# Patient Record
Sex: FEMALE | Race: WHITE | Employment: UNEMPLOYED | URBAN - METROPOLITAN AREA
[De-identification: names, ages, dates, MRNs, and addresses within clinical notes are randomized per-mention and may not be internally consistent; named-entity substitution may affect disease eponyms.]

---

## 2017-10-13 ENCOUNTER — TELEPHONE (OUTPATIENT)
Dept: FAMILY PLANNING/WOMEN'S HEALTH CLINIC | Age: 42
End: 2017-10-13

## 2017-10-13 NOTE — TELEPHONE ENCOUNTER
A letter was sent to pt but returned. Pt has been contacted twice and voice mail messages have been left but pt has not returned our calls.

## 2020-04-03 ENCOUNTER — HOSPITAL ENCOUNTER (OUTPATIENT)
Dept: CT IMAGING | Age: 45
Discharge: HOME | End: 2020-04-03
Attending: INTERNAL MEDICINE
Payer: COMMERCIAL

## 2020-04-03 DIAGNOSIS — M47.814 THORACIC SPONDYLOSIS: ICD-10-CM

## 2020-04-03 DIAGNOSIS — M47.816 LUMBAR SPONDYLOSIS: ICD-10-CM

## 2020-04-03 DIAGNOSIS — R93.3 ABNORMAL FINDINGS ON DIAGNOSTIC IMAGING OF OTHER PARTS OF DIGESTIVE TRACT: ICD-10-CM

## 2020-04-03 PROCEDURE — 74011636320 HC RX REV CODE- 636/320: Performed by: RADIOLOGY

## 2020-04-03 PROCEDURE — 74177 CT ABD & PELVIS W/CONTRAST: CPT

## 2020-04-03 PROCEDURE — 74011000258 HC RX REV CODE- 258: Performed by: RADIOLOGY

## 2020-04-03 RX ORDER — SODIUM CHLORIDE 0.9 % (FLUSH) 0.9 %
10 SYRINGE (ML) INJECTION
Status: COMPLETED | OUTPATIENT
Start: 2020-04-03 | End: 2020-04-03

## 2020-04-03 RX ADMIN — Medication 10 ML: at 11:03

## 2020-04-03 RX ADMIN — SODIUM CHLORIDE 100 ML: 900 INJECTION, SOLUTION INTRAVENOUS at 11:03

## 2020-04-03 RX ADMIN — IOHEXOL 50 ML: 240 INJECTION, SOLUTION INTRATHECAL; INTRAVASCULAR; INTRAVENOUS; ORAL at 11:03

## 2020-04-03 RX ADMIN — IOPAMIDOL 100 ML: 755 INJECTION, SOLUTION INTRAVENOUS at 11:03

## 2020-05-27 ENCOUNTER — OFFICE VISIT (OUTPATIENT)
Dept: PRIMARY CARE CLINIC | Age: 45
End: 2020-05-27

## 2020-05-27 DIAGNOSIS — Z01.818 PRE-OP EXAM: Primary | ICD-10-CM

## 2020-05-27 RX ORDER — NAPROXEN SODIUM 550 MG/1
550 TABLET ORAL AS NEEDED
COMMUNITY
End: 2020-06-24

## 2020-05-27 RX ORDER — POLYETHYLENE GLYCOL 3350 17 G/17G
17 POWDER, FOR SOLUTION ORAL DAILY
COMMUNITY
End: 2021-10-29 | Stop reason: ALTCHOICE

## 2020-05-27 RX ORDER — PANTOPRAZOLE SODIUM 20 MG/1
20 TABLET, DELAYED RELEASE ORAL DAILY
COMMUNITY
End: 2021-10-29 | Stop reason: ALTCHOICE

## 2020-05-27 NOTE — PATIENT INSTRUCTIONS
A Healthy Lifestyle: Care Instructions  Your Care Instructions    A healthy lifestyle can help you feel good, stay at a healthy weight, and have plenty of energy for both work and play. A healthy lifestyle is something you can share with your whole family. A healthy lifestyle also can lower your risk for serious health problems, such as high blood pressure, heart disease, and diabetes. You can follow a few steps listed below to improve your health and the health of your family. Follow-up care is a key part of your treatment and safety. Be sure to make and go to all appointments, and call your doctor if you are having problems. It's also a good idea to know your test results and keep a list of the medicines you take. How can you care for yourself at home? · Do not eat too much sugar, fat, or fast foods. You can still have dessert and treats now and then. The goal is moderation. · Start small to improve your eating habits. Pay attention to portion sizes, drink less juice and soda pop, and eat more fruits and vegetables. ? Eat a healthy amount of food. A 3-ounce serving of meat, for example, is about the size of a deck of cards. Fill the rest of your plate with vegetables and whole grains. ? Limit the amount of soda and sports drinks you have every day. Drink more water when you are thirsty. ? Eat at least 5 servings of fruits and vegetables every day. It may seem like a lot, but it is not hard to reach this goal. A serving or helping is 1 piece of fruit, 1 cup of vegetables, or 2 cups of leafy, raw vegetables. Have an apple or some carrot sticks as an afternoon snack instead of a candy bar. Try to have fruits and/or vegetables at every meal.  · Make exercise part of your daily routine. You may want to start with simple activities, such as walking, bicycling, or slow swimming. Try to be active 30 to 60 minutes every day. You do not need to do all 30 to 60 minutes all at once.  For example, you can exercise 3 times a day for 10 or 20 minutes. Moderate exercise is safe for most people, but it is always a good idea to talk to your doctor before starting an exercise program.  · Keep moving. Kobe Fenggs the lawn, work in the garden, or [employer]. Take the stairs instead of the elevator at work. · If you smoke, quit. People who smoke have an increased risk for heart attack, stroke, cancer, and other lung illnesses. Quitting is hard, but there are ways to boost your chance of quitting tobacco for good. ? Use nicotine gum, patches, or lozenges. ? Ask your doctor about stop-smoking programs and medicines. ? Keep trying. In addition to reducing your risk of diseases in the future, you will notice some benefits soon after you stop using tobacco. If you have shortness of breath or asthma symptoms, they will likely get better within a few weeks after you quit. · Limit how much alcohol you drink. Moderate amounts of alcohol (up to 2 drinks a day for men, 1 drink a day for women) are okay. But drinking too much can lead to liver problems, high blood pressure, and other health problems. Family health  If you have a family, there are many things you can do together to improve your health. · Eat meals together as a family as often as possible. · Eat healthy foods. This includes fruits, vegetables, lean meats and dairy, and whole grains. · Include your family in your fitness plan. Most people think of activities such as jogging or tennis as the way to fitness, but there are many ways you and your family can be more active. Anything that makes you breathe hard and gets your heart pumping is exercise. Here are some tips:  ? Walk to do errands or to take your child to school or the bus.  ? Go for a family bike ride after dinner instead of watching TV. Where can you learn more? Go to http://yamilet-haylee.info/  Enter O237 in the search box to learn more about \"A Healthy Lifestyle: Care Instructions. \"  Current as of: May 28, 2019Content Version: 12.4  © 9514-8813 [employer]. Care instructions adapted under license by [employer] (which disclaims liability or warranty for this information). If you have questions about a medical condition or this instruction, always ask your healthcare professional. [address] any warranty or liability for your use of this information.

## 2020-05-29 LAB — SARS-COV-2, NAA: NOT DETECTED

## 2020-06-01 ENCOUNTER — ANESTHESIA EVENT (OUTPATIENT)
Dept: ENDOSCOPY | Age: 45
End: 2020-06-01
Payer: COMMERCIAL

## 2020-06-01 ENCOUNTER — HOSPITAL ENCOUNTER (OUTPATIENT)
Age: 45
Setting detail: OUTPATIENT SURGERY
Discharge: HOME | End: 2020-06-01
Attending: INTERNAL MEDICINE | Admitting: INTERNAL MEDICINE
Payer: COMMERCIAL

## 2020-06-01 ENCOUNTER — ANESTHESIA (OUTPATIENT)
Dept: ENDOSCOPY | Age: 45
End: 2020-06-01
Payer: COMMERCIAL

## 2020-06-01 VITALS
TEMPERATURE: 97.7 F | OXYGEN SATURATION: 100 % | WEIGHT: 248.02 LBS | BODY MASS INDEX: 42.34 KG/M2 | SYSTOLIC BLOOD PRESSURE: 100 MMHG | RESPIRATION RATE: 20 BRPM | DIASTOLIC BLOOD PRESSURE: 72 MMHG | HEART RATE: 64 BPM | HEIGHT: 64 IN

## 2020-06-01 LAB — HCG UR QL: NEGATIVE

## 2020-06-01 PROCEDURE — 88305 TISSUE EXAM BY PATHOLOGIST: CPT

## 2020-06-01 PROCEDURE — 74011250636 HC RX REV CODE- 250/636: Performed by: INTERNAL MEDICINE

## 2020-06-01 PROCEDURE — 77030013992 HC SNR POLYP ENDOSC BSC -B: Performed by: INTERNAL MEDICINE

## 2020-06-01 PROCEDURE — 81025 URINE PREGNANCY TEST: CPT

## 2020-06-01 PROCEDURE — 74011250636 HC RX REV CODE- 250/636: Performed by: NURSE ANESTHETIST, CERTIFIED REGISTERED

## 2020-06-01 PROCEDURE — 76060000031 HC ANESTHESIA FIRST 0.5 HR: Performed by: INTERNAL MEDICINE

## 2020-06-01 PROCEDURE — 76040000019: Performed by: INTERNAL MEDICINE

## 2020-06-01 RX ORDER — SODIUM CHLORIDE 9 MG/ML
INJECTION, SOLUTION INTRAVENOUS
Status: DISCONTINUED | OUTPATIENT
Start: 2020-06-01 | End: 2020-06-01 | Stop reason: HOSPADM

## 2020-06-01 RX ORDER — FLUMAZENIL 0.1 MG/ML
0.2 INJECTION INTRAVENOUS
Status: DISCONTINUED | OUTPATIENT
Start: 2020-06-01 | End: 2020-06-01 | Stop reason: HOSPADM

## 2020-06-01 RX ORDER — FENTANYL CITRATE 50 UG/ML
100 INJECTION, SOLUTION INTRAMUSCULAR; INTRAVENOUS
Status: DISCONTINUED | OUTPATIENT
Start: 2020-06-01 | End: 2020-06-01 | Stop reason: HOSPADM

## 2020-06-01 RX ORDER — ATROPINE SULFATE 0.1 MG/ML
0.5 INJECTION INTRAVENOUS
Status: DISCONTINUED | OUTPATIENT
Start: 2020-06-01 | End: 2020-06-01 | Stop reason: HOSPADM

## 2020-06-01 RX ORDER — DEXTROMETHORPHAN/PSEUDOEPHED 2.5-7.5/.8
1.2 DROPS ORAL
Status: DISCONTINUED | OUTPATIENT
Start: 2020-06-01 | End: 2020-06-01 | Stop reason: HOSPADM

## 2020-06-01 RX ORDER — EPINEPHRINE 0.1 MG/ML
1 INJECTION INTRACARDIAC; INTRAVENOUS
Status: DISCONTINUED | OUTPATIENT
Start: 2020-06-01 | End: 2020-06-01 | Stop reason: HOSPADM

## 2020-06-01 RX ORDER — PROPOFOL 10 MG/ML
INJECTION, EMULSION INTRAVENOUS AS NEEDED
Status: DISCONTINUED | OUTPATIENT
Start: 2020-06-01 | End: 2020-06-01 | Stop reason: HOSPADM

## 2020-06-01 RX ORDER — SODIUM CHLORIDE 9 MG/ML
50 INJECTION, SOLUTION INTRAVENOUS CONTINUOUS
Status: DISCONTINUED | OUTPATIENT
Start: 2020-06-01 | End: 2020-06-01 | Stop reason: HOSPADM

## 2020-06-01 RX ORDER — NALOXONE HYDROCHLORIDE 0.4 MG/ML
0.4 INJECTION, SOLUTION INTRAMUSCULAR; INTRAVENOUS; SUBCUTANEOUS
Status: DISCONTINUED | OUTPATIENT
Start: 2020-06-01 | End: 2020-06-01 | Stop reason: HOSPADM

## 2020-06-01 RX ORDER — MIDAZOLAM HYDROCHLORIDE 1 MG/ML
.25-5 INJECTION, SOLUTION INTRAMUSCULAR; INTRAVENOUS
Status: DISCONTINUED | OUTPATIENT
Start: 2020-06-01 | End: 2020-06-01 | Stop reason: HOSPADM

## 2020-06-01 RX ADMIN — PROPOFOL 50 MG: 10 INJECTION, EMULSION INTRAVENOUS at 09:07

## 2020-06-01 RX ADMIN — SODIUM CHLORIDE: 900 INJECTION, SOLUTION INTRAVENOUS at 08:50

## 2020-06-01 RX ADMIN — PROPOFOL 100 MG: 10 INJECTION, EMULSION INTRAVENOUS at 09:00

## 2020-06-01 RX ADMIN — SODIUM CHLORIDE 50 ML/HR: 900 INJECTION, SOLUTION INTRAVENOUS at 07:53

## 2020-06-01 RX ADMIN — PROPOFOL 30 MG: 10 INJECTION, EMULSION INTRAVENOUS at 09:13

## 2020-06-01 RX ADMIN — PROPOFOL 30 MG: 10 INJECTION, EMULSION INTRAVENOUS at 09:16

## 2020-06-01 RX ADMIN — PROPOFOL 30 MG: 10 INJECTION, EMULSION INTRAVENOUS at 09:09

## 2020-06-01 RX ADMIN — PROPOFOL 30 MG: 10 INJECTION, EMULSION INTRAVENOUS at 09:19

## 2020-06-01 RX ADMIN — PROPOFOL 50 MG: 10 INJECTION, EMULSION INTRAVENOUS at 09:03

## 2020-06-01 NOTE — ANESTHESIA POSTPROCEDURE EVALUATION
Procedure(s):  COLONOSCOPY  ESOPHAGOGASTRODUODENOSCOPY (EGD)  ENDOSCOPIC POLYPECTOMY. MAC    Anesthesia Post Evaluation        Patient location during evaluation: bedside  Level of consciousness: awake  Pain management: satisfactory to patient  Airway patency: patent  Anesthetic complications: no  Cardiovascular status: acceptable  Respiratory status: acceptable  Hydration status: acceptable        INITIAL Post-op Vital signs:   Vitals Value Taken Time   /72 6/1/2020  9:46 AM   Temp 36.5 °C (97.7 °F) 6/1/2020  9:34 AM   Pulse 64 6/1/2020  9:50 AM   Resp 16 6/1/2020  9:50 AM   SpO2 100 % 6/1/2020  9:50 AM   Vitals shown include unvalidated device data.

## 2020-06-01 NOTE — PERIOP NOTES
Received Report From Geno Brown CRNA @ 6057, see anesthesia notes. Care of the patient transferred to procedure nurse Mei Marroquin RN @ 1685    Out of Procedure and sent to post-recovery @ 6718    Post-recovery report given to [facility] @ 6683    Patient ABD remains soft and non-tender post procedure. Pt has no complaints at this time and tolerated the procedure well. Endoscope was pre-cleaned at bedside immediately following procedure by Zac Taylor.

## 2020-06-01 NOTE — H&P
Patient Name: Jun Woods   Account #: [id number]    Gender: Female    (age): 1975 (38)       Provider:     Julio C Goodman MD         Chief Complaint: ABNormal CT           History of Present Illness:           Personal history years of pain, worsened by motion torso, physical activity. Spine XR indicate extensive spondylosis. 2018 had severe pains with ER visit where prominent retroperitoneal LN seen. Recent pain flare returned to ER where CT showed LN enlargement. CBC, CMP were unremarkable. No fever, vomiting, visible blood loss. ? Referral to interventional radiology did not feel lymph node biopsies to be either feasible or indicated. Past Medical History      Medical Conditions: Arthritis  Hemorrhoids   Surgical Procedures: Tubal Ligation  Warren teeth removal  Tonsillectomy     Dx Studies: CT Scan   Medications: ibuprofen 200 mg Take 1 capsule by mouth once a day  ondansetron HCl 24 mg Take 1 tablet by mouth as directed   Allergies: Sulfa (Sulfonamide Antibiotics)   Immunizations: Influenza, seasonal, injectable (refused)      Social History      Alcohol: None   Tobacco: Former smoker   Drugs: Former. Exercise: None   Caffeine: Daily. Family History       Review of Systems:   Cardiovascular: Presents suffers from Dez Schein. Denies chest pain, irregular heart beat, palpitations, peripheral edema, syncope. Constitutional: Denies fatigue, fever, loss of appetite, weight gain, weight loss. ENMT: Denies nose bleeds, sore throat, hearing loss. Endocrine: Denies excessive thirst, heat intolerance. Eyes: Denies loss of vision. Gastrointestinal: Presents suffers from abdominal pain, change in bowel habits, constipation, diarrhea, Bloating/gas, nausea. Denies abdominal swelling, heartburn, jaundice, rectal bleeding, stomach cramps, vomiting, dysphagia, rectal pain, Stool incontinence, hematemesis. Genitourinary: Presents suffers from dark urine. Denies dysuria, frequent urination, hematuria, incontinence. Hematologic/Lymphatic: Denies easy bruising, prolonged bleeding. Integumentary: Denies itching, rashes, sun sensitivity. Musculoskeletal: Presents suffers from arthritis, back pain. Denies gout, joint pain, muscle weakness, stiffness. Neurological: Presents suffers from dizziness, frequent headaches. Denies fainting, memory loss. Psychiatric: Presents suffers from difficulty sleeping. Denies anxiety, depression, hallucinations, nervousness, panic attacks, paranoia. Respiratory: Denies cough, dyspnea, wheezing. Vital Signs:   See nursing notes     Physical Exam:   Constitutional:      Appearance: No distress, appears comfortable. Skin:      Inspection: No rash, no jaundice. Head/face: Inspection: Normacephalic, atraumatic. Eyes:      Conjunctivae/lids: Normal.   ENMT:      External: Normal.   Neck:      Neck: Normal appearance, trachea midline. Respiratory:      Effort: Normal respiratory effort, comfortable, speaks in complete sentences. Auscultation: normal breath sounds, no rubs, wheezes or rhonchi. Cardiovascular: Auscultation: normal, S1 and S2, no gallops , no rubs or murmurs . Gastrointestinal/Abdomen:      Abdomen: non-distended, nontender. Liver/Spleen: normal, normal size, Liver size and consistency normal, spleen is non-palpable. Musculoskeletal:      Gait/station: normal.   Back: diffusely tender spine and ribs. Muscles: normal strength and tone, no atrophy or abnormal movements. Psychiatric:      Judgment/insight: Normal, normal judgement, normal insight. Mood and affect: Normal mood, affect full, no evidence of depression, anxiety or agitation. Lymphatic:      Neck: No lymphadenopathy in the cervical or supraclavicular chain. Impressions: Abnormal findings on diagnostic imaging of other parts of digestive tract  Lumbar spondylosis  Thoracic spondylosis?  ?     Plan: I've discussed EGD, colonoscopy possible biopsy, polypectomy, cautery, injection, alternatives, complications including but not limited to pain, cardiopulmonary event, bleeding, perforation requiring additional blood transfusion or operative repair; all questions answered.

## 2020-06-01 NOTE — DISCHARGE INSTRUCTIONS
Ssuie Tomlin  [id number]  1975    DISCHARGE INSTRUCTIONS  Discomfort:  Redness at IV site- apply warm compress to area; if redness or soreness persist- contact your physician. There may be a slight amount of blood passed from the rectum. Gaseous discomfort - walking, belching will help relieve any discomfort. You may not operate a vehicle for 12 hours. You may not engage in an occupation involving machinery or appliances for rest of today. You may not drink alcoholic beverages for at least 12 hours. Avoid making any critical decisions for at least 24 hours. DIET:   High fiber diet. - however -  remember your colon is empty and a heavy meal will produce gas. Avoid these foods:  vegetables, fried / greasy foods, carbonated drinks for today. ACTIVITY:  You may resume your normal daily activities it is recommended that you spend the remainder of the day resting -  avoid any strenuous activity. CALL M.D.   ANY SIGN OF:   Increasing pain, nausea, vomiting  Abdominal distension (swelling)  New increased bleeding (oral or rectal)  Fever (chills)  Pain in chest area  Bloody discharge from nose or mouth  Shortness of breath     Follow-up Instructions:  Call Dr. Leann Chatterjee in five years for follow up colon exam  See Dr Kelly Garza 9-12 months for follow up abdominal CT scan  Continue current medications if you feel better using them; if no improvement discontinue them      DISCHARGE SUMMARY from Nurse    The following personal items collected during your admission are returned to you:   Dental Appliance: Dental Appliances: None  Vision: Visual Aid: None  Hearing Aid:    Jewelry:    Clothing:    Other Valuables:    Valuables sent to safe:

## 2020-06-01 NOTE — ROUTINE PROCESS
Nathalie Jarvis  1975  [id number]    Situation:  Verbal report received from: Nicole  Procedure: Procedure(s):  COLONOSCOPY  ESOPHAGOGASTRODUODENOSCOPY (EGD)  ENDOSCOPIC POLYPECTOMY    Background:    Preoperative diagnosis: PERSISTANT VOMITTING  GENERALIZED ABDOMINAL PAIN  Postoperative diagnosis: sigmoid polyp    :  Dr. Hamilton Martin  Assistant(s): Endoscopy Technician-1: Celso Diaz  Endoscopy RN-1: Jolynn Salvador RN    Specimens:   ID Type Source Tests Collected by Time Destination   1 : sigmoid polyp Preservative Sigmoid  Cathy Mcneal MD 6/1/2020 0919 Pathology     H. Pylori  no    Assessment:  Intra-procedure medications     Anesthesia gave intra-procedure sedation and medications, see anesthesia flow sheet yes    Intravenous fluids: NS@ KVO     Vital signs stable     Abdominal assessment: round and soft     Recommendation:  Discharge patient per MD order.   Family or Friend   Permission to share finding with family or friend yes

## 2020-06-01 NOTE — ANESTHESIA PREPROCEDURE EVALUATION
Relevant Problems   No relevant active problems       Anesthetic History   No history of anesthetic complications            Review of Systems / Medical History  Patient summary reviewed, nursing notes reviewed and pertinent labs reviewed    Pulmonary  Within defined limits                 Neuro/Psych   Within defined limits      Headaches     Cardiovascular  Within defined limits                     GI/Hepatic/Renal  Within defined limits              Endo/Other  Within defined limits           Other Findings              Physical Exam    Airway  Mallampati: II  TM Distance: 4 - 6 cm  Neck ROM: normal range of motion   Mouth opening: Normal     Cardiovascular    Rhythm: regular  Rate: normal         Dental  No notable dental hx       Pulmonary  Breath sounds clear to auscultation               Abdominal         Other Findings            Anesthetic Plan    ASA: 2  Anesthesia type: MAC            Anesthetic plan and risks discussed with: Patient

## 2020-06-01 NOTE — PERIOP NOTES
Patient resting comfortably on stretcher, HOB elevated, VS stable, call bell within reach, waiting for procedure start, will continue to monitor pt.

## 2020-06-01 NOTE — PROCEDURES
Rafael Carlson M.D. 2020    Esophagogastroduodenoscopy (EGD) Colonoscopy Procedure Note  Naz Rousseau  : 1975  [facility] Medical Record Number: [id number]      Indications:    abnormal CT, generalized abdominal pains  Referring Physician:  None  Anesthesia/Sedation: see nursing notes  Endoscopist:  Dr. Yaquelin Melendez  Assistants: None  Permit:  The indications, risks, benefits and alternatives were reviewed with the patient or their decision maker who was provided an opportunity to ask questions and all questions were answered. The specific risks of esophagogastroduodenoscopy with conscious sedation were reviewed, including but not limited to anesthetic complication, bleeding, adverse drug reaction, missed lesion, infection, IV site reactions, and intestinal perforation which would lead to the need for surgical repair. Alternatives to EGD including radiographic imaging, observation without testing, or laboratory testing were reviewed as well as the limitations of those alternatives discussed. After considering the options and having all their questions answered, the patient or their decision maker provided both verbal and written consent to proceed. Procedure in Detail:  After obtaining informed consent, positioning of the patient in the left lateral decubitus position, and conduction of a pre-procedure pause or \"time out\" the endoscope was introduced into the mouth and advanced to the duodenum. A careful inspection was made, and findings or interventions are described below.     Findings:   Esophagus:normal  Stomach: normal   Duodenum/jejunum: normal    Complications/estimated blood loss: none    Therapies:  none    Specimens: none    Implants:none           Endoscopist:  Dr. Yaquelin Melendez  Assistants: None  Complications:  None  Estimate Blood Loss:  None    Colonoscopy is to follow    Permit:  The indications, risks, benefits and alternatives were reviewed with the patient or their decision maker who was provided an opportunity to ask questions and all questions were answered. The specific risks of colonoscopy with conscious sedation were reviewed, including but not limited to anesthetic complication, bleeding, adverse drug reaction, missed lesion, infection, IV site reactions, and intestinal perforation which would lead to the need for surgical repair. Alternatives to colonoscopy including radiographic imaging, observation without testing, or laboratory testing were reviewed including the limitations of those alternatives. After considering the options and having all their questions answered, the patient or their decision maker provided both verbal and written consent to proceed. Procedure in Detail:  After obtaining informed consent, positioning of the patient in the left lateral decubitus position, and conduction of a pre-procedure pause or \"time out\" the endoscope was introduced into the anus and advanced to the terminal ileum. The quality of the colonic preparation was satisfactory. A careful inspection was made as the colonoscope was withdrawn, findings and interventions are described below. Appendiceal orifice photographed    Findings:   Single sessile sigmoid polyp; exam otherwise unremarkable    Specimens:    colon polyp removed cold snare    Implants: none    Complications:   None; patient tolerated the procedure well. Estimated blood loss: none    Impression:  incidental finding colon polyp    Recommendations:     - Repeat colonoscopy in 5 years. - trial of clearlax 17 gm/ 12 ounces daily    Thank you for entrusting me with this patient's care. Please do not hesitate to contact me with any questions or if I can be of assistance with any of your other patients' GI needs.     Signed By: Aj Powell MD                        June 1, 2020

## 2020-06-02 ENCOUNTER — VIRTUAL VISIT (OUTPATIENT)
Dept: FAMILY MEDICINE CLINIC | Age: 45
End: 2020-06-02

## 2020-06-02 VITALS — BODY MASS INDEX: 42.34 KG/M2 | RESPIRATION RATE: 16 BRPM | HEIGHT: 64 IN | WEIGHT: 248 LBS

## 2020-06-02 DIAGNOSIS — K59.00 CONSTIPATION, UNSPECIFIED CONSTIPATION TYPE: ICD-10-CM

## 2020-06-02 DIAGNOSIS — R10.11 RIGHT UPPER QUADRANT ABDOMINAL PAIN: Primary | ICD-10-CM

## 2020-06-02 DIAGNOSIS — R59.0 LYMPHADENOPATHY, ABDOMINAL: ICD-10-CM

## 2020-06-02 NOTE — PROGRESS NOTES
Vaughn Baker is a 38 y.o. female who was seen by synchronous (real-time) audio-video technology on 6/2/2020. Assessment & Plan:   Diagnoses and all orders for this visit:    1. Right upper quadrant abdominal pain  -     CT ABD PELV W WO CONT; Future  -     METABOLIC PANEL, COMPREHENSIVE; Future  -     LIPASE; Future  -     CBC WITH AUTOMATED DIFF; Future    2. Lymphadenopathy, abdominal  -     CT ABD PELV W WO CONT; Future  -     METABOLIC PANEL, COMPREHENSIVE; Future  -     CBC WITH AUTOMATED DIFF; Future    3. Constipation, unspecified constipation type  -     TSH 3RD GENERATION; Future        Uncertain etiology of her abdominal pain  Needs follow up on lymphadenopathy  Constipation may be related to her abdominal pain  CT early July  Labs per orders. She has been off of her Miralax temporarily and will restart it today    Follow-up and Dispositions    · Return in about 2 weeks (around 6/16/2020) for abdominal pain - in person visit. Reviewed plan of care. Patient has provided input and agrees with goals. CPT Codes 63063-45407 for Established Patients may apply to this Telehealth Visit      Subjective:   Vaughn Baker was seen for Establish Care and Abdominal Pain      Patient presents with:  Establish Care  Abdominal Pain    The pain has been present for over a year and is getting worse. The pain is in her RUQ radiating towards her umbilicus. It is sharp and then feels like an electric shock. The pain is daily, lasting around 10 - 20 minutes. Nothing in particular makes it worse, Naprosyn sometimes helps a little. She has seen Gastroenterology about her pain and, except for a colon polyp, had a normal EGD and colonoscopy this month. Her last abdominal/peilvic CT was in March showing intrabdominal adenopathy. Radiology recommended that she either have a PET scan or a follow up CT in 3 months.         Review of Systems   Constitutional: Positive for malaise/fatigue and weight loss. Negative for chills and fever. Respiratory: Negative for shortness of breath. Cardiovascular: Negative for chest pain. Gastrointestinal: Positive for abdominal pain, constipation and heartburn. Negative for blood in stool, diarrhea, melena, nausea and vomiting. GERD symptoms 2-3 times a week         Objective:     Visit Vitals  Resp 16   Ht 5' 4\" (1.626 m)   Wt 248 lb (112.5 kg)   LMP 05/08/2020   BMI 42.57 kg/m²       Physical Exam  Constitutional:       General: She is not in acute distress. Appearance: Normal appearance. She is not diaphoretic. Neurological:      Mental Status: She is alert and oriented to person, place, and time. Due to this being a TeleHealth evaluation, many elements of the physical examination are unable to be assessed. We discussed the expected course, resolution and complications of the diagnosis(es) in detail. Medication risks, benefits, costs, interactions, and alternatives were discussed as indicated. I advised her to contact the office if her condition worsens, changes or fails to improve as anticipated. She expressed understanding with the diagnosis(es) and plan. Pursuant to the emergency declaration under the [facility], [facility] waiver authority and the [employer] General Act, this Virtual  Visit was conducted, with patient's consent, to reduce the patient's risk of exposure to COVID-19 and provide continuity of care for an established patient. Services were provided through a video synchronous discussion virtually to substitute for in-person clinic visit.     Jeffrey Echavarria MD

## 2020-06-02 NOTE — PROGRESS NOTES
Chief Complaint   Patient presents with   24 [facility] Establish Care    Abdominal Pain     1. Have you been to the ER, urgent care clinic since your last visit? Hospitalized since your last visit? No    2. Have you seen or consulted any other health care providers outside of the [address] [place] [address] since your last visit? Include any pap smears or colon screening. Yes, 06/01/2020 01 [facility], Endoscopy.     Patient is completing visit from [place], [place].

## 2020-06-03 ENCOUNTER — HOSPITAL ENCOUNTER (OUTPATIENT)
Dept: LAB | Age: 45
Discharge: HOME | End: 2020-06-03

## 2020-06-03 DIAGNOSIS — K59.00 CONSTIPATION, UNSPECIFIED CONSTIPATION TYPE: ICD-10-CM

## 2020-06-03 DIAGNOSIS — R59.0 LYMPHADENOPATHY, ABDOMINAL: ICD-10-CM

## 2020-06-03 DIAGNOSIS — R10.11 RIGHT UPPER QUADRANT ABDOMINAL PAIN: ICD-10-CM

## 2020-06-03 LAB
ALBUMIN SERPL-MCNC: 3.7 G/DL (ref 3.5–5)
ALBUMIN/GLOB SERPL: 0.9 {RATIO} (ref 1.1–2.2)
ALP SERPL-CCNC: 140 U/L (ref 45–117)
ALT SERPL-CCNC: 26 U/L (ref 12–78)
ANION GAP SERPL CALC-SCNC: 8 MMOL/L (ref 5–15)
AST SERPL-CCNC: 31 U/L (ref 15–37)
BASOPHILS # BLD: 0 K/UL (ref 0–0.1)
BASOPHILS NFR BLD: 0 % (ref 0–1)
BILIRUB SERPL-MCNC: 0.3 MG/DL (ref 0.2–1)
BUN SERPL-MCNC: 10 MG/DL (ref 6–20)
BUN/CREAT SERPL: 12 (ref 12–20)
CALCIUM SERPL-MCNC: 9.8 MG/DL (ref 8.5–10.1)
CHLORIDE SERPL-SCNC: 106 MMOL/L (ref 97–108)
CO2 SERPL-SCNC: 24 MMOL/L (ref 21–32)
CREAT SERPL-MCNC: 0.85 MG/DL (ref 0.55–1.02)
DIFFERENTIAL METHOD BLD: ABNORMAL
EOSINOPHIL # BLD: 0.1 K/UL (ref 0–0.4)
EOSINOPHIL NFR BLD: 2 % (ref 0–7)
ERYTHROCYTE [DISTWIDTH] IN BLOOD BY AUTOMATED COUNT: 15 % (ref 11.5–14.5)
GLOBULIN SER CALC-MCNC: 4.1 G/DL (ref 2–4)
GLUCOSE SERPL-MCNC: 91 MG/DL (ref 65–100)
HCT VFR BLD AUTO: 36.1 % (ref 35–47)
HGB BLD-MCNC: 11.1 G/DL (ref 11.5–16)
IMM GRANULOCYTES # BLD AUTO: 0 K/UL (ref 0–0.04)
IMM GRANULOCYTES NFR BLD AUTO: 0 % (ref 0–0.5)
LIPASE SERPL-CCNC: 93 U/L (ref 73–393)
LYMPHOCYTES # BLD: 1.1 K/UL (ref 0.8–3.5)
LYMPHOCYTES NFR BLD: 21 % (ref 12–49)
MCH RBC QN AUTO: 26.1 PG (ref 26–34)
MCHC RBC AUTO-ENTMCNC: 30.7 G/DL (ref 30–36.5)
MCV RBC AUTO: 84.9 FL (ref 80–99)
MONOCYTES # BLD: 0.3 K/UL (ref 0–1)
MONOCYTES NFR BLD: 6 % (ref 5–13)
NEUTS SEG # BLD: 3.8 K/UL (ref 1.8–8)
NEUTS SEG NFR BLD: 71 % (ref 32–75)
NRBC # BLD: 0 K/UL (ref 0–0.01)
NRBC BLD-RTO: 0 PER 100 WBC
PLATELET # BLD AUTO: 267 K/UL (ref 150–400)
PMV BLD AUTO: 10.5 FL (ref 8.9–12.9)
POTASSIUM SERPL-SCNC: 4.2 MMOL/L (ref 3.5–5.1)
PROT SERPL-MCNC: 7.8 G/DL (ref 6.4–8.2)
RBC # BLD AUTO: 4.25 M/UL (ref 3.8–5.2)
SODIUM SERPL-SCNC: 138 MMOL/L (ref 136–145)
TSH SERPL DL<=0.05 MIU/L-ACNC: 3.15 UIU/ML (ref 0.36–3.74)
WBC # BLD AUTO: 5.3 K/UL (ref 3.6–11)

## 2020-06-10 DIAGNOSIS — R74.8 ELEVATED ALKALINE PHOSPHATASE LEVEL: Primary | ICD-10-CM

## 2020-06-11 ENCOUNTER — HOSPITAL ENCOUNTER (OUTPATIENT)
Dept: LAB | Age: 45
Discharge: HOME | End: 2020-06-11

## 2020-06-11 DIAGNOSIS — R74.8 ELEVATED ALKALINE PHOSPHATASE LEVEL: ICD-10-CM

## 2020-06-11 LAB — GGT SERPL-CCNC: 48 U/L (ref 5–55)

## 2020-06-13 LAB
ALP BONE CFR SERPL: 21 % (ref 14–68)
ALP INTEST CFR SERPL: 0 % (ref 0–18)
ALP LIVER CFR SERPL: 79 % (ref 18–85)
ALP SERPL-CCNC: 142 IU/L (ref 39–117)

## 2020-06-23 PROBLEM — D12.6 TUBULAR ADENOMA OF COLON: Status: ACTIVE | Noted: 2020-06-23

## 2020-06-23 NOTE — PROGRESS NOTES
HISTORY OF PRESENT ILLNESS  Leon Rosa is a 38 y.o. female. Leon Rosa is here to follow up on her RUQ abdominal pain. It is unchanged. and has been present for over a year. The pain is in her RUQ radiating towards her umbilicus and RLQ. It is sharp and then feels like an electric shock. The pain is daily, lasting around 10 - 20 minutes. Nothing in particular makes it worse, Naprosyn sometimes helps a little. Her labs were significant for a mildly decreased hemoglobin, elevated globulin, and an elevated alkaline phos. Isoenzymes were normal.  She had a colonoscopy and EGD done the first of the month and a tubular adenoma was found. Her EGD was normal.   Also, she has intrabdominal adenopathy and has a follow up CT and biopsy scheduled. This is scheduled for next week. Review of Systems   Constitutional: Positive for weight loss. Negative for chills and fever. Gastrointestinal: Positive for abdominal pain. Negative for blood in stool, constipation, diarrhea, heartburn, melena, nausea and vomiting. Neurological: Negative for dizziness. Visit Vitals  BP (!) 128/95   Pulse 83   Temp 97.8 °F (36.6 °C) (Temporal)   Resp 20   Ht 5' 4\" (1.626 m)   Wt 248 lb (112.5 kg)   SpO2 99%   BMI 42.57 kg/m²     BP Readings from Last 3 Encounters:   06/24/20 (!) 128/95   06/01/20 100/72   12/13/16 (!) 144/92       Physical Exam  Vitals signs and nursing note reviewed. Constitutional:       General: She is not in acute distress. Appearance: Normal appearance. She is well-developed. She is not diaphoretic. Cardiovascular:      Rate and Rhythm: Normal rate and regular rhythm. Heart sounds: Normal heart sounds. No murmur. No friction rub. No gallop. Pulmonary:      Effort: Pulmonary effort is normal. No respiratory distress. Breath sounds: Normal breath sounds. No wheezing or rales. Abdominal:      General: Bowel sounds are normal. There is no distension.       Palpations: Abdomen is soft.      Tenderness: There is abdominal tenderness in the right lower quadrant. There is no guarding or rebound. Skin:     General: Skin is warm and dry. Neurological:      Mental Status: She is alert and oriented to person, place, and time. ASSESSMENT and PLAN    ICD-10-CM ICD-9-CM    1. Right upper quadrant abdominal pain R10.11 789.01    2. Elevated blood protein E88.09 273.8 FREE LIGHT CHAINS, KAPPA/LAMBDA, QT      PROTEIN ELECTROPHORESIS      PROTEIN ELECTROPHORESIS + JOHAN, UR, 24HR   3. Lymphadenopathy, abdominal R59.0 785.6 FREE LIGHT CHAINS, KAPPA/LAMBDA, QT      PROTEIN ELECTROPHORESIS      PROTEIN ELECTROPHORESIS + JOHAN, UR, 24HR   4. Constipation, unspecified constipation type K59.00 564.00    5. Obesity, morbid ([address] E66.01 278.01    6. Elevated blood pressure reading R03.0 796.2         Pain unchanged, consider myofascial pain if CT does not reveal anything else that could be causing this  Constipation resolved  Labs per orders. Continue current plans. Follow-up and Dispositions    · Return in about 6 weeks (around 8/5/2020) for blood pressure. Reviewed plan of care. Patient has provided input and agrees with goals.

## 2020-06-24 ENCOUNTER — OFFICE VISIT (OUTPATIENT)
Dept: FAMILY MEDICINE CLINIC | Age: 45
End: 2020-06-24

## 2020-06-24 ENCOUNTER — HOSPITAL ENCOUNTER (OUTPATIENT)
Dept: LAB | Age: 45
Discharge: HOME | End: 2020-06-24

## 2020-06-24 VITALS
HEIGHT: 64 IN | HEART RATE: 83 BPM | RESPIRATION RATE: 20 BRPM | BODY MASS INDEX: 42.34 KG/M2 | TEMPERATURE: 97.8 F | OXYGEN SATURATION: 99 % | WEIGHT: 248 LBS | SYSTOLIC BLOOD PRESSURE: 128 MMHG | DIASTOLIC BLOOD PRESSURE: 95 MMHG

## 2020-06-24 DIAGNOSIS — R59.0 LYMPHADENOPATHY, ABDOMINAL: ICD-10-CM

## 2020-06-24 DIAGNOSIS — R77.9 ELEVATED BLOOD PROTEIN: ICD-10-CM

## 2020-06-24 DIAGNOSIS — E66.01 OBESITY, MORBID (HCC): ICD-10-CM

## 2020-06-24 DIAGNOSIS — K59.00 CONSTIPATION, UNSPECIFIED CONSTIPATION TYPE: ICD-10-CM

## 2020-06-24 DIAGNOSIS — R03.0 ELEVATED BLOOD PRESSURE READING: ICD-10-CM

## 2020-06-24 DIAGNOSIS — R10.11 RIGHT UPPER QUADRANT ABDOMINAL PAIN: Primary | ICD-10-CM

## 2020-06-24 NOTE — PROGRESS NOTES
Chief Complaint   Patient presents with    Results     follow up on lab work     Side Pain     right side- pt has paperwork of her history

## 2020-06-25 LAB
ALBUMIN SERPL ELPH-MCNC: 3.8 G/DL (ref 2.9–4.4)
ALBUMIN/GLOB SERPL: 1 {RATIO} (ref 0.7–1.7)
ALPHA1 GLOB SERPL ELPH-MCNC: 0.2 G/DL (ref 0–0.4)
ALPHA2 GLOB SERPL ELPH-MCNC: 0.7 G/DL (ref 0.4–1)
B-GLOBULIN SERPL ELPH-MCNC: 1.4 G/DL (ref 0.7–1.3)
GAMMA GLOB SERPL ELPH-MCNC: 1.4 G/DL (ref 0.4–1.8)
GLOBULIN SER CALC-MCNC: 3.8 G/DL (ref 2.2–3.9)
KAPPA LC FREE SER-MCNC: 20.6 MG/L (ref 3.3–19.4)
KAPPA LC FREE/LAMBDA FREE SER: 1.43 {RATIO} (ref 0.26–1.65)
LAMBDA LC FREE SERPL-MCNC: 14.4 MG/L (ref 5.7–26.3)
M PROTEIN SERPL ELPH-MCNC: ABNORMAL G/DL
PROT SERPL-MCNC: 7.6 G/DL (ref 6–8.5)

## 2020-06-26 ENCOUNTER — HOSPITAL ENCOUNTER (OUTPATIENT)
Dept: LAB | Age: 45
Discharge: HOME | End: 2020-06-26

## 2020-06-26 DIAGNOSIS — R77.9 ELEVATED BLOOD PROTEIN: ICD-10-CM

## 2020-06-29 LAB
ALBUMIN 24H MFR UR ELPH: 100 %
ALPHA1 GLOB 24H MFR UR ELPH: 0 %
ALPHA2 GLOB 24H MFR UR ELPH: 0 %
B-GLOBULIN MFR UR ELPH: 0 %
COLLECT DURATION TIME UR: 24 HR
GAMMA GLOB 24H MFR UR ELPH: 0 %
INTERPRETATION UR IFE-IMP: NORMAL
M PROTEIN 24H MFR UR ELPH: NORMAL %
NOTE, 149533: NORMAL
PROT 24H UR-MRATE: <108 MG/24 HR (ref 30–150)
PROT UR-MCNC: <4 MG/DL
SPECIMEN VOL ?TM UR: 2700 ML

## 2020-07-09 ENCOUNTER — HOSPITAL ENCOUNTER (OUTPATIENT)
Dept: CT IMAGING | Age: 45
Discharge: HOME | End: 2020-07-09
Attending: FAMILY MEDICINE
Payer: COMMERCIAL

## 2020-07-09 DIAGNOSIS — R59.0 LYMPHADENOPATHY, ABDOMINAL: ICD-10-CM

## 2020-07-09 DIAGNOSIS — R10.11 RIGHT UPPER QUADRANT ABDOMINAL PAIN: ICD-10-CM

## 2020-07-09 PROCEDURE — 74011636320 HC RX REV CODE- 636/320: Performed by: RADIOLOGY

## 2020-07-09 PROCEDURE — 74177 CT ABD & PELVIS W/CONTRAST: CPT

## 2020-07-09 RX ADMIN — IOPAMIDOL 100 ML: 755 INJECTION, SOLUTION INTRAVENOUS at 08:38

## 2020-07-19 ENCOUNTER — TELEPHONE (OUTPATIENT)
Dept: FAMILY MEDICINE CLINIC | Age: 45
End: 2020-07-19

## 2020-07-29 ENCOUNTER — OFFICE VISIT (OUTPATIENT)
Dept: FAMILY MEDICINE CLINIC | Age: 45
End: 2020-07-29

## 2020-07-29 VITALS
RESPIRATION RATE: 20 BRPM | DIASTOLIC BLOOD PRESSURE: 68 MMHG | SYSTOLIC BLOOD PRESSURE: 119 MMHG | HEIGHT: 64 IN | BODY MASS INDEX: 42.51 KG/M2 | TEMPERATURE: 98.2 F | OXYGEN SATURATION: 99 % | HEART RATE: 77 BPM | WEIGHT: 249 LBS

## 2020-07-29 DIAGNOSIS — R10.11 RIGHT UPPER QUADRANT ABDOMINAL PAIN: ICD-10-CM

## 2020-07-29 DIAGNOSIS — M19.91 PRIMARY OSTEOARTHRITIS, UNSPECIFIED SITE: ICD-10-CM

## 2020-07-29 DIAGNOSIS — R59.0 LYMPHADENOPATHY, ABDOMINAL: ICD-10-CM

## 2020-07-29 DIAGNOSIS — N20.0 RENAL STONES: ICD-10-CM

## 2020-07-29 DIAGNOSIS — R03.0 ELEVATED BP WITHOUT DIAGNOSIS OF HYPERTENSION: Primary | ICD-10-CM

## 2020-07-29 NOTE — PROGRESS NOTES
HISTORY OF PRESENT ILLNESS  Meagan Salazar is a 38 y.o. female. Meagan Salazar is here to follow up on their elevated BP. This is a chronic problem. The problem occurs constantly and is improved. The symptoms are relieved by nothing. She also needs to follow up on her RUQ pain that has been present for over a year. Abdominal pelvic CT showed:    1. Stable mildly enlarged upper abdominal and retroperitoneal lymph nodes, which  remain indeterminant. No new or enlarging lymphadenopathy in the abdomen or  pelvis. 2. No evidence of acute process in the abdomen or pelvis. 3. Small nonobstructing left nephrolithiasis. Labs were unremarkable. She thinks she may have passed several stones. She wants to see Rheumatology for her osteoarthritis. Review of Systems   Constitutional: Negative for weight loss. No weight gain   Eyes: Negative for blurred vision. Respiratory: Negative for shortness of breath. Cardiovascular: Negative for chest pain and leg swelling. Gastrointestinal: Positive for abdominal pain. Genitourinary: Positive for flank pain. Negative for hematuria. Neurological: Positive for headaches. Negative for dizziness, sensory change, speech change and focal weakness. Occasional mild headaches       Visit Vitals  /68   Pulse 77   Temp 98.2 °F (36.8 °C) (Temporal)   Resp 20   Ht 5' 4\" (1.626 m)   Wt 249 lb (112.9 kg)   SpO2 99%   BMI 42.74 kg/m²     BP Readings from Last 3 Encounters:   06/24/20 (!) 128/95   06/01/20 100/72   12/13/16 (!) 144/92       Physical Exam  Vitals signs and nursing note reviewed. Constitutional:       General: She is not in acute distress. Appearance: She is well-developed. She is not diaphoretic. Cardiovascular:      Rate and Rhythm: Normal rate and regular rhythm. Heart sounds: Normal heart sounds. No murmur. No friction rub. No gallop. Pulmonary:      Effort: Pulmonary effort is normal. No respiratory distress. Breath sounds: Normal breath sounds. No wheezing or rales. Skin:     General: Skin is warm and dry. Neurological:      Mental Status: She is alert and oriented to person, place, and time. ASSESSMENT and PLAN    ICD-10-CM ICD-9-CM    1. Elevated BP without diagnosis of hypertension  R03.0 796.2    2. Renal stones  N20.0 592.0 REFERRAL TO UROLOGY   3. Lymphadenopathy, abdominal  R59.0 785.6 CT ABD PELV W WO CONT   4. Primary osteoarthritis, unspecified site  M19.91 715.10 REFERRAL TO RHEUMATOLOGY   5. Right upper quadrant abdominal pain  R10.11 789.01 REFERRAL TO PHYSICAL THERAPY        Blood pressure now normal  Renal stone, she has a history of chronic renal stones  Chronic abdominal pain likely musculoskeletal  Urology referral  Repeat CT in 6 months  Rheumatology referral  PT referral    Follow-up and Dispositions    · Return if symptoms worsen or fail to improve. Reviewed plan of care. Patient has provided input and agrees with goals.

## 2020-07-31 ENCOUNTER — OFFICE VISIT (OUTPATIENT)
Dept: RHEUMATOLOGY | Age: 45
End: 2020-07-31

## 2020-07-31 VITALS
RESPIRATION RATE: 18 BRPM | HEIGHT: 64 IN | TEMPERATURE: 97.7 F | BODY MASS INDEX: 42.17 KG/M2 | SYSTOLIC BLOOD PRESSURE: 117 MMHG | DIASTOLIC BLOOD PRESSURE: 80 MMHG | WEIGHT: 247 LBS | HEART RATE: 74 BPM

## 2020-07-31 DIAGNOSIS — M06.4 UNDIFFERENTIATED INFLAMMATORY ARTHRITIS (HCC): Primary | ICD-10-CM

## 2020-07-31 DIAGNOSIS — M22.2X1 PATELLOFEMORAL ARTHRALGIA OF BOTH KNEES: ICD-10-CM

## 2020-07-31 DIAGNOSIS — G89.29 CHRONIC BACK PAIN GREATER THAN 3 MONTHS DURATION: ICD-10-CM

## 2020-07-31 DIAGNOSIS — M79.7 FIBROMYALGIA: ICD-10-CM

## 2020-07-31 DIAGNOSIS — M22.2X2 PATELLOFEMORAL ARTHRALGIA OF BOTH KNEES: ICD-10-CM

## 2020-07-31 DIAGNOSIS — M54.9 CHRONIC BACK PAIN GREATER THAN 3 MONTHS DURATION: ICD-10-CM

## 2020-07-31 RX ORDER — PREDNISONE 5 MG/1
TABLET ORAL
Qty: 91 TAB | Refills: 0 | Status: SHIPPED | OUTPATIENT
Start: 2020-07-31 | End: 2020-09-14

## 2020-07-31 NOTE — PATIENT INSTRUCTIONS
I will start you on a short course of prednisone, called a prednisone taper, with 5 mg tablets. This regimen is to be taken as follows:  
 
 - 4 tablets (20 mg), all at once, daily for 7 days - 3 tablets (15 mg), all at once, daily for 7 days - 2 tablets (10 mg), all at once, daily for 14 days - 1 tablet (5 mg), daily for 14 days - then STOP Message on [employer] me when you start 3 tablets a day and inform me how you are feeling. I referred you to physical therapy for your back and knee pain. Patellofemoral Pain Syndrome: Care Instructions Your Care Instructions Patellofemoral pain syndrome is pain in the front of the knee. It is caused by overuse, weak thigh muscles (quadriceps), or a problem with the way the kneecap moves. Extra weight may also cause this syndrome. The patella is the kneecap, and the femur is the thighbone. Some people may have pain in the front of the knee from a condition called chondromalacia. In this problem, the underside of the knee cartilage wears down and frays. Cartilage is a rubbery tissue that cushions joints. In some cases, the kneecap does not move, or track, in a normal way. You may have knee pain when you run, walk down hills or steps, or do another activity. Sitting for a long time also can cause knee pain. Your knee pain may get better with medicines for pain and swelling and exercises to make your quadriceps stronger. Losing weight, if you need to, may also help with pain. Follow-up care is a key part of your treatment and safety. Be sure to make and go to all appointments, and call your doctor if you are having problems. It's also a good idea to know your test results and keep a list of the medicines you take. How can you care for yourself at home?  
· Ask your doctor if you can take an over-the-counter pain medicine, such as acetaminophen (Tylenol), ibuprofen (Advil, Motrin), or naproxen (Aleve). Be safe with medicines. Read and follow all instructions on the label. · Rest and protect your knee. Take a break from activities that cause pain, such as long periods of sitting or kneeling. · Put ice or a cold pack on your knee for 10 to 20 minutes after activity. Put a thin cloth between the ice and your skin. · If your doctor recommends an elastic bandage, sleeve, or other type of support for your knee, wear it as directed. · If your knee is not swollen, you can put moist heat, a heating pad, or a warm cloth on your knee. After several days of rest, you can begin gentle exercise of your knee. · Reach and stay at a healthy weight. Being overweight puts stress on your knees. · Wear athletic shoes that offer good support, especially if you run. · Use shoe inserts, or orthotics, if they help reduce your knee pain. Many drugstores and shoe stores sell them. · See a physical therapist to learn more exercises and stretches to make your legs stronger. When should you call for help? Watch closely for changes in your health, and be sure to contact your doctor if: 
· Your knee pain does not get better or gets worse. Where can you learn more? Go to http://yamilet-haylee.info/ Enter Z257 in the search box to learn more about \"Patellofemoral Pain Syndrome: Care Instructions. \" Current as of: March 2, 2020               Content Version: 12.5 © 3216-4313 [employer]. Care instructions adapted under license by [employer] (which disclaims liability or warranty for this information). If you have questions about a medical condition or this instruction, always ask your healthcare professional. [address] any warranty or liability for your use of this information. FIBROMYALGIA Fibromyalgia is a disease characterized by chronic widespread musculoskeletal pain.  Fibromyalgia is caused by abnormal processing of pain signals in the central nervous system, leading to exaggerated pain responses. There are functional MRI studies that have shown neuroplasticity (re-wiring) of the pain pathways in the brain. Physical and Mental Exercise The mainstay of therapy includes non-pharmacologic therapies such as cardiovascular exercise and Cognitive Behavioral Therapy which have been shown to be of benefit ([address] [facility], Am J Med 2009). Christiano Chi in particular has proven efficacy in the treatment of fibromyalgia Gina Null al. [facility] J Med 2010; 005:[id number]). Performing at least 60 minutes per day of Leretha Dessert has been shown to improve pain without medical management. Medications After discussing with your primary care and if medications are pursued, pregabalin (Lyrica), gabapentin (Neurontin), milnacipran ([place] Keel), and duloxetine (Cymbalta) are FDA approved medications for the treatment of fibromyalgia. Narcotic Pain Medications Are BAD Narcotics, such as oxycodone, hydrocodone, morphine, dilaudid, or codeine, have not been proven to be efficacious in the treatment of fibromyalgia. In fact, narcotic use in this patient population has been observed to exacerbate depression, and may enhance the hyperalgesia which is characteristic of this condition (Romelia Richard Rheum 2006). They also are at increased risk for opioid-induced hyperalgesia due predominantly to central sensitization Pamella Donohue al. [facility] Clin Rheumatol. 2013 Mar;19(2):72-7). Specifically, a double-blind placebo-controlled trial by Sofia Sewell al published in 1995 demonstrated that intravenous morphine did not reduce pain in fibromyalgia patients. A study by Marcela Gamboa al published in 2003 showed that fibromyalgia patients taking oral opiates did not experience improvement in their pain at four years of follow up, and also reported increased depression over the last two years of the study.  There is subsequent concern that the prolonged use of narcotics to treat fibromyalgia may cause harm to these patients Kamaljit Morillo, Pain 2005). Finally, opioid use in fibromyalgia had poorer symptoms and functional and occupational status compared to nonusers (Abhishek REYES et al. Pain Res Treat. 4434;[id number]). Therefore, I recommend that narcotics be avoided in all patients with fibromyalgia. My Recommendations I recommend Christiano Chi stretching exercises for at least 30 minutes per day. The [employer] has made a videotape of Christiano Chi that you can borrow from [employer], purchase online or watch for free on [email or web address]. com Christiano Chi for Arthritis. I strongly recommend you to join a gym that has an indoor pool, to do aqua therapy and Christiano Chi classes. Resources include: [employer], [employer], [employer], and the [facility]. We discussed treating secondary causes, such as sleep apnea, poor sleep quality, depression, anxiety, vitamin deficiencies, such as vitamin D, and pursuing aquatherapy.  I encourage you to do [address].

## 2020-07-31 NOTE — PROGRESS NOTES
REASON FOR VISIT    This is the initial evaluation for Ms. Arpita Broussard a 38 y.o.  female for question of an inflammatory arthritis. The patient is referred to the [facility] at the request of Dr. Joni George. HISTORY OF PRESENT ILLNESS      I have reviewed and summarized old records from [employer] and [address] [place] [address]    In 2010, she developed low back pain, knees, hands, ankles, outer hips, and shoulders. She was told her in youth had she had no cartilage in her knees \"bone on bone. \"    In 6/01/2011, MRI Lumbar Spine without contrast showed Lumbar spine alignment is within normal limits. Chronic Schmorl's node formation in the superior endplates of L1 and L3 and in the inferior  endplate of L5. No compression deformity. Mild multilevel disc desiccation. Conus medullaris terminates at T12-L1. Signal and caliber of the distal cord and conus are within normal limits. Paraspinal soft tissues are within normal limits. T12-L1: facet arthrosis. No herniation or stenosis. L1-L2: Facet arthrosis. No herniation or stenosis. L2-L3: Diffuse disc bulge, facet arthrosis, and thickened ligamentum flavum. Mild central spinal canal stenosis. L3-L4: Diffuse disc bulge, facet arthrosis, and thickened ligamentum flavum. No stenosis. L4-L5: Diffuse disc bulge, facet arthrosis, and thickened ligamentum flavum. No stenosis. L5-S1: Diffuse disc bulge, facet arthrosis, and thickened ligamentum flavum. Mild left foraminal stenosis. In 6/16/2013, Bilateral knee radiograph showed overall alignment is normal. The joint spaces are preserved and only minimal arthritic changes are present, with tiny osteophytes present at the margins of the femorotibial compartments bilaterally. There is no evidence of joint effusion, erosions, or other features to suggest inflammatory arthritis. Bilateral hand radiograph showed Alignment and bone mineralization is normal bilaterally. No significant arthritic changes are present.     In 5/12/2014, MRI Lumbar Spine without contrast showed Alignment: There are 5 nonrib-bearing lumbar type vertebra. There is a normal lumbar lordosis without listhesis. Intervertebral discs: There is L2 and L5 intervertebral disc desiccation with mild height loss at L2 and moderate height loss at the L5 level with mild bulging at L2 and more moderate bulging, anterior extrusion, and dorsal disc protrusion of the L5 disc level. Vertebral bodies/ marrow: There is endplate Modic type II signal demonstrated at the T12, L2 and L5 levels. A small Schmorl's node is seen at the anterior, superior L1 vertebral level. There is no marrow signal to indicate a marrow replacement process or fracture. Conus and cauda equina: The conus terminates at the T12 level and demonstrates normal signal characteristics. The nerve roots of the cauda equina are normal in appearance. Paravertebral soft tissues: There is no paraspinal mass or fluid collection identified. By level: L1-L2: No canal or neural foraminal stenosis. L2-L3: Mild concentric disc bulge canal or neural foraminal stenosis. L3-L4: There is mild facet osteoarthritis without canal or neural foraminal stenosis. L4-L5: There is a left foraminal disc protrusion without canal or neural foraminal stenosis. Mild facet osteoarthritis is also noted. L5-S1: There is a broad-based left paracentral disc protrusion which is not associated with canal or neural foraminal stenosis. In 6/11/2014, labs showed negative INDIGO IFA, rheumatoid factor, ESR 15 mm/hr, CRP 1.8 mg/dL    In 6/03/2020, labs showed WBC 5.3, lymphocytes 1.1, Hct 36.1%, platelets 234,070, creatinine 0.85 mg/dL, eGFR >60, albumin 3.7 g/dL,  U/L, ALT 26 U/L, AST 31 U/L, TSH 3.15    In 6/25/2020, SPEP and UPEP/JOHAN were normal.     Today, she complains of chronic joint pain since her 20's.  Her has chronic hand pain since 2010 which she describes as aching, throbbing, dull, stabbing that is intermittent and flares on its own and resolves on its own. It may be associated with use, such as cooking. It is not daily, but some days it is more intense. At times, she has swelling in her hands at different times of the day that is not often but cannot take her rings off during these episodes. She has morning stiffness lasting 20 to 30 minutes. NSAIDs have not helped much. She has not received steroids. In the morning, she has tingling and numbness in her hands. She has not seen a neurologist. She also complains of intermittent aching pain in her elbows and has a hard time straightening them or bending. These episodes would last 30 to 60 minutes and resolves on its own. It can happen at rest or with activity. She denies elbow pain in the morning. She also complains of shoulder pain, which she attributes to how she sleeps, since she sleeps on them. She notices that is worse with lifting heavy objects or certain movement. She has not done physical therapy for her shoulders. She chronic aching throbbing knee pain that is worse without any known factors. She relieving factors. She has not done physical therapy. She has not received steroid injections. She has not seen orthopedics. Therapy History includes:  Current DMARD therapy includes: none  Prior DMARD therapy includes: none  The following DMARDs have been ineffective: none  The following DMARDs were stopped because of side effects: none    REVIEW OF SYSTEMS    A 15 point review of systems was performed and summarized below. The questionnaire was reviewed with the patient and scanned into the patient's medical record.     General: denies recent weight gain, recent weight loss, fatigue, weakness, fever, drenching night sweats  Musculoskeletal: endorses joint pain, morning stiffness (lasting 30 minutes), muscle pain, denies joint swelling  Ears: denies ringing in ears, hearing loss, deafness  Eyes: denies pain, light sensitive, redness, blindness, double vision, blurred vision, excess tearing, dryness, foreign body sensation  Mouth: denies sore tongue, oral ulcers, loss of taste, dryness, increased dental caries  Nose: denies nosebleeds, nasal ulcers  Throat: denies food stuck when swallowing, difficulty with swallowing, hoarseness, pain in jaw while chewing  Neck: denies swollen glands, tender glands  Cardiopulmonary: denies pain in chest with deep breaths, pain in chest when lying down, murmurs, sudden changes in heart beat, wheezing, dry cough, productive cough, shortness of breath at rest, shortness of breath on exertion, coughing of blood  Gastrointestinal: denies nausea, heartburn, stomach pain relieved by food, chronic constipation, chronic diarrhea, blood in stools, black stools  Genitourinary: denies vaginal dryness, pain or burning on urination, blood in urine, cloudy urine, vaginal ulcers   Hematologic: denies anemia, bleeding tendency, blood clots, bleeding gums  Skin: denies easy bruising, hair loss, rash, rash worsened after sun exposure, hives/urticaria, skin thickening, skin tightness, nodules/bumps, color changes of hands or feet in the cold (Raynaud's)  Neurologic: denies numbness or tingling in hands, numbness or tingling in feet, muscle weakness  Psychiatric: denies depression, excessive worries, PTSD, Bipolar  Sleep: endorses poor sleep (5-8 hours), snoring, difficulty falling asleep, difficulty staying asleep , denies apnea, daytime somnolence    PAST MEDICAL HISTORY    She has a past medical history of DDD (degenerative disc disease), lumbosacral, Migraine, Osteoarthritis, Renal stones, Sciatica, and Tubular adenoma of colon (6/23/2020). FAMILY HISTORY    Her family history includes Arthritis-rheumatoid in her father; Gout in her brother and mother; No Known Problems in her sister, son, and another family member. SOCIAL HISTORY    She reports that she quit smoking about 5 years ago. She smoked 0.50 packs per day.  She has never used smokeless tobacco. She reports previous alcohol use of about 3.3 standard drinks of alcohol per week. She reports that she does not use drugs. GYNECOLOGIC HISTORY     2, Para 2, Living 2, Miscarriage 0    She denies severe pre-eclampsia, eclampsia or placental insufficiency    HEALTH MAINTENANCE    Immunizations  Immunization History   Administered Date(s) Administered    Tdap 2016       Age Appropriate Cancer Screening    Colonoscopy: 2020  Mammogram:     MEDICATIONS    Current Outpatient Medications   Medication Sig Dispense Refill    predniSONE (DELTASONE) 5 mg tablet 4 tabs daily for 7 days, 3 tabs for 7 days, 2 tabs for 14 days, 1 tab for 14 days 91 Tab 0    pantoprazole (PROTONIX) 20 mg tablet Take 20 mg by mouth daily.  polyethylene glycol (Miralax) 17 gram/dose powder Take 17 g by mouth daily. ALLERGIES    Allergies   Allergen Reactions    Sulfa (Sulfonamide Antibiotics) Rash       PHYSICAL EXAMINATION    Visit Vitals  /80   Pulse 74   Temp 97.7 °F (36.5 °C)   Resp 18   Ht 5' 4\" (1.626 m)   Wt 247 lb (112 kg)   BMI 42.40 kg/m²     Body mass index is 42.4 kg/m². General: Patient is alert, oriented x 3, not in acute distress    HEENT:   Conjunctiva are not injected and appear moist, there is no alopecia, neck is supple    Cardiovascular:  Heart is regular rate and rhythm, no murmurs. Chest:  Lungs are clear to auscultation bilaterally. Extremities:  Free of clubbing, cyanosis, edema, extremities well perfused. Neurological exam:  Muscle strength is full in upper and lower extremities. Skin exam:  There are no rashes, no tophi, no psoriasis, no active Raynaud's, no livedo reticularis, no periungual erythema. Musculoskeletal exam:  A comprehensive musculoskeletal exam was performed for all joints of each upper and lower extremity and assessed for swelling, tenderness and range of motion.  Pertinent results are documented as below:     tender points  No interphalangeal tenderness  Bilateral patellar laxity with tenderness    Z-Deformities:   no  [place] Neck Deformities:  no  Boutonierre's Deformities:  no  Ulnar Deviation:   no  MCP Subluxation:  no    Joint Count 7/31/2020   Patient pain (0-100) 60   MHAQ 0.875   Left wrist- Tender 1   Left wrist- Swollen 0   Left 1st MCP - Tender 1   Left 1st MCP - Swollen 0   Left 2nd PIP - Tender 1   Left 2nd PIP - Swollen 1   Left 3rd PIP - Tender 1   Left 3rd PIP - Swollen 1   Left 4th PIP - Tender 1   Left 4th PIP - Swollen 0   Left 5th PIP - Tender 1   Left 5th PIP - Swollen 0   Right wrist- Tender 1   Right wrist- Swollen 1   Right 1st MCP - Tender 1   Right 1st MCP - Swollen 1   Right 3rd MCP - Tender 1   Right 3rd MCP - Swollen 1   Right 4th MCP - Tender 1   Right 4th MCP - Swollen 0   Right 5th MCP - Tender 1   Right 5th MCP - Swollen 0   Right 2nd PIP - Tender 1   Right 2nd PIP - Swollen 1   Right 3rd PIP - Tender 1   Right 3rd PIP - Swollen 1   Right 4th PIP - Tender 1   Right 4th PIP - Swollen 1   Tender Joint Count (Total) 14   Swollen Joint Count (Total) 8   Physician Assessment (0-10) 3   Patient Assessment (0-10) 8   CDAI Total (calculated) 33       DATA REVIEW    Prior medical records were reviewed and are summarized as below:    Laboratory data: summarized in the HPI    Imaging: summarized in the HPI. ASSESSMENT AND PLAN    1) Undifferentiated Inflammatory Arthritis. Her history is not consistent with an inflammatory process, however, her examination revealed tender and swollen joints, suggestive for synovitis. Her CDAI was 33 with 14 tender and 8 swollen joints, consistent with high disease activity. I will start her on a short course of prednisone, called a prednisone taper, with 5 mg tablets. This regimen is to be taken as follows: 4 tabs (20 mg) for 7 days, 3 tabs (15 mg) for 7 days, 2 tabs (10 mg) for 14 days and then 1 tab (5 mg) for 14 days, and then stop.     I asked her to message me before she starts 15 mg weekly on MyChart to determine response. I ordered labs. 2) Fibromyalgia. her history, constellation of symptoms, and examination are consistent with a pain syndrome. I suspect this may be secondary to #1, if she feels better on prednisone. Fibromyalgia is a disease characterized by chronic widespread musculoskeletal pain. Fibromyalgia is caused by abnormal processing of pain signals in the central nervous system, leading to exaggerated pain responses. Non-pharmacologic therapies such as cardiovascular exercise and Cognitive Behavioral Therapy have been shown to be of benefit ([facility], Am J Med 2009). Christiano Chi in particular has proven efficacy in the treatment of fibromyalgia Ok OLIVIA Trujillo 2010). If pharmacotherapy is pursued, pregabalin (Lyrica), gabapentin (Neurontin), milnacipran (Nelda Rodrigez), and duloxetine (Cymbalta) are FDA approved medications for the treatment of fibromyalgia. Narcotics have not been proven to be efficacious in the treatment of fibromyalgia. In fact, narcotic use in this patient population has been observed to exacerbate depression, and may enhance the hyperalgesia which is characteristic of this condition (Olegario Heriberto Rheum 2006). They also are at increased risk for opioid-induced hyperalgesia due predominantly to central sensitization Kelly Donohue al. [facility] Clin Rheumatol. 2013 Mar;19(2):72-7). Specifically, a double-blind placebo-controlled trial by Mark Corrigan al published in 1995 demonstrated that intravenous morphine did not reduce pain in fibromyalgia patients. A study by Pham Stiles al published in 2003 showed that fibromyalgia patients taking oral opiates did not experience improvement in their pain at four years of follow up, and also reported increased depression over the last two years of the study. There is subsequent concern that the prolonged use of narcotics to treat fibromyalgia may cause harm to these patients Helio Sheppard, Pain 2005).  Finally, opioid use in fibromyalgia had poorer symptoms and functional and occupational status compared to nonusers (Abhishek REYES et al. Pain Res Treat. 9672;7793:827394). We therefore recommend that narcotics be avoided in all patients with fibromyalgia. We recommend Christiano Chi stretching exercises for at least 30 minutes per day. The [employer] has made a videotape of Christiano Chi that She can borrow from [employer], purchase online or watch for free on [email or web address]. [email or web address] Christiano Chi for Arthritis. We discussed treating secondary causes, such as sleep apnea, poor sleep quality, depression, anxiety, weight loss, vitamin deficiencies, such as vitamin D, and pursuing aquatherapy. I encouraged her to do [address]. My recommendations were provided to her and she was informed to follow up with her primary care physician for further management of her fibromyalgia. 3) Patellofemoral Arthralgia. This is a benign knee condition due to mistracking of the patella that may cause pain in the knees with prolonged sitting, or going up or down stairs. This tends to be present in hypermobile knees and may become worse if obesity is present. Treatment is to strengthen the quadriceps muscles and weight loss. I referred the patient to physical therapy. 4) Chronic Back Pain. I referred the patient to physical therapy. The patient voiced understanding of the aforementioned assessment and plan. Summary of plan was provided in the After Visit Summary patient instructions. I also provided education about MyChart setup and utility.     TODAY'S ORDERS    Orders Placed This Encounter    QUANTIFERON-TB GOLD PLUS    CYCLIC CITRUL PEPTIDE AB, IGG    CBC WITH AUTOMATED DIFF    CHRONIC HEPATITIS PANEL    METABOLIC PANEL, COMPREHENSIVE    C REACTIVE PROTEIN, QT    SED RATE (ESR)    HLA-B27    RHEUMATOID FACTOR, QL    PROTEIN ELECTROPHORESIS W/ REFLX JOHAN    URIC ACID    VITAMIN D, 25 HYDROXY    REFERRAL TO PHYSICAL THERAPY    REFERRAL TO PHYSICAL THERAPY    predniSONE (DELTASONE) 5 mg tablet     Future Appointments   Date Time Provider Department Center   7/31/2020 12:00 PM Neal Manuel MD [facility]   1/19/2021 10:00 AM [facility] CT 1 SFCT . Yanely Be MD, [address] [place] [address]    Adult Rheumatology   Rheumatology Ultrasound Certified  [facility]  A Part of [facility]  [id number] [place] [address], [address] [place] [address] Road   Phone [phone]  Fax [phone]

## 2020-07-31 NOTE — LETTER
7/31/20 Patient: Leonora Carr YOB: 1975 Date of Visit: 7/31/2020 Patrick Castro MD 
[address] [place] [address] [address] [place] [address] [phone] VIA In Basket Dear Patrick Castro MD, Thank you for referring Ms. Kamaljit Sands to [address] [place] [address] for evaluation. My notes for this consultation are attached. If you have questions, please do not hesitate to call me. I look forward to following your patient along with you.  
 
 
Sincerely, 
 
Veronica Freitas MD

## 2020-08-06 LAB
25(OH)D3+25(OH)D2 SERPL-MCNC: 15.4 NG/ML (ref 30–100)
ALBUMIN SERPL ELPH-MCNC: 3.6 G/DL (ref 2.9–4.4)
ALBUMIN SERPL-MCNC: 4.2 G/DL (ref 3.8–4.8)
ALBUMIN/GLOB SERPL: 1 {RATIO} (ref 0.7–1.7)
ALBUMIN/GLOB SERPL: 1.4 {RATIO} (ref 1.2–2.2)
ALP SERPL-CCNC: 145 IU/L (ref 39–117)
ALPHA1 GLOB SERPL ELPH-MCNC: 0.2 G/DL (ref 0–0.4)
ALPHA2 GLOB SERPL ELPH-MCNC: 0.7 G/DL (ref 0.4–1)
ALT SERPL-CCNC: 16 IU/L (ref 0–32)
AST SERPL-CCNC: 27 IU/L (ref 0–40)
B-GLOBULIN SERPL ELPH-MCNC: 1.3 G/DL (ref 0.7–1.3)
BASOPHILS # BLD AUTO: 0 X10E3/UL (ref 0–0.2)
BASOPHILS NFR BLD AUTO: 0 %
BILIRUB SERPL-MCNC: 0.4 MG/DL (ref 0–1.2)
BUN SERPL-MCNC: 9 MG/DL (ref 6–24)
BUN/CREAT SERPL: 12 (ref 9–23)
CALCIUM SERPL-MCNC: 9.5 MG/DL (ref 8.7–10.2)
CCP IGA+IGG SERPL IA-ACNC: 4 UNITS (ref 0–19)
CHLORIDE SERPL-SCNC: 101 MMOL/L (ref 96–106)
CO2 SERPL-SCNC: 25 MMOL/L (ref 20–29)
COMMENT, 144067: NORMAL
CREAT SERPL-MCNC: 0.77 MG/DL (ref 0.57–1)
CRP SERPL-MCNC: 33 MG/L (ref 0–10)
EOSINOPHIL # BLD AUTO: 0.1 X10E3/UL (ref 0–0.4)
EOSINOPHIL NFR BLD AUTO: 3 %
ERYTHROCYTE [DISTWIDTH] IN BLOOD BY AUTOMATED COUNT: 14.9 % (ref 11.7–15.4)
ERYTHROCYTE [SEDIMENTATION RATE] IN BLOOD BY WESTERGREN METHOD: 68 MM/HR (ref 0–32)
GAMMA GLOB SERPL ELPH-MCNC: 1.4 G/DL (ref 0.4–1.8)
GAMMA INTERFERON BACKGROUND BLD IA-ACNC: 0.06 IU/ML
GLOBULIN SER CALC-MCNC: 3.1 G/DL (ref 1.5–4.5)
GLOBULIN SER CALC-MCNC: 3.7 G/DL (ref 2.2–3.9)
GLUCOSE SERPL-MCNC: 82 MG/DL (ref 65–99)
HBV CORE AB SERPL QL IA: NEGATIVE
HBV CORE IGM SERPL QL IA: NEGATIVE
HBV E AB SERPL QL IA: NEGATIVE
HBV E AG SERPL QL IA: NEGATIVE
HBV SURFACE AB SER QL: NON REACTIVE
HBV SURFACE AG SERPL QL IA: NEGATIVE
HCT VFR BLD AUTO: 35 % (ref 34–46.6)
HCV AB S/CO SERPL IA: 0.1 S/CO RATIO (ref 0–0.9)
HGB BLD-MCNC: 10.9 G/DL (ref 11.1–15.9)
HLA-B27 QL NAA+PROBE: NEGATIVE
IMM GRANULOCYTES # BLD AUTO: 0 X10E3/UL (ref 0–0.1)
IMM GRANULOCYTES NFR BLD AUTO: 0 %
LYMPHOCYTES # BLD AUTO: 1 X10E3/UL (ref 0.7–3.1)
LYMPHOCYTES NFR BLD AUTO: 20 %
M PROTEIN SERPL ELPH-MCNC: NORMAL G/DL
M TB IFN-G BLD-IMP: NEGATIVE
M TB IFN-G CD4+ BCKGRND COR BLD-ACNC: 0.07 IU/ML
MCH RBC QN AUTO: 25.2 PG (ref 26.6–33)
MCHC RBC AUTO-ENTMCNC: 31.1 G/DL (ref 31.5–35.7)
MCV RBC AUTO: 81 FL (ref 79–97)
MITOGEN IGNF BLD-ACNC: 1.7 IU/ML
MONOCYTES # BLD AUTO: 0.3 X10E3/UL (ref 0.1–0.9)
MONOCYTES NFR BLD AUTO: 6 %
NEUTROPHILS # BLD AUTO: 3.4 X10E3/UL (ref 1.4–7)
NEUTROPHILS NFR BLD AUTO: 71 %
PLATELET # BLD AUTO: 261 X10E3/UL (ref 150–450)
PLEASE NOTE, 011150: NORMAL
POTASSIUM SERPL-SCNC: 4.7 MMOL/L (ref 3.5–5.2)
PROT PATTERN SERPL ELPH-IMP: NORMAL
PROT SERPL-MCNC: 7.3 G/DL (ref 6–8.5)
QUANTIFERON INCUBATION, QF1T: NORMAL
QUANTIFERON TB2 AG: 0.06 IU/ML
RBC # BLD AUTO: 4.33 X10E6/UL (ref 3.77–5.28)
RHEUMATOID FACT SERPL-ACNC: 10.5 IU/ML (ref 0–13.9)
SERVICE CMNT-IMP: NORMAL
SODIUM SERPL-SCNC: 142 MMOL/L (ref 134–144)
URATE SERPL-MCNC: 6.1 MG/DL (ref 2.5–7.1)
WBC # BLD AUTO: 4.8 X10E3/UL (ref 3.4–10.8)

## 2020-08-07 NOTE — PROGRESS NOTES
The results were reviewed. Elevated inflammatory markers (ESR 68 mm/hr, CRP 33 mg/L, ). All remaining labs are normal/negative.

## 2020-08-08 ENCOUNTER — PATIENT MESSAGE (OUTPATIENT)
Dept: RHEUMATOLOGY | Age: 45
End: 2020-08-08

## 2020-08-08 DIAGNOSIS — M06.4 UNDIFFERENTIATED INFLAMMATORY ARTHRITIS (HCC): Primary | ICD-10-CM

## 2020-08-10 RX ORDER — METHOTREXATE 2.5 MG/1
15 TABLET ORAL
Qty: 72 TAB | Refills: 0 | Status: SHIPPED | OUTPATIENT
Start: 2020-08-11 | End: 2020-10-27 | Stop reason: SDUPTHER

## 2020-08-10 RX ORDER — FOLIC ACID 1 MG/1
1 TABLET ORAL DAILY
Qty: 90 TAB | Refills: 0 | Status: SHIPPED | OUTPATIENT
Start: 2020-08-10 | End: 2020-10-27 | Stop reason: SDUPTHER

## 2020-08-10 NOTE — TELEPHONE ENCOUNTER
From: Sapphire Duran  Sent: 8/10/2020 6:57 AM EDT  To: Yon Godoy MD  Subject: RE: Non-Urgent Medical Question    It is helping with my hands and wrists. Just was a rough night with pain last night for a few hours.

## 2020-08-26 ENCOUNTER — HOSPITAL ENCOUNTER (OUTPATIENT)
Dept: PHYSICAL THERAPY | Age: 45
Discharge: HOME | End: 2020-08-26
Payer: COMMERCIAL

## 2020-08-26 PROCEDURE — 97162 PT EVAL MOD COMPLEX 30 MIN: CPT | Performed by: PHYSICAL THERAPIST

## 2020-08-26 PROCEDURE — 97163 PT EVAL HIGH COMPLEX 45 MIN: CPT

## 2020-08-26 NOTE — PROGRESS NOTES
PT INITIAL EVALUATION NOTE 2-15    Patient Name: Jared Castillo  Date:2020  : 1975  [x]  Patient  Verified  Payor: Bette Laz[address] / Plan: [address] / Product Type: Managed Care Medicaid /    In time:8:10 AM  Out time: 9:00 AM  Total Treatment Time (min): 50  Visit #: 1    Treatment Area: Low back pain [M54.5]  Bilateral knee pain [M25.561, M25.562]    SUBJECTIVE  Pain Level (0-10 scale): 4/10  Any medication changes, allergies to medications, adverse drug reactions, diagnosis change, or new procedure performed?: [] No    [x] Yes (see summary sheet for update)  Subjective:     Pt c/o low back pain and HOANG knee pain that has been present since . Pt reports that sitting down and resting alleviates her pain whereas any activity makes it worse. Pt reports that she has trouble getting up if she lays down and that her pain limits her ability to perform most functional activities.    PLOF: Pt reports that she used to work numerous jobs that required her to be active  Mechanism of Injury: Insidious onset  Previous Treatment/Compliance: Pt has sought physical therapy for one of her knees in the past after experiencing a fall at work; has gotten medications from doctor  PMHx/Surgical Hx: fibromyalgia, arthritis; reports patellofemoral pain syndrome and degenerative disc disease  Work Hx: currently unemployed; reports that she used to work at [employer] and as a [employer]   Living Situation: lives in single story home with family   Pt Goals: decrease pain, improve functioning  Barriers: chronicity, severity  Motivation: highly motivated  Substance use: N/A  FABQ Score: NT  Cognition: A & O x 4          OBJECTIVE/EXAMINATION    LUMBAR    Posture: excessive pronation in weightbearing   Other Observations:  Gait and Functional Mobility: Pt walks with decreased stride length, no arm swing on the L, a cane in her R hand  Palpation: TTP in the lumbar spine  Joint Mobility: unable to assess due to p! Lumbar AROM:        R  L  Flexion    19\" - p! Extension   Unable to perform due to p! Side Bending   22.5\" - p!  24\" - p! Rotation   16\" - p!  14.5\" - p! Aberrant movements:  Painful arc: [x] Yes  [] No  Instability catch: [] Yes  [x] No  Difficulty returning from flexion: [x] Yes  [] No  Reversal of lumbopelvic rhythm: [] Yes  [x] No      LOWER QUARTER   MUSCLE STRENGTH  KEY       R  L  0 - No Contraction  L1, L2 Psoas  4-/5  4-/5    1 - Trace   L3 Quads  4/5  4/5    2 - Poor   L4 Tib Ant  4/5  4/5    3 - Fair    L5 EHL  4/5  4/5  4 - Good   S1 FHL  4/5  4/5  5 - Normal   S2 Hams  4-/5  4-/5          MMT:  Core strength: NT   Hip abduction: 4-/5   Hip extension: 3/5  Neurological: Sensation: decreased sensation in R L4 dermatome   Special Tests:        Slump: (+) HOANG for knee and back p! Jamshid: NT        Olga: (-) HOANG    SLR: (+) at 20 deg HOANG for back p! BUD: NT due to p! Long Sit: NT    SI compression/ Distraction: NT   Active SLR: NT         KNEE    AROM, PROM:        R  L  Hip IR  Hip ER  Knee Flexion  WNL  WNL       Knee Extension WNL  WNL       Ankle DF  WNL  WNL      Special Tests: Kelsy's: NT due to p! Apley's: NT       Jamshid: NT        Olga: (-) HOANG   H.S. SLR: NT        Long Sit: NT         BUD: NT due to p! Single Leg Stance: unable to perform             Other Objective/Functional Measures: Pt unable to perform the majority of functional measures due to pain; experienced pain during all transitions     Pain Level (0-10 scale) post treatment: 4/10      ASSESSMENT:   Pt presents with chronic LBP and bilateral knee pain. Pt has decreased LE strength, balance deficits, and is generally deconditioned. Pt would benefit from skilled PT services to improve her strength, balance, functional mobility, and pain.    [x]  See Plan of [facility], [facility] 8/26/2020

## 2020-08-26 NOTE — PROGRESS NOTES
[address] [place]  Phone: [phone]  Fax: [phone]    Plan of Care/ Statement of Necessity for Physical Therapy Services 2-15    Patient name: Stu Romero  : 1975  Provider#: [id number]  Referral source: Burt Solano MD      Medical/Treatment Diagnosis: Low back pain [M54.5]  Bilateral knee pain [M25.561, M25.562]     Prior Hospitalization: see medical history     Comorbidities: arthritis, fibromyalgia; pt reports patellofemoral pain syndrome and degenerative disc disease  Prior Level of Function: used to work numerous jobs that required her to be active and on her feet for extended periods of time  Medications: Verified on Patient Summary List    Start of Care: 2020     Onset Date:        The Plan of Care and following information is based on the information from the initial evaluation. Assessment/ key information: Pt presents with chronic LBP and HOANG knee pain that is worsened by any type of activity. Exam revealed decreased LE strength, impaired balance, and decreased tolerance for functional activities due to pain. Pt would benefit from skilled PT services to address the aforementioned impairments. Evaluation Complexity History HIGH Complexity :3+ comorbidities / personal factors will impact the outcome/ POC ; Examination HIGH Complexity : 4+ Standardized tests and measures addressing body structure, function, activity limitation and / or participation in recreation  ;Presentation HIGH Complexity : Unstable and unpredictable characteristics  ; Clinical Decision Making MEDIUM Complexity : FOTO score of 26-74  Overall Complexity Rating: HIGH     Problem List: pain affecting function, decrease ROM, decrease strength, impaired gait/ balance, decrease ADL/ functional abilitiies, decrease activity tolerance, decrease flexibility/ joint mobility and decrease transfer abilities   Treatment Plan may include any combination of the following: Therapeutic exercise, Therapeutic activities, Neuromuscular re-education, Physical agent/modality, Gait/balance training, Manual therapy, Patient education, Self Care training, Functional mobility training, Home safety training and Stair training  Patient / Family readiness to learn indicated by: asking questions and interest  Persons(s) to be included in education: patient (P)  Barriers to Learning/Limitations: None  Patient Goal (s): I want to decrease my pain and improve my functioning  Patient Self Reported Health Status: fair  Rehabilitation Potential: good    Short Term Goals: To be accomplished in 4 weeks:  1. Pt will demonstrate understanding of and compliance with HEP   2. Pt will transfer from supine on the floor to standing independently and with <4/10 pain in her hip and back  3. Pt will walk for 5 minutes with < 3/10 pain in her hip and back. Long Term Goals: To be accomplished in 12 weeks:  1. Pt will report an ability to transfer supine on the floor to standing and to perform all forms of bed mobility without pain in her back or knees  2. Pt will be able to walk for 10 minutes with no pain in her back or knees  3. Pt will be able to  an object off of the floor with correct body mechanics and with no pain in her back or knees  Frequency / Duration: Patient to be seen 1-2 times per week for 12 weeks. Patient/ Caregiver education and instruction: self care, activity modification and other positioning for application of heat    [x]  Plan of care has been reviewed with TAMAR Lee 8/26/2020     ________________________________________________________________________    I certify that the above Therapy Services are being furnished while the patient is under my care. I agree with the treatment plan and certify that this therapy is necessary.     [de-identified] Signature:____________________  Date:____________Time: _________

## 2020-08-28 ENCOUNTER — HOSPITAL ENCOUNTER (OUTPATIENT)
Dept: PHYSICAL THERAPY | Age: 45
Discharge: HOME | End: 2020-08-28
Payer: COMMERCIAL

## 2020-08-28 PROCEDURE — 97110 THERAPEUTIC EXERCISES: CPT | Performed by: PHYSICAL THERAPIST

## 2020-08-28 PROCEDURE — 97110 THERAPEUTIC EXERCISES: CPT

## 2020-08-28 NOTE — PROGRESS NOTES
PT DAILY TREATMENT NOTE 2-15    Patient Name: Daija Every  Date:2020  : 1975  [x]  Patient  Verified  Payor: [address] [place] [address] Road / Plan: [address] / Product Type: Managed Care Medicaid /    In time: 10:15 AM  Out time: 11:10 AM  Total Treatment Time (min): 55  Visit #: 2    Treatment Area: Low back pain [M54.5]  Bilateral knee pain [M25.561, M25.562]    SUBJECTIVE  Pain Level (0-10 scale): 5/10  Any medication changes, allergies to medications, adverse drug reactions, diagnosis change, or new procedure performed?: [x] No    [] Yes (see summary sheet for update)  Subjective functional status/changes:   [] No changes reported  Pt reports that she is experiencing pain in her hands today    OBJECTIVE    Modality rationale: decrease pain and increase tissue extensibility to improve the patients ability to stand, walk, and sit with less pain    Min Type Additional Details       [] Estim: []Att   []Unatt    []TENS instruct                  []IFC  []Premod   []NMES                     []Other:  []w/US   []w/ice   []w/heat  Position:  Location:       []  Traction: [] Cervical       []Lumbar                       [] Prone          []Supine                       []Intermittent   []Continuous Lbs:  [] before manual  [] after manual  []w/heat    []  Ultrasound: []Continuous   [] Pulsed                       at: []1MHz   []3MHz Location:  W/cm2:    [] Paraffin         Location:   []w/heat   15 []  Ice     [x]  Heat  []  Ice massage Position: sitting  Location: lumbar and HOANG knees    []  Laser  []  Other: Position:  Location:      []  Vasopneumatic Device Pressure:       [] lo [] med [] hi   Temperature:      [x] Skin assessment post-treatment:  [x]intact []redness- no adverse reaction    []redness  adverse reaction:         40 min Therapeutic Exercise:  [x] See flow sheet :   Rationale: increase ROM, increase strength and improve coordination to improve the patients ability to stand, walk, and sit with less pain in her back and knees    With   [x] TE   [] TA   [] neuro   [] other: Patient Education: [x] Review HEP    [] Progressed/Changed HEP based on:   [] positioning   [] body mechanics   [] transfers   [] heat/ice application    [] other:      Other Objective/Functional Measures: Patient experienced pain and decreased tolerance for the majority of exercises, especially those that required trunk      Pain Level (0-10 scale) post treatment: 3/10    ASSESSMENT/Changes in Function:   Patient has significant functional mobility deficits due to decreased strength and ROM as well as back, hip, and hand pain. Patient will continue to benefit from skilled PT services to modify and progress therapeutic interventions, address functional mobility deficits, address ROM deficits, address strength deficits, analyze and cue movement patterns, analyze and modify body mechanics/ergonomics and assess and modify postural abnormalities to attain remaining goals. []  See Plan of Care  []  See progress note/recertification  []  See Discharge Summary         Progress towards goals / Updated goals:  Patient demonstrates understanding of exercises as well as motivation to improve her strength and ROM, but exhibits low functional mobility.      PLAN  [x]  Upgrade activities as tolerated     [x]  Continue plan of care  []  Update interventions per flow sheet       []  Discharge due to:_  []  Other:_      Gabby Chowdary, SPT 8/28/2020

## 2020-08-31 ENCOUNTER — HOSPITAL ENCOUNTER (OUTPATIENT)
Dept: PHYSICAL THERAPY | Age: 45
Discharge: HOME | End: 2020-08-31
Payer: COMMERCIAL

## 2020-08-31 PROCEDURE — 97110 THERAPEUTIC EXERCISES: CPT | Performed by: PHYSICAL MEDICINE & REHABILITATION

## 2020-08-31 NOTE — PROGRESS NOTES
PT DAILY TREATMENT NOTE 2-15    Patient Name: Sapphire Duran  Date:2020  : 1975  [x]  Patient  Verified  Payor: [address] [place] [address] Road / Plan: [address] / Product Type: Managed Care Medicaid /    In time: 10:30 AM  Out time: 11:20AM  Total Treatment Time (min): 50  Visit #: 3    Treatment Area: Low back pain [M54.5]  Bilateral knee pain [M25.561, M25.562]    SUBJECTIVE  Pain Level (0-10 scale): 2/10  Any medication changes, allergies to medications, adverse drug reactions, diagnosis change, or new procedure performed?: [x] No    [] Yes (see summary sheet for update)  Subjective functional status/changes:   [] No changes reported  Pt reports that she is doing pretty well today.     OBJECTIVE    Modality rationale: decrease pain and increase tissue extensibility to improve the patients ability to stand, walk, and sit with less pain    Min Type Additional Details       [] Estim: []Att   []Unatt    []TENS instruct                  []IFC  []Premod   []NMES                     []Other:  []w/US   []w/ice   []w/heat  Position:  Location:       []  Traction: [] Cervical       []Lumbar                       [] Prone          []Supine                       []Intermittent   []Continuous Lbs:  [] before manual  [] after manual  []w/heat    []  Ultrasound: []Continuous   [] Pulsed                       at: []1MHz   []3MHz Location:  W/cm2:    [] Paraffin         Location:   []w/heat   10 []  Ice     [x]  Heat  []  Ice massage Position: sitting  Location: lumbar and HOANG knees    []  Laser  []  Other: Position:  Location:      []  Vasopneumatic Device Pressure:       [] lo [] med [] hi   Temperature:      [x] Skin assessment post-treatment:  [x]intact []redness- no adverse reaction    []redness  adverse reaction:         40 min Therapeutic Exercise:  [x] See flow sheet :   Rationale: increase ROM, increase strength and improve coordination to improve the patients ability to stand, walk, and sit with less pain in her back and knees    With   [x] TE   [] TA   [] neuro   [] other: Patient Education: [x] Review HEP    [] Progressed/Changed HEP based on:   [] positioning   [] body mechanics   [] transfers   [] heat/ice application    [] other:      Other Objective/Functional Measures: Mod difficulty with today's exercises with significant lumbar ROM limitation. Mod fatigue with today's standing exercises. Pain Level (0-10 scale) post treatment: 1/10    ASSESSMENT/Changes in Function:     Patient will continue to benefit from skilled PT services to modify and progress therapeutic interventions, address functional mobility deficits, address ROM deficits, address strength deficits, analyze and cue movement patterns, analyze and modify body mechanics/ergonomics and assess and modify postural abnormalities to attain remaining goals. []  See Plan of Care  []  See progress note/recertification  []  See Discharge Summary         Progress towards goals / Updated goals:  Patient demonstrates understanding of exercises as well as motivation to improve her strength and ROM, but exhibits low functional mobility.      PLAN  [x]  Upgrade activities as tolerated     [x]  Continue plan of care  [x]  Update interventions per flow sheet       []  Discharge due to:_  []  Other:_      Jacob Powell, PTA, OPTA, CPT  8/31/2020

## 2020-09-02 ENCOUNTER — HOSPITAL ENCOUNTER (OUTPATIENT)
Dept: PHYSICAL THERAPY | Age: 45
Discharge: HOME | End: 2020-09-02
Payer: COMMERCIAL

## 2020-09-02 PROCEDURE — 97110 THERAPEUTIC EXERCISES: CPT | Performed by: PHYSICAL MEDICINE & REHABILITATION

## 2020-09-02 NOTE — PROGRESS NOTES
PT DAILY TREATMENT NOTE 2-15    Patient Name: Yao Virgen  Date:2020  : 1975  [x]  Patient  Verified  Payor: [address] [place] [address] Road / Plan: [address] / Product Type: Managed Care Medicaid /    In time: 069 AM  Out time: 3398 AM  Total Treatment Time (min): 55  Visit #: 4    Treatment Area: Low back pain [M54.5]  Bilateral knee pain [M25.561, M25.562]    SUBJECTIVE  Pain Level (0-10 scale): 10  Any medication changes, allergies to medications, adverse drug reactions, diagnosis change, or new procedure performed?: [x] No    [] Yes (see summary sheet for update)  Subjective functional status/changes:   [] No changes reported  Pt reports that she had a slight increase in pain last night for some reason    OBJECTIVE    Modality rationale: decrease pain and increase tissue extensibility to improve the patients ability to stand, walk, and sit with less pain    Min Type Additional Details       [] Estim: []Att   []Unatt    []TENS instruct                  []IFC  []Premod   []NMES                     []Other:  []w/US   []w/ice   []w/heat  Position:  Location:       []  Traction: [] Cervical       []Lumbar                       [] Prone          []Supine                       []Intermittent   []Continuous Lbs:  [] before manual  [] after manual  []w/heat    []  Ultrasound: []Continuous   [] Pulsed                       at: []1MHz   []3MHz Location:  W/cm2:    [] Paraffin         Location:   []w/heat   10 []  Ice     [x]  Heat  []  Ice massage Position: sitting  Location: lumbar and HOANG knees    []  Laser  []  Other: Position:  Location:      []  Vasopneumatic Device Pressure:       [] lo [] med [] hi   Temperature:      [x] Skin assessment post-treatment:  [x]intact []redness- no adverse reaction    []redness  adverse reaction:         45 min Therapeutic Exercise:  [x] See flow sheet :   Rationale: increase ROM, increase strength and improve coordination to improve the patients ability to stand, walk, and sit with less pain in her back and knees    With   [x] TE   [] TA   [] neuro   [] other: Patient Education: [x] Review HEP    [] Progressed/Changed HEP based on:   [] positioning   [] body mechanics   [] transfers   [] heat/ice application    [] other:      Other Objective/Functional Measures: Mod difficulty with today's exercises with significant lumbar ROM limitation. Mod fatigue with today's standing exercises. Pain Level (0-10 scale) post treatment: 2/10    ASSESSMENT/Changes in Function:     Patient will continue to benefit from skilled PT services to modify and progress therapeutic interventions, address functional mobility deficits, address ROM deficits, address strength deficits, analyze and cue movement patterns, analyze and modify body mechanics/ergonomics and assess and modify postural abnormalities to attain remaining goals. []  See Plan of Care  []  See progress note/recertification  []  See Discharge Summary         Progress towards goals / Updated goals:  Patient demonstrates understanding of exercises as well as motivation to improve her strength and ROM, but exhibits low functional mobility.      PLAN  [x]  Upgrade activities as tolerated     [x]  Continue plan of care  [x]  Update interventions per flow sheet       []  Discharge due to:_  []  Other:_      Goldie Read, PTA, OPTA, CPT  9/2/2020

## 2020-09-09 ENCOUNTER — HOSPITAL ENCOUNTER (OUTPATIENT)
Dept: PHYSICAL THERAPY | Age: 45
Discharge: HOME | End: 2020-09-09
Payer: COMMERCIAL

## 2020-09-09 PROCEDURE — 97110 THERAPEUTIC EXERCISES: CPT | Performed by: PHYSICAL MEDICINE & REHABILITATION

## 2020-09-09 NOTE — PROGRESS NOTES
PT DAILY TREATMENT NOTE 2-15    Patient Name: Leonora Carr  Date:2020  : 1975  [x]  Patient  Verified  Payor: [address] [place] [address] Road / Plan: [address] / Product Type: Managed Care Medicaid /    In time: 900 AM  Out time: 618 AM  Total Treatment Time (min): 55  Visit #: 5    Treatment Area: Low back pain [M54.5]  Bilateral knee pain [M25.561, M25.562]    SUBJECTIVE  Pain Level (0-10 scale): 2/10  Any medication changes, allergies to medications, adverse drug reactions, diagnosis change, or new procedure performed?: [x] No    [] Yes (see summary sheet for update)  Subjective functional status/changes:   [] No changes reported  Pt reports that she has been feeling pretty good since last visit.      OBJECTIVE    Modality rationale: decrease pain and increase tissue extensibility to improve the patients ability to stand, walk, and sit with less pain    Min Type Additional Details       [] Estim: []Att   []Unatt    []TENS instruct                  []IFC  []Premod   []NMES                     []Other:  []w/US   []w/ice   []w/heat  Position:  Location:       []  Traction: [] Cervical       []Lumbar                       [] Prone          []Supine                       []Intermittent   []Continuous Lbs:  [] before manual  [] after manual  []w/heat    []  Ultrasound: []Continuous   [] Pulsed                       at: []1MHz   []3MHz Location:  W/cm2:    [] Paraffin         Location:   []w/heat   10 []  Ice     [x]  Heat  []  Ice massage Position: sitting  Location: lumbar and HOANG knees    []  Laser  []  Other: Position:  Location:      []  Vasopneumatic Device Pressure:       [] lo [] med [] hi   Temperature:      [x] Skin assessment post-treatment:  [x]intact []redness- no adverse reaction    []redness  adverse reaction:         45 min Therapeutic Exercise:  [x] See flow sheet :   Rationale: increase ROM, increase strength and improve coordination to improve the patients ability to stand, walk, and sit with less pain in her back and knees    With   [x] TE   [] TA   [] neuro   [] other: Patient Education: [x] Review HEP    [] Progressed/Changed HEP based on:   [] positioning   [] body mechanics   [] transfers   [] heat/ice application    [] other:      Other Objective/Functional Measures: Mod fatigue with today's standing exercises. Updated HEP    Pain Level (0-10 scale) post treatment: 1/10    ASSESSMENT/Changes in Function:     Patient will continue to benefit from skilled PT services to modify and progress therapeutic interventions, address functional mobility deficits, address ROM deficits, address strength deficits, analyze and cue movement patterns, analyze and modify body mechanics/ergonomics and assess and modify postural abnormalities to attain remaining goals. []  See Plan of Care  []  See progress note/recertification  []  See Discharge Summary         Progress towards goals / Updated goals:  Patient continues to show slow progress towards goals with continued LE weakness and decrease in lumbar mobility.      PLAN  [x]  Upgrade activities as tolerated     [x]  Continue plan of care  [x]  Update interventions per flow sheet       []  Discharge due to:_  []  Other:_      Sandie Garcia, PTA, OPTA, CPT  9/9/2020

## 2020-09-11 ENCOUNTER — HOSPITAL ENCOUNTER (OUTPATIENT)
Dept: PHYSICAL THERAPY | Age: 45
Discharge: HOME | End: 2020-09-11
Payer: COMMERCIAL

## 2020-09-11 PROCEDURE — 97110 THERAPEUTIC EXERCISES: CPT

## 2020-09-11 NOTE — PROGRESS NOTES
PT DAILY TREATMENT NOTE 2-15    Patient Name: Romeo Wang  Date:2020  : 1975  [x]  Patient  Verified  Payor: [address] / Plan: [address] / Product Type: Managed Care Medicaid /    In time: 7:05a  Out time: 8:00a  Total Treatment Time (min): 55  Visit #: 6    Treatment Area: Low back pain [M54.5]  Bilateral knee pain [M25.561, M25.562]    SUBJECTIVE  Pain Level (0-10 scale): 0/10  Any medication changes, allergies to medications, adverse drug reactions, diagnosis change, or new procedure performed?: [x] No    [] Yes (see summary sheet for update)  Subjective functional status/changes:   [] No changes reported  Patient reports she has no pain coming in today, but her knees continue to \"pop all the time. \" Patient reports no issues after last session.     OBJECTIVE    Modality rationale: decrease pain and increase tissue extensibility to improve the patients ability to stand, walk, and sit with less pain    Min Type Additional Details       [] Estim: []Att   []Unatt    []TENS instruct                  []IFC  []Premod   []NMES                     []Other:  []w/US   []w/ice   []w/heat  Position:  Location:       []  Traction: [] Cervical       []Lumbar                       [] Prone          []Supine                       []Intermittent   []Continuous Lbs:  [] before manual  [] after manual  []w/heat    []  Ultrasound: []Continuous   [] Pulsed                       at: []1MHz   []3MHz Location:  W/cm2:    [] Paraffin         Location:   []w/heat   10 []  Ice     [x]  Heat  []  Ice massage Position: sitting  Location: lumbar and HOANG knees    []  Laser  []  Other: Position:  Location:      []  Vasopneumatic Device Pressure:       [] lo [] med [] hi   Temperature:      [x] Skin assessment post-treatment:  [x]intact []redness- no adverse reaction    []redness  adverse reaction:         45 min Therapeutic Exercise:  [x] See flow sheet :   Rationale: increase ROM, increase strength and improve coordination to improve the patients ability to stand, walk, and sit with less pain in her back and knees    With   [x] TE   [] TA   [] neuro   [] other: Patient Education: [x] Review HEP    [] Progressed/Changed HEP based on:   [] positioning   [] body mechanics   [] transfers   [] heat/ice application    [] other:      Other Objective/Functional Measures: Mod fatigue with today's standing exercises. Pain Level (0-10 scale) post treatment: 1/10    ASSESSMENT/Changes in Function:     Patient will continue to benefit from skilled PT services to modify and progress therapeutic interventions, address functional mobility deficits, address ROM deficits, address strength deficits, analyze and cue movement patterns, analyze and modify body mechanics/ergonomics and assess and modify postural abnormalities to attain remaining goals. []  See Plan of Care  []  See progress note/recertification  []  See Discharge Summary         Progress towards goals / Updated goals:  Patient making slow progress towards goals with increased fatigue with standing interventions noted. Patient continues to demonstrates BLE weakness and required rest breaks due to fatigue.      PLAN  [x]  Upgrade activities as tolerated     [x]  Continue plan of care  [x]  Update interventions per flow sheet       []  Discharge due to:_  []  Other:_      Jaja Vasquez, PTA  9/11/2020

## 2020-09-14 ENCOUNTER — HOSPITAL ENCOUNTER (OUTPATIENT)
Dept: PHYSICAL THERAPY | Age: 45
Discharge: HOME | End: 2020-09-14
Payer: COMMERCIAL

## 2020-09-14 PROCEDURE — 97110 THERAPEUTIC EXERCISES: CPT | Performed by: PHYSICAL THERAPIST

## 2020-09-14 NOTE — PROGRESS NOTES
PT DAILY TREATMENT NOTE 2-15    Patient Name: Berna Parker  Date:2020  : 1975  [x]  Patient  Verified  Payor: [address] [place] [address] Road / Plan: [address] / Product Type: Managed Care Medicaid /    In time: 9:45 AM  Out time: 10:45 AM  Total Treatment Time (min): 60  Visit #: 7    Treatment Area: Low back pain [M54.5]  Bilateral knee pain [M25.561, M25.562]    SUBJECTIVE  Pain Level (0-10 scale): 1/10  Any medication changes, allergies to medications, adverse drug reactions, diagnosis change, or new procedure performed?: [x] No    [] Yes (see summary sheet for update)  Subjective functional status/changes:   [] No changes reported  Patient reports that she is doing well and that she has been compliant with her HEP.  Patient plans on [facility] only after her next visit    OBJECTIVE    Modality rationale: decrease pain and increase tissue extensibility to improve the patients ability to stand, walk, and sit with less pain    Min Type Additional Details       [] Estim: []Att   []Unatt    []TENS instruct                  []IFC  []Premod   []NMES                     []Other:  []w/US   []w/ice   []w/heat  Position:  Location:       []  Traction: [] Cervical       []Lumbar                       [] Prone          []Supine                       []Intermittent   []Continuous Lbs:  [] before manual  [] after manual  []w/heat    []  Ultrasound: []Continuous   [] Pulsed                       at: []1MHz   []3MHz Location:  W/cm2:    [] Paraffin         Location:   []w/heat   15, post []  Ice     [x]  Heat  []  Ice massage Position: sitting  Location: lumbar and HOANG knees    []  Laser  []  Other: Position:  Location:      []  Vasopneumatic Device Pressure:       [] lo [] med [] hi   Temperature:      [x] Skin assessment post-treatment:  [x]intact []redness- no adverse reaction    []redness  adverse reaction:         45 min Therapeutic Exercise:  [x] See flow sheet :   Rationale: increase ROM, increase strength and improve coordination to improve the patients ability to stand, walk, and sit with less pain           With   [x] TE   [] TA   [] neuro   [] other: Patient Education: [x] Review HEP    [] Progressed/Changed HEP based on:   [] positioning   [] body mechanics   [] transfers   [] heat/ice application    [] other:      Other Objective/Functional Measures: Patient tolerated standing exercise progression well; experienced less fatigue and no increase in pain during exercises today     Pain Level (0-10 scale) post treatment: 0/10    ASSESSMENT/Changes in Function:   Patient will continue to benefit from skilled PT services to modify and progress therapeutic interventions, address functional mobility deficits, address ROM deficits, address strength deficits, analyze and cue movement patterns, analyze and modify body mechanics/ergonomics and assess and modify postural abnormalities to attain remaining goals.      []  See Plan of Care  []  See progress note/recertification  []  See Discharge Summary         Progress towards goals / Updated goals:  Patient demonstrates progress towards short- and long-term goals    PLAN  [x]  Upgrade activities as tolerated     [x]  Continue plan of care  []  Update interventions per flow sheet       []  Discharge due to:_  []  Other:_      Gabby Chowdary, SPT 9/14/2020

## 2020-09-16 ENCOUNTER — HOSPITAL ENCOUNTER (OUTPATIENT)
Dept: PHYSICAL THERAPY | Age: 45
Discharge: HOME | End: 2020-09-16
Payer: COMMERCIAL

## 2020-09-16 PROCEDURE — 97110 THERAPEUTIC EXERCISES: CPT | Performed by: PHYSICAL THERAPIST

## 2020-09-16 NOTE — PROGRESS NOTES
PT DAILY TREATMENT NOTE 2-15    Patient Name: Aida Portillo  Date:2020  : 1975  [x]  Patient  Verified  Payor: [address] [place] [address] Road / Plan: [address] / Product Type: Managed Care Medicaid /    In time: 9:00 AM  Out time: 10:00 AM  Total Treatment Time (min): 60  Visit #:  8    Treatment Area: Low back pain [M54.5]  Bilateral knee pain [M25.561, M25.562]    SUBJECTIVE  Pain Level (0-10 scale): 0/10  Any medication changes, allergies to medications, adverse drug reactions, diagnosis change, or new procedure performed?: [x] No    [] Yes (see summary sheet for update)  Subjective functional status/changes:   [] No changes reported  Patient reports that she is doing well and that she has not been experiencing any pain. Patient wants to stick with her plan of discharge to [facility] only after today's visit.      OBJECTIVE    Modality rationale: decrease pain and increase tissue extensibility to improve the patients ability to stand, walk, and sit with less pain    Min Type Additional Details       [] Estim: []Att   []Unatt    []TENS instruct                  []IFC  []Premod   []NMES                     []Other:  []w/US   []w/ice   []w/heat  Position:  Location:       []  Traction: [] Cervical       []Lumbar                       [] Prone          []Supine                       []Intermittent   []Continuous Lbs:  [] before manual  [] after manual  []w/heat    []  Ultrasound: []Continuous   [] Pulsed                       at: []1MHz   []3MHz Location:  W/cm2:    [] Paraffin         Location:   []w/heat   15, post []  Ice     [x]  Heat  []  Ice massage Position: seated  Location: low back, HOANG knees    []  Laser  []  Other: Position:  Location:      []  Vasopneumatic Device Pressure:       [] lo [] med [] hi   Temperature:      [x] Skin assessment post-treatment:  [x]intact []redness- no adverse reaction    []redness  adverse reaction:         45 min Therapeutic Exercise:  [x] See flow sheet :   Rationale: increase ROM, increase strength and improve coordination to improve the patients ability to stand, walk, and sit with less pain           With   [x] TE   [] TA   [] neuro   [] other: Patient Education: [x] Review HEP    [] Progressed/Changed HEP based on:   [] positioning   [] body mechanics   [] transfers   [] heat/ice application    [] other:      Other Objective/Functional Measures: patient tolerated addition of ankle weights during standing exercises very well; overall improved activity tolerance and no increase in pain with exercises     Pain Level (0-10 scale) post treatment: 0/10    ASSESSMENT/Changes in Function:   Patient demonstrates improved strength, functional mobility, and activity tolerance as well as decreased pain. Patient to be discharged to HEP only. []  See Plan of Care  []  See progress note/recertification  [x]  See Discharge Summary         Progress towards goals / Updated goals:  Short Term Goals: To be accomplished in 4 weeks:  1. Pt will demonstrate understanding of and compliance with HEP - MET  2. Pt will transfer from supine on the floor to standing independently and with <4/10 pain in her hip and back - PROGRESSING: not painful, but still difficult    3. Pt will walk for 5 minutes with < 3/10 pain in her hip and back - MET  Long Term Goals: To be accomplished in 12 weeks:  1. Pt will report an ability to transfer supine on the floor to standing and to perform all forms of bed mobility without pain in her back or knees - PROGRESSING  2. Pt will be able to walk for 10 minutes with no pain in her back or knees - MET  3.  Pt will be able to  an object off of the floor with correct body mechanics and with no pain in her back or knees - PROGRESSING    PLAN  []  Upgrade activities as tolerated     []  Continue plan of care  []  Update interventions per flow sheet       [x]  Discharge due to: progress towards goals and compliance with HEP  []  Other:Fletcher Hinson Jerald, SPT 9/16/2020

## 2020-09-28 ENCOUNTER — HOSPITAL ENCOUNTER (EMERGENCY)
Age: 45
Discharge: HOME | End: 2020-09-28
Attending: STUDENT IN AN ORGANIZED HEALTH CARE EDUCATION/TRAINING PROGRAM
Payer: COMMERCIAL

## 2020-09-28 ENCOUNTER — APPOINTMENT (OUTPATIENT)
Dept: CT IMAGING | Age: 45
End: 2020-09-28
Attending: STUDENT IN AN ORGANIZED HEALTH CARE EDUCATION/TRAINING PROGRAM
Payer: COMMERCIAL

## 2020-09-28 VITALS
WEIGHT: 252.87 LBS | TEMPERATURE: 97.6 F | OXYGEN SATURATION: 98 % | BODY MASS INDEX: 43.17 KG/M2 | SYSTOLIC BLOOD PRESSURE: 101 MMHG | DIASTOLIC BLOOD PRESSURE: 60 MMHG | RESPIRATION RATE: 16 BRPM | HEIGHT: 64 IN | HEART RATE: 76 BPM

## 2020-09-28 DIAGNOSIS — N30.00 ACUTE CYSTITIS WITHOUT HEMATURIA: ICD-10-CM

## 2020-09-28 DIAGNOSIS — R10.815 PERIUMBILICAL ABDOMINAL TENDERNESS WITHOUT REBOUND TENDERNESS: ICD-10-CM

## 2020-09-28 DIAGNOSIS — H93.8X3 CONGESTION OF BOTH EARS: Primary | ICD-10-CM

## 2020-09-28 LAB
ALBUMIN SERPL-MCNC: 3.5 G/DL (ref 3.5–5)
ALBUMIN/GLOB SERPL: 0.8 {RATIO} (ref 1.1–2.2)
ALP SERPL-CCNC: 133 U/L (ref 45–117)
ALT SERPL-CCNC: 18 U/L (ref 12–78)
ANION GAP SERPL CALC-SCNC: 11 MMOL/L (ref 5–15)
APPEARANCE UR: CLEAR
AST SERPL-CCNC: 20 U/L (ref 15–37)
BACTERIA URNS QL MICRO: ABNORMAL /HPF
BASOPHILS # BLD: 0 K/UL (ref 0–0.1)
BASOPHILS NFR BLD: 1 % (ref 0–1)
BILIRUB SERPL-MCNC: 0.4 MG/DL (ref 0.2–1)
BILIRUB UR QL: NEGATIVE
BUN SERPL-MCNC: 10 MG/DL (ref 6–20)
BUN/CREAT SERPL: 10 (ref 12–20)
CALCIUM SERPL-MCNC: 9.3 MG/DL (ref 8.5–10.1)
CHLORIDE SERPL-SCNC: 103 MMOL/L (ref 97–108)
CO2 SERPL-SCNC: 27 MMOL/L (ref 21–32)
COLOR UR: ABNORMAL
COMMENT, HOLDF: NORMAL
CREAT SERPL-MCNC: 0.96 MG/DL (ref 0.55–1.02)
DIFFERENTIAL METHOD BLD: ABNORMAL
EOSINOPHIL # BLD: 0.1 K/UL (ref 0–0.4)
EOSINOPHIL NFR BLD: 2 % (ref 0–7)
EPITH CASTS URNS QL MICRO: ABNORMAL /LPF
ERYTHROCYTE [DISTWIDTH] IN BLOOD BY AUTOMATED COUNT: 18.4 % (ref 11.5–14.5)
GLOBULIN SER CALC-MCNC: 4.3 G/DL (ref 2–4)
GLUCOSE SERPL-MCNC: 89 MG/DL (ref 65–100)
GLUCOSE UR STRIP.AUTO-MCNC: NEGATIVE MG/DL
HCG UR QL: NEGATIVE
HCT VFR BLD AUTO: 33.5 % (ref 35–47)
HGB BLD-MCNC: 10.1 G/DL (ref 11.5–16)
HGB UR QL STRIP: NEGATIVE
IMM GRANULOCYTES # BLD AUTO: 0 K/UL (ref 0–0.04)
IMM GRANULOCYTES NFR BLD AUTO: 0 % (ref 0–0.5)
KETONES UR QL STRIP.AUTO: NEGATIVE MG/DL
LEUKOCYTE ESTERASE UR QL STRIP.AUTO: ABNORMAL
LIPASE SERPL-CCNC: 88 U/L (ref 73–393)
LYMPHOCYTES # BLD: 1.3 K/UL (ref 0.8–3.5)
LYMPHOCYTES NFR BLD: 20 % (ref 12–49)
MCH RBC QN AUTO: 25.7 PG (ref 26–34)
MCHC RBC AUTO-ENTMCNC: 30.1 G/DL (ref 30–36.5)
MCV RBC AUTO: 85.2 FL (ref 80–99)
MONOCYTES # BLD: 0.4 K/UL (ref 0–1)
MONOCYTES NFR BLD: 7 % (ref 5–13)
NEUTS SEG # BLD: 4.5 K/UL (ref 1.8–8)
NEUTS SEG NFR BLD: 70 % (ref 32–75)
NITRITE UR QL STRIP.AUTO: NEGATIVE
NRBC # BLD: 0 K/UL (ref 0–0.01)
NRBC BLD-RTO: 0 PER 100 WBC
PH UR STRIP: 6 [PH] (ref 5–8)
PLATELET # BLD AUTO: 285 K/UL (ref 150–400)
PMV BLD AUTO: 9.8 FL (ref 8.9–12.9)
POTASSIUM SERPL-SCNC: 3.9 MMOL/L (ref 3.5–5.1)
PROT SERPL-MCNC: 7.8 G/DL (ref 6.4–8.2)
PROT UR STRIP-MCNC: NEGATIVE MG/DL
RBC # BLD AUTO: 3.93 M/UL (ref 3.8–5.2)
RBC #/AREA URNS HPF: ABNORMAL /HPF (ref 0–5)
SAMPLES BEING HELD,HOLD: NORMAL
SODIUM SERPL-SCNC: 141 MMOL/L (ref 136–145)
SP GR UR REFRACTOMETRY: <1.005 (ref 1–1.03)
UR CULT HOLD, URHOLD: NORMAL
UROBILINOGEN UR QL STRIP.AUTO: 0.2 EU/DL (ref 0.2–1)
WBC # BLD AUTO: 6.4 K/UL (ref 3.6–11)
WBC URNS QL MICRO: ABNORMAL /HPF (ref 0–4)

## 2020-09-28 PROCEDURE — 80053 COMPREHEN METABOLIC PANEL: CPT

## 2020-09-28 PROCEDURE — 36415 COLL VENOUS BLD VENIPUNCTURE: CPT

## 2020-09-28 PROCEDURE — 83690 ASSAY OF LIPASE: CPT

## 2020-09-28 PROCEDURE — 81001 URINALYSIS AUTO W/SCOPE: CPT

## 2020-09-28 PROCEDURE — 74176 CT ABD & PELVIS W/O CONTRAST: CPT

## 2020-09-28 PROCEDURE — 99284 EMERGENCY DEPT VISIT MOD MDM: CPT

## 2020-09-28 PROCEDURE — 74011250636 HC RX REV CODE- 250/636: Performed by: STUDENT IN AN ORGANIZED HEALTH CARE EDUCATION/TRAINING PROGRAM

## 2020-09-28 PROCEDURE — 85025 COMPLETE CBC W/AUTO DIFF WBC: CPT

## 2020-09-28 PROCEDURE — 96374 THER/PROPH/DIAG INJ IV PUSH: CPT

## 2020-09-28 PROCEDURE — 81025 URINE PREGNANCY TEST: CPT

## 2020-09-28 RX ORDER — ONDANSETRON 2 MG/ML
4 INJECTION INTRAMUSCULAR; INTRAVENOUS
Status: COMPLETED | OUTPATIENT
Start: 2020-09-28 | End: 2020-09-28

## 2020-09-28 RX ORDER — FLUTICASONE PROPIONATE 50 MCG
2 SPRAY, SUSPENSION (ML) NASAL DAILY
Qty: 1 BOTTLE | Refills: 0 | Status: SHIPPED | OUTPATIENT
Start: 2020-09-28 | End: 2020-10-12

## 2020-09-28 RX ORDER — NITROFURANTOIN 25; 75 MG/1; MG/1
100 CAPSULE ORAL 2 TIMES DAILY
Qty: 6 CAP | Refills: 0 | Status: SHIPPED | OUTPATIENT
Start: 2020-09-28 | End: 2020-10-01

## 2020-09-28 RX ORDER — ONDANSETRON 4 MG/1
4 TABLET, ORALLY DISINTEGRATING ORAL
Qty: 12 TAB | Refills: 0 | Status: SHIPPED | OUTPATIENT
Start: 2020-09-28 | End: 2020-10-02

## 2020-09-28 RX ADMIN — ONDANSETRON 4 MG: 2 INJECTION INTRAMUSCULAR; INTRAVENOUS at 15:11

## 2020-09-28 NOTE — ED TRIAGE NOTES
Pt reports periumbilical pain with bilateral ear pain that started 4 days ago. Pt reports intermittent nausea. NO fevers, vomiting, diarrhea.

## 2020-09-28 NOTE — ED NOTES
The patient was discharged home by Dr. Heber Villar in stable condition. The patient is alert and oriented, in no respiratory distress and discharge vital signs obtained. The patient's diagnosis, condition and treatment were explained. The patient expressed understanding. Three prescriptions e-scribed to pharmacy. No work/school note given. A discharge plan has been developed. A  was not involved in the process. Aftercare instructions were given. Pt ambulatory out of the ED.

## 2020-09-29 NOTE — ED PROVIDER NOTES
Patient is a 41-year-old female presenting today with both ear pain and abdominal pain. She reports 4 days of symptoms. Reports bilateral ear pressure without drainage. She did not injure her ears or put anything in her ears. No nasal congestion, fevers, sore throat or difficulty swallowing. She also reports periumbilical abdominal discomfort that is worse with movement. She has had nausea but no vomiting or diarrhea. No fevers or chills. No urinary symptoms or vaginal bleeding. Denies any other complaints at this time.            Past Medical History:   Diagnosis Date    DDD (degenerative disc disease), lumbosacral     Migraine     Osteoarthritis     Renal stones     Sciatica     Tubular adenoma of colon 2020       Past Surgical History:   Procedure Laterality Date    BREAST SURGERY PROCEDURE UNLISTED      right breast biopsy - benign    COLONOSCOPY N/A 2020    [facility] HX  SECTION  ,     HX TONSILLECTOMY      HX TUBAL LIGATION      HX WISDOM TEETH EXTRACTION           Family History:   Problem Relation Age of Onset    Gout Mother     Arthritis-rheumatoid Father     No Known Problems Sister     Gout Brother     No Known Problems Other     No Known Problems Son        Social History     Socioeconomic History    Marital status:      Spouse name: Not on file    Number of children: Not on file    Years of education: Not on file    Highest education level: Not on file   Occupational History    Not on file   Social Needs    Financial resource strain: Not on file    Food insecurity     Worry: Not on file     Inability: Not on file    Transportation needs     Medical: Not on file     Non-medical: Not on file   Tobacco Use    Smoking status: Former Smoker     Packs/day: 0.50     Last attempt to quit: 2014     Years since quittin.8    Smokeless tobacco: Never Used   Substance and Sexual Activity    Alcohol use: Not Currently     Alcohol/week: 3.3 standard drinks     Types: 4 Standard drinks or equivalent per week    Drug use: No    Sexual activity: Not Currently     Partners: Male   Lifestyle    Physical activity     Days per week: Not on file     Minutes per session: Not on file    Stress: Not on file   Relationships    Social connections     Talks on phone: Not on file     Gets together: Not on file     Attends Gnosticism service: Not on file     Active member of club or organization: Not on file     Attends meetings of clubs or organizations: Not on file     Relationship status: Not on file    Intimate partner violence     Fear of current or ex partner: Not on file     Emotionally abused: Not on file     Physically abused: Not on file     Forced sexual activity: Not on file   Other Topics Concern    Not on file   Social History Narrative    Not on file         ALLERGIES: Sulfa (sulfonamide antibiotics)    Review of Systems   Constitutional: Negative for chills and fever. HENT: Positive for ear pain. Negative for congestion and rhinorrhea. Eyes: Negative for redness and visual disturbance. Respiratory: Negative for cough and shortness of breath. Cardiovascular: Negative for chest pain and leg swelling. Gastrointestinal: Positive for abdominal pain and nausea. Negative for diarrhea and vomiting. Genitourinary: Negative for dysuria, flank pain, frequency, hematuria and urgency. Musculoskeletal: Negative for arthralgias, back pain, myalgias and neck pain. Skin: Negative for rash and wound. Allergic/Immunologic: Negative for immunocompromised state. Neurological: Negative for dizziness and headaches.        Vitals:    09/28/20 1432 09/28/20 1600   BP: 110/79 101/60   Pulse: 87 76   Resp: 16 16   Temp: 97.6 °F (36.4 °C)    SpO2: 99% 98%   Weight: 114.7 kg (252 lb 13.9 oz)    Height: 5' 4\" (1.626 m)             Physical Exam  Vitals signs and nursing note reviewed. Constitutional:       General: She is not in acute distress. Appearance: She is well-developed. She is not diaphoretic. HENT:      Head: Normocephalic. Ears:      Comments: Effusion present behind bilateral TMs without erythema  External auditory canals are normal  No evidence of mastoiditis       Mouth/Throat:      Pharynx: No oropharyngeal exudate. Eyes:      General:         Right eye: No discharge. Left eye: No discharge. Pupils: Pupils are equal, round, and reactive to light. Neck:      Musculoskeletal: Normal range of motion and neck supple. Cardiovascular:      Rate and Rhythm: Normal rate and regular rhythm. Heart sounds: Normal heart sounds. No murmur. No friction rub. No gallop. Pulmonary:      Effort: Pulmonary effort is normal. No respiratory distress. Breath sounds: Normal breath sounds. No stridor. No wheezing or rales. Abdominal:      General: Bowel sounds are normal. There is no distension. Palpations: Abdomen is soft. Tenderness: There is abdominal tenderness in the right lower quadrant and periumbilical area. There is no guarding or rebound. Musculoskeletal: Normal range of motion. General: No deformity. Skin:     General: Skin is warm and dry. Capillary Refill: Capillary refill takes less than 2 seconds. Findings: No rash. Neurological:      Mental Status: She is alert and oriented to person, place, and time. Psychiatric:         Behavior: Behavior normal.            Labs Reviewed:   No leukocytosis  Chronic appearing anemia  Normal renal function and electrolytes  UA with white cells, bacteria and leukesterase  Lipase not consistent with pancreatitis    Imaging Reviewed:   CT of the abdomen shows no acute process      Course:  Zofran given IV        MDM:  41-year-old female presenting today with abdominal pain as well as bilateral ear pain.   I suspect the ear pain is related to congestion as there are bilateral effusions but no evidence of otitis media. Will give Flonase for this. Additionally she has abdominal pain for the past several days with associated nausea. She did have some umbilical as well as right lower quadrant tenderness so a CT was ordered to rule out pathology such as diverticulitis appendicitis and intra-abdominal abscess. No evidence of these on CAT scan. Lab work not consistent with pancreatitis, bili obstructive process or hepatitis. UA exhibits questionable UTI so we will treat with Macrobid for 3 days. Strict return precautions were provided and she was discharged home. Clinical Impression:     ICD-10-CM ICD-9-CM    1. Congestion of both ears  H93.8X3 388.8    2. Acute cystitis without hematuria  N30.00 595.0    3.  Periumbilical abdominal tenderness without rebound tenderness  R10.815 789.65            Disposition: MOI Thompson, DO

## 2020-10-27 ENCOUNTER — OFFICE VISIT (OUTPATIENT)
Dept: RHEUMATOLOGY | Age: 45
End: 2020-10-27
Payer: COMMERCIAL

## 2020-10-27 VITALS
TEMPERATURE: 97.5 F | SYSTOLIC BLOOD PRESSURE: 109 MMHG | HEART RATE: 70 BPM | DIASTOLIC BLOOD PRESSURE: 60 MMHG | WEIGHT: 248 LBS | BODY MASS INDEX: 42.57 KG/M2 | RESPIRATION RATE: 18 BRPM

## 2020-10-27 DIAGNOSIS — M22.2X1 PATELLOFEMORAL ARTHRALGIA OF BOTH KNEES: ICD-10-CM

## 2020-10-27 DIAGNOSIS — Z79.60 LONG-TERM USE OF IMMUNOSUPPRESSANT MEDICATION: ICD-10-CM

## 2020-10-27 DIAGNOSIS — E55.9 VITAMIN D DEFICIENCY: ICD-10-CM

## 2020-10-27 DIAGNOSIS — M22.2X2 PATELLOFEMORAL ARTHRALGIA OF BOTH KNEES: ICD-10-CM

## 2020-10-27 DIAGNOSIS — M54.9 CHRONIC BACK PAIN GREATER THAN 3 MONTHS DURATION: ICD-10-CM

## 2020-10-27 DIAGNOSIS — M06.4 UNDIFFERENTIATED INFLAMMATORY ARTHRITIS (HCC): Primary | ICD-10-CM

## 2020-10-27 DIAGNOSIS — G89.29 CHRONIC BACK PAIN GREATER THAN 3 MONTHS DURATION: ICD-10-CM

## 2020-10-27 DIAGNOSIS — M79.7 SECONDARY FIBROMYALGIA: ICD-10-CM

## 2020-10-27 PROCEDURE — 99215 OFFICE O/P EST HI 40 MIN: CPT | Performed by: INTERNAL MEDICINE

## 2020-10-27 RX ORDER — FLUTICASONE PROPIONATE 50 MCG
2 SPRAY, SUSPENSION (ML) NASAL DAILY
COMMUNITY
End: 2021-10-29 | Stop reason: ALTCHOICE

## 2020-10-27 RX ORDER — ERGOCALCIFEROL 1.25 MG/1
50000 CAPSULE ORAL
Qty: 12 CAP | Refills: 4 | Status: SHIPPED | OUTPATIENT
Start: 2020-10-27 | End: 2021-12-15

## 2020-10-27 RX ORDER — FOLIC ACID 1 MG/1
1 TABLET ORAL DAILY
Qty: 90 TAB | Refills: 5 | Status: SHIPPED | OUTPATIENT
Start: 2020-10-27 | End: 2021-10-29 | Stop reason: SDUPTHER

## 2020-10-27 RX ORDER — METHOTREXATE 2.5 MG/1
20 TABLET ORAL
Qty: 96 TAB | Refills: 1 | Status: SHIPPED | OUTPATIENT
Start: 2020-10-27 | End: 2021-03-03 | Stop reason: SDUPTHER

## 2020-10-27 NOTE — LETTER
10/27/20 Patient: Kameron Segovia YOB: 1975 Date of Visit: 10/27/2020 Krysta Montesinos MD 
[address] [place] [address] [id number] VIA In Basket Dear Krysta Montesinos MD, Thank you for referring Ms. Bro Mejia to [address] [place] [address] for evaluation. My notes for this consultation are attached. If you have questions, please do not hesitate to call me. I look forward to following your patient along with you.  
 
 
Sincerely, 
 
Magda Parmar MD

## 2020-10-27 NOTE — PROGRESS NOTES
REASON FOR VISIT    This is a follow-up visit for Ms. Medeiros for     ICD-10-CM   1. Undifferentiated inflammatory arthritis (HCC)  M06.4     Inflammatory arthritis phenotype includes:  Anti-CCP positive: no  Rheumatoid factor positive: no  HLA-B27 positive: no  Erosive disease: N/A  Extra-articular manifestations include: secondary fibromyalgia     Immunosuppression Screening (7/31/2020):  Quantiferon TB: negative  PPD:  Not performed  Hepatitis B: negative  Hepatitis C: negative    Therapy History includes:  Current DMARD therapy include: methotrexate 15 mg every Tuesday (8/11/2020 to present)  Prior DMARD therapy include: none  Discontinued DMARDs because of inefficacy: None  Discontinued DMARDs because of side effects: None    HISTORY OF PRESENT ILLNESS    Ms. Loralee Aschoff returns for a follow-up. On her last visit, I suspected a possible underlying inflammatory process, os I prescribed her a prednisone taper as a trial and asked her to contact me when she started the second week. She did and reported that it helped her hands and wrists, so I started methotrexate 15 mg every Tuesday, which she had taken with good tolerance. I had referred her to physical therapy for her back. Today, she feels better. She has intermittent pain once in a while but not as intense. Physical therapy helped her knees and back. She denies pain, swelling, or stiffness in her hands like before. She no longer has pain in her elbow or difficulty straightening it out. If it is cold, she may have pain in her hands.      She denies fever, weight loss, blurred vision, vision loss, oral ulcers, ankle swelling, dry cough, dyspnea, nausea, vomiting, dysphagia, abdominal pain, black or bloody stool, fall since last visit, rash, easy bruising and increased thirst.    Last toxicity monitoring by blood work was done on 9/28/2020 and did not reveal any significant adverse effects, except Hct 33.5%,     Most recent inflammatory markers from 7/31/2020 revealed a ESR 68 mm/hr and CRP 33 mg/L. The patient has not had any interval hospital admissions, infections, or surgeries. She has sinus congestion. REVIEW OF SYSTEMS    A comprehensive review of systems was performed and pertinent results are documented in the HPI, review of systems is otherwise non-contributory. PAST MEDICAL HISTORY    She has a past medical history of DDD (degenerative disc disease), lumbosacral, Migraine, Osteoarthritis, Renal stones, Sciatica, and Tubular adenoma of colon (6/23/2020). FAMILY HISTORY    Her family history includes Arthritis-rheumatoid in her father; Gout in her brother and mother; No Known Problems in her sister, son, and another family member. SOCIAL HISTORY    She reports that she quit smoking about 5 years ago. She smoked 0.50 packs per day. She has never used smokeless tobacco. She reports previous alcohol use of about 3.3 standard drinks of alcohol per week. She reports that she does not use drugs. MEDICATIONS    Current Outpatient Medications   Medication Sig Dispense Refill    methotrexate (RHEUMATREX) 2.5 mg tablet Take 8 Tabs by mouth every Tuesday. 96 Tab 1    folic acid (FOLVITE) 1 mg tablet Take 1 Tab by mouth daily. 90 Tab 5    fluticasone propionate (Flonase Allergy Relief) 50 mcg/actuation nasal spray 2 Sprays by Both Nostrils route daily.  ergocalciferol (ERGOCALCIFEROL) 1,250 mcg (50,000 unit) capsule Take 1 Cap by mouth every seven (7) days. Indications: vitamin D deficiency (high dose therapy) 12 Cap 4    polyethylene glycol (Miralax) 17 gram/dose powder Take 17 g by mouth daily.  pantoprazole (PROTONIX) 20 mg tablet Take 20 mg by mouth daily.           ALLERGIES    Allergies   Allergen Reactions    Sulfa (Sulfonamide Antibiotics) Rash       PHYSICAL EXAMINATION    Visit Vitals  /60   Pulse 70   Temp 97.5 °F (36.4 °C)   Resp 18   Wt 248 lb (112.5 kg)   BMI 42.57 kg/m²     Body mass index is 42.57 kg/m². General: Patient is alert, oriented x 3, not in acute distress    HEENT:   Sclerae are not injected and appear moist.  There is no alopecia. Neck is supple     HEENT:   Conjunctiva are not injected and appear moist, there is no alopecia. Cardiovascular:  Heart is regular rate and rhythm, no murmurs. Chest:  Lungs are clear to auscultation bilaterally. Extremities:  Free of clubbing, cyanosis, edema, extremities well perfused. Neurological exam:  Muscle strength is full in upper and lower extremities. Skin exam:  There are no rashes, no tophi, no psoriasis, no active Raynaud's, no livedo reticularis, no periungual erythema. Musculoskeletal exam:  A comprehensive musculoskeletal exam was performed for all joints of each upper and lower extremity and assessed for swelling, tenderness and range of motion.  Positive results are documented as below:    18/18 tender points (RESOLVED)  No interphalangeal tenderness  Bilateral patellar laxity with tenderness    Z-Deformities:   no  Storden Neck Deformities:  no  Boutonierre's Deformities:  no  Ulnar Deviation:   no  MCP Subluxation:  no    Joint Count 10/27/2020 7/31/2020   Patient pain (0-100) 10 60   MHAQ 0.5 0.875   Left wrist- Tender 0 1   Left wrist- Swollen 0 0   Left 1st MCP - Tender - 1   Left 1st MCP - Swollen - 0   Left 2nd PIP - Tender - 1   Left 2nd PIP - Swollen - 1   Left 3rd PIP - Tender - 1   Left 3rd PIP - Swollen - 1   Left 4th PIP - Tender - 1   Left 4th PIP - Swollen - 0   Left 5th PIP - Tender - 1   Left 5th PIP - Swollen - 0   Right wrist- Tender - 1   Right wrist- Swollen - 1   Right 1st MCP - Tender - 1   Right 1st MCP - Swollen - 1   Right 3rd MCP - Tender - 1   Right 3rd MCP - Swollen - 1   Right 4th MCP - Tender - 1   Right 4th MCP - Swollen - 0   Right 5th MCP - Tender - 1   Right 5th MCP - Swollen - 0   Right 2nd PIP - Tender - 1   Right 2nd PIP - Swollen - 1   Right 3rd PIP - Tender - 1   Right 3rd PIP - Swollen - 1 Right 4th PIP - Tender - 1   Right 4th PIP - Swollen - 1   Tender Joint Count (Total) 0 14   Swollen Joint Count (Total) 0 8   Physician Assessment (0-10) 0 3   Patient Assessment (0-10) 1 8   CDAI Total (calculated) 1 33       DATA REVIEW    Laboratory     Recent laboratory results were reviewed, summarized, and discussed with the patient. Imaging    Musculoskeletal Ultrasound    None    Radiographs    Bilateral Knee 6/16/2013: overall alignment is normal. The joint spaces are preserved and only minimal arthritic changes are present, with tiny osteophytes present at the margins of the femorotibial compartments bilaterally. There is no evidence of joint effusion, erosions, or other features to suggest inflammatory arthritis. Bilateral hand radiograph showed Alignment and bone mineralization is normal bilaterally. No significant arthritic changes are present. CT Imaging    CT Abdomen and PElvis without contrast 9/28/2020: LOWER THORAX: No significant abnormality in the incidentally imaged lower chest. LIVER: No mass. BILIARY TREE: Gallbladder is within normal limits. CBD is not dilated. SPLEEN: within normal limits. PANCREAS: No focal abnormality. ADRENALS: Unremarkable. KIDNEYS/URETERS: Small nonobstructing bilateral renal stones. STOMACH: Unremarkable. SMALL BOWEL: No dilatation or wall thickening. COLON: No dilatation or wall thickening. APPENDIX: Unremarkable. PERITONEUM: No ascites or pneumoperitoneum. RETROPERITONEUM: Multiple prominent lymph nodes in the upper abdomen including gastrohepatic ligament, periportal space, and periaortic marylu chains without significant change. REPRODUCTIVE ORGANS: Unremarkable URINARY BLADDER: No mass or calculus. BONES: No destructive bone lesion. ABDOMINAL WALL: No mass or hernia. MR Imaging    MRI Lumbar Spine without contrast 5/12/2014: Alignment: There are 5 nonrib-bearing lumbar type vertebra. There is a normal lumbar lordosis without listhesis.  Intervertebral discs: There is L2 and L5 intervertebral disc desiccation with mild height loss at L2 and moderate height loss at the L5 level with mild bulging at L2 and more moderate bulging, anterior extrusion, and dorsal disc protrusion of the L5 disc level. Vertebral bodies/ marrow: There is endplate Modic type II signal demonstrated at the T12, L2 and L5 levels. A small Schmorl's node is seen at the anterior, superior L1 vertebral level. There is no marrow signal to indicate a marrow replacement process or fracture. Conus and cauda equina: The conus terminates at the T12 level and demonstrates normal signal characteristics. The nerve roots of the cauda equina are normal in appearance. Paravertebral soft tissues: There is no paraspinal mass or fluid collection identified. By level: L1-L2: No canal or neural foraminal stenosis. L2-L3: Mild concentric disc bulge canal or neural foraminal stenosis. L3-L4: There is mild facet osteoarthritis without canal or neural foraminal stenosis. L4-L5: There is a left foraminal disc protrusion without canal or neural foraminal stenosis. Mild facet osteoarthritis is also noted. L5-S1: There is a broad-based left paracentral disc protrusion which is not associated with canal or neural foraminal stenosis. MRI Lumbar Spine without contrast 6/01/2011: Lumbar spine alignment is within normal limits. Chronic Schmorl's node formation in the superior endplates of L1 and L3 and in the inferior  endplate of L5. No compression deformity. Mild multilevel disc desiccation. Conus medullaris terminates at T12-L1. Signal and caliber of the distal cord and conus are within normal limits. Paraspinal soft tissues are within normal limits. T12-L1: facet arthrosis. No herniation or stenosis. L1-L2: Facet arthrosis. No herniation or stenosis. L2-L3: Diffuse disc bulge, facet arthrosis, and thickened ligamentum flavum. Mild central spinal canal stenosis.  L3-L4: Diffuse disc bulge, facet arthrosis, and thickened ligamentum flavum. No stenosis. L4-L5: Diffuse disc bulge, facet arthrosis, and thickened ligamentum flavum. No stenosis. L5-S1: Diffuse disc bulge, facet arthrosis, and thickened ligamentum flavum. Mild left foraminal stenosis. DXA     None    ASSESSMENT AND PLAN    This is a follow-up visit for Ms. Medeiros. 1) Undifferentiated Inflammatory Arthritis. Her history is not consistent with an inflammatory process, however, her examination revealed tender and swollen joints, suggestive for synovitis.      She has been on methotrexate 15 mg every Tuesday with good tolerance and improvement. She no longer has her inflammatory hand or elbow symptoms/flares like before. Her CDAI was 1 (previously 33) with 0 tender and 0 swollen joints, consistent with remission. I will increase methotrexate to 20 mg weekly. Refill sent.      I ordered labs to be done end of 11/2020.     2) Secondary Fibromyalgia. Her history, constellation of symptoms, and examination are consistent with a pain syndrome. This resolved with treatment for her underlying inflammatory arthritis.    3) Long Term Use of Immunosuppressants. The patient remains on immunomodulatory medications (methotrexate) and requires frequent toxicity monitoring by blood work. Respective labs were ordered (CBC and CMP). 4) Patellofemoral Arthralgia. I had referred the patient to physical therapy which helped her pain.    5) Vitamin D Deficiency. The patient's vitamin D level was 15.4. I prescribed weekly ergocalciferol 50,000.    5) Chronic Back Pain. I had referred the patient to physical therapy which helped her pain. The patient voiced understanding of the aforementioned assessment and plan. Summary of plan was provided in the After Visit Summary patient instructions. I also provided education about [employer] setup and utility.     TODAY'S ORDERS    Orders Placed This Encounter    METABOLIC PANEL, COMPREHENSIVE    CBC WITH AUTOMATED DIFF    C REACTIVE PROTEIN, QT    SED RATE (ESR)    methotrexate (RHEUMATREX) 2.5 mg tablet    folic acid (FOLVITE) 1 mg tablet    ergocalciferol (ERGOCALCIFEROL) 1,250 mcg (50,000 unit) capsule     Future Appointments   Date Time Provider Sonia Ingris   12/28/2020  8:20 AM Alphonso Riojas MD [facility] BS AMB   1/19/2021 10:00 AM [facility] CT 1 SFMRCT [facility] Severa Fontan, MD, [address] [place] [address]    Adult Rheumatology   Rheumatology Ultrasound Certified  [facility]  A Part of [facility], [address] [facility], [address]   Phone [phone]  Fax [phone]

## 2020-11-28 LAB
ALBUMIN SERPL-MCNC: 4.1 G/DL (ref 3.8–4.8)
ALBUMIN/GLOB SERPL: 1.5 {RATIO} (ref 1.2–2.2)
ALP SERPL-CCNC: 130 IU/L (ref 39–117)
ALT SERPL-CCNC: 11 IU/L (ref 0–32)
AST SERPL-CCNC: 23 IU/L (ref 0–40)
BASOPHILS # BLD AUTO: 0 X10E3/UL (ref 0–0.2)
BASOPHILS NFR BLD AUTO: 0 %
BILIRUB SERPL-MCNC: 0.4 MG/DL (ref 0–1.2)
BUN SERPL-MCNC: 8 MG/DL (ref 6–24)
BUN/CREAT SERPL: 10 (ref 9–23)
CALCIUM SERPL-MCNC: 9.5 MG/DL (ref 8.7–10.2)
CHLORIDE SERPL-SCNC: 102 MMOL/L (ref 96–106)
CO2 SERPL-SCNC: 22 MMOL/L (ref 20–29)
CREAT SERPL-MCNC: 0.79 MG/DL (ref 0.57–1)
CRP SERPL-MCNC: 35 MG/L (ref 0–10)
EOSINOPHIL # BLD AUTO: 0.1 X10E3/UL (ref 0–0.4)
EOSINOPHIL NFR BLD AUTO: 2 %
ERYTHROCYTE [DISTWIDTH] IN BLOOD BY AUTOMATED COUNT: 17.5 % (ref 11.7–15.4)
ERYTHROCYTE [SEDIMENTATION RATE] IN BLOOD BY WESTERGREN METHOD: 21 MM/HR (ref 0–32)
GLOBULIN SER CALC-MCNC: 2.8 G/DL (ref 1.5–4.5)
GLUCOSE SERPL-MCNC: 138 MG/DL (ref 65–99)
HCT VFR BLD AUTO: 32.2 % (ref 34–46.6)
HGB BLD-MCNC: 10 G/DL (ref 11.1–15.9)
IMM GRANULOCYTES # BLD AUTO: 0 X10E3/UL (ref 0–0.1)
IMM GRANULOCYTES NFR BLD AUTO: 0 %
LYMPHOCYTES # BLD AUTO: 1 X10E3/UL (ref 0.7–3.1)
LYMPHOCYTES NFR BLD AUTO: 18 %
MCH RBC QN AUTO: 26.9 PG (ref 26.6–33)
MCHC RBC AUTO-ENTMCNC: 31.1 G/DL (ref 31.5–35.7)
MCV RBC AUTO: 87 FL (ref 79–97)
MONOCYTES # BLD AUTO: 0.1 X10E3/UL (ref 0.1–0.9)
MONOCYTES NFR BLD AUTO: 2 %
NEUTROPHILS # BLD AUTO: 4.3 X10E3/UL (ref 1.4–7)
NEUTROPHILS NFR BLD AUTO: 78 %
PLATELET # BLD AUTO: 278 X10E3/UL (ref 150–450)
POTASSIUM SERPL-SCNC: 4.4 MMOL/L (ref 3.5–5.2)
PROT SERPL-MCNC: 6.9 G/DL (ref 6–8.5)
RBC # BLD AUTO: 3.72 X10E6/UL (ref 3.77–5.28)
SODIUM SERPL-SCNC: 139 MMOL/L (ref 134–144)
WBC # BLD AUTO: 5.6 X10E3/UL (ref 3.4–10.8)

## 2020-11-30 NOTE — PROGRESS NOTES
The results were reviewed. Stable anemia (Hct 32.2%) with elevated inflammatory marker (, CRP 35 mg/L). normalization of inflammatory marker (ESR). Remaining labs are good.

## 2020-12-14 NOTE — PROGRESS NOTES
Physical Therapy at [place] [address],   a part of  [facility]  [facility]  Jhon Wilde  Phone: [phone]  Fax: [phone]    Discharge Summary  2-15    Patient name: Diya Coughlin  : 1975  Provider#: [id number]  Referral source: Deniz Pang MD      Medical/Treatment Diagnosis: Low back pain [M54.5]  Bilateral knee pain [M25.561, M25.562]     Prior Hospitalization: see medical history     Comorbidities: See Plan of Care  Prior Level of Function:See Plan of Care  Medications: Verified on Patient Summary List    Start of Care: 2020      Onset Date:   Visits from Start of Care: 8     Missed Visits: 0  Reporting Period : 2020 to 2020      ASSESSMENT/SUMMARY OF CARE: Ms. Anastasia Blackmon was treated for 8 PT visits with a focus on therapeutic exercises to build leg strength and core stability. She achieved all short term goals and opted to continue PT at home with her HEP. She has not needed to return to therapy since her last visit and will now be discharged. Progress towards goals / Updated goals:  Short Term Goals: To be accomplished in 4 weeks:  1. Pt will demonstrate understanding of and compliance with HEP - MET  2. Pt will transfer from supine on the floor to standing independently and with <4/10 pain in her hip and back - PROGRESSING: not painful, but still difficult    3. Pt will walk for 5 minutes with < 3/10 pain in her hip and back - MET  Long Term Goals: To be accomplished in 12 weeks:  1. Pt will report an ability to transfer supine on the floor to standing and to perform all forms of bed mobility without pain in her back or knees - PROGRESSING  2.  Pt will be able to walk for 10 minutes with no pain in her back or knees - MET  3. Pt will be able to  an object off of the floor with correct body mechanics and with no pain in her back or knees - PROGRESSING         RECOMMENDATIONS:  [x]Discontinue therapy: [x]Patient has reached or is progressing toward set goals      []Patient is non-compliant or has abdicated      []Due to lack of appreciable progress towards set goals      []Other    Ubaldo Christensen, PT 12/14/2020

## 2020-12-28 ENCOUNTER — VIRTUAL VISIT (OUTPATIENT)
Dept: RHEUMATOLOGY | Age: 45
End: 2020-12-28
Payer: COMMERCIAL

## 2020-12-28 DIAGNOSIS — Z79.60 LONG-TERM USE OF IMMUNOSUPPRESSANT MEDICATION: ICD-10-CM

## 2020-12-28 DIAGNOSIS — M22.2X1 PATELLOFEMORAL ARTHRALGIA OF BOTH KNEES: ICD-10-CM

## 2020-12-28 DIAGNOSIS — E55.9 VITAMIN D DEFICIENCY: ICD-10-CM

## 2020-12-28 DIAGNOSIS — M06.4 UNDIFFERENTIATED INFLAMMATORY ARTHRITIS (HCC): Primary | ICD-10-CM

## 2020-12-28 DIAGNOSIS — M22.2X2 PATELLOFEMORAL ARTHRALGIA OF BOTH KNEES: ICD-10-CM

## 2020-12-28 PROCEDURE — 99215 OFFICE O/P EST HI 40 MIN: CPT | Performed by: INTERNAL MEDICINE

## 2020-12-28 NOTE — PROGRESS NOTES
REASON FOR VISIT    This is a follow-up visit for Ms. Medeiros for     ICD-10-CM   1. Undifferentiated inflammatory arthritis ([facility]  M06.4     The patient has consented for synchronous (real-time) Telemedicine (audio-video technology) on 12/28/2020 for their care to be delivered over telemedicine in place of their regularly scheduled office visit pursuant to the emergency declaration under the [address] [place] [address] [facility] authority and the [employer] Act, this Virtual  Visit was conducted, with patient's consent, to reduce the patient's risk of exposure to COVID-19 and provide continuity of care for an established patient. Services were provided through a video synchronous discussion virtually to substitute for in-person clinic visit. Inflammatory arthritis phenotype includes:  Anti-CCP positive: no  Rheumatoid factor positive: no  HLA-B27 positive: no  Erosive disease: N/A  Extra-articular manifestations include: secondary fibromyalgia     Immunosuppression Screening (7/31/2020):  Quantiferon TB: negative  PPD:  Not performed  Hepatitis B: negative  Hepatitis C: negative    Therapy History includes:  Current DMARD therapy include: methotrexate 20 mg every Tuesday (10/27/2020 to present)  Prior DMARD therapy include: methotrexate 15 mg every Tuesday (8/11/2020 to 10/27/2020)  Discontinued DMARDs because of inefficacy: None  Discontinued DMARDs because of side effects: None    HISTORY OF PRESENT ILLNESS    Ms. Bey Husbands returns for a follow-up. On her last visit, I increased her methotrexate from 15 mg to 20 mg every Tuesday, which she has taken with good tolerance. Today, she feels well. She denies pain, swelling, or stiffness in her hands or knees. But due to the cold, she feels some pain in her hands and knees that is a dull aching and intermittent and feels more pain in her knees if she gets up and moves around.  If she rests, she has no knee pain. She continues to do her home exercises for patellofemoral arthralgia. She denies fever, weight loss, blurred vision, vision loss, oral ulcers, ankle swelling, dry cough, dyspnea, nausea, vomiting, dysphagia, abdominal pain, black or bloody stool, fall since last visit, rash, easy bruising and increased thirst.    Last toxicity monitoring by blood work was done on 11/27/2020 and did not reveal any significant adverse effects, except Hct 32.2%, . Most recent inflammatory markers from 11/27/2020 revealed a ESR 21 mm/hr and CRP 35 mg/L. The patient has not had any interval hospital admissions, infections, or surgeries. REVIEW OF SYSTEMS    A comprehensive review of systems was performed and pertinent results are documented in the HPI, review of systems is otherwise non-contributory. PAST MEDICAL HISTORY    She has a past medical history of DDD (degenerative disc disease), lumbosacral, Migraine, Osteoarthritis, Renal stones, Sciatica, and Tubular adenoma of colon (6/23/2020). FAMILY HISTORY    Her family history includes Arthritis-rheumatoid in her father; Gout in her brother and mother; No Known Problems in her sister, son, and another family member. SOCIAL HISTORY    She reports that she quit smoking about 6 years ago. She smoked 0.50 packs per day. She has never used smokeless tobacco. She reports previous alcohol use of about 3.3 standard drinks of alcohol per week. She reports that she does not use drugs. MEDICATIONS    Current Outpatient Medications   Medication Sig    methotrexate (RHEUMATREX) 2.5 mg tablet Take 8 Tabs by mouth every Tuesday.  folic acid (FOLVITE) 1 mg tablet Take 1 Tab by mouth daily.  fluticasone propionate (Flonase Allergy Relief) 50 mcg/actuation nasal spray 2 Sprays by Both Nostrils route daily.  ergocalciferol (ERGOCALCIFEROL) 1,250 mcg (50,000 unit) capsule Take 1 Cap by mouth every seven (7) days.  Indications: vitamin D deficiency (high dose therapy)    pantoprazole (PROTONIX) 20 mg tablet Take 20 mg by mouth daily.  polyethylene glycol (Miralax) 17 gram/dose powder Take 17 g by mouth daily. No current facility-administered medications for this visit. ALLERGIES    Allergies   Allergen Reactions    Sulfa (Sulfonamide Antibiotics) Rash       PHYSICAL EXAMINATION    There were no vitals taken for this visit. There is no height or weight on file to calculate BMI. General: Patient is alert, oriented x 3, not in acute distress    HEENT:   Sclerae are not injected and appear moist.  There is no alopecia. Chest:  Breathing comfortably at room air    Musculoskeletal exam:  A comprehensive musculoskeletal exam was NOT performed for all joints of each upper and lower extremity and assessed for swelling, tenderness and range of motion.  Positive results are documented as below:    Previous Exam    18/18 tender points (RESOLVED)  No interphalangeal tenderness  Bilateral patellar laxity with tenderness    Z-Deformities:   no  [place]ck Deformities:  no  Boutonierre's Deformities:  no  Ulnar Deviation:   no  MCP Subluxation:  no    Joint Count 10/27/2020 7/31/2020   Patient pain (0-100) 10 60   MHAQ 0.5 0.875   Left wrist- Tender 0 1   Left wrist- Swollen 0 0   Left 1st MCP - Tender - 1   Left 1st MCP - Swollen - 0   Left 2nd PIP - Tender - 1   Left 2nd PIP - Swollen - 1   Left 3rd PIP - Tender - 1   Left 3rd PIP - Swollen - 1   Left 4th PIP - Tender - 1   Left 4th PIP - Swollen - 0   Left 5th PIP - Tender - 1   Left 5th PIP - Swollen - 0   Right wrist- Tender - 1   Right wrist- Swollen - 1   Right 1st MCP - Tender - 1   Right 1st MCP - Swollen - 1   Right 3rd MCP - Tender - 1   Right 3rd MCP - Swollen - 1   Right 4th MCP - Tender - 1   Right 4th MCP - Swollen - 0   Right 5th MCP - Tender - 1   Right 5th MCP - Swollen - 0   Right 2nd PIP - Tender - 1   Right 2nd PIP - Swollen - 1   Right 3rd PIP - Tender - 1   Right 3rd PIP - Swollen - 1   Right 4th PIP - Tender - 1   Right 4th PIP - Swollen - 1   Tender Joint Count (Total) 0 14   Swollen Joint Count (Total) 0 8   Physician Assessment (0-10) 0 3   Patient Assessment (0-10) 1 8   CDAI Total (calculated) 1 33       DATA REVIEW    Laboratory     Recent laboratory results were reviewed, summarized, and discussed with the patient. Imaging    Musculoskeletal Ultrasound    None    Radiographs    Bilateral Knee 6/16/2013: overall alignment is normal. The joint spaces are preserved and only minimal arthritic changes are present, with tiny osteophytes present at the margins of the femorotibial compartments bilaterally. There is no evidence of joint effusion, erosions, or other features to suggest inflammatory arthritis. Bilateral hand radiograph showed Alignment and bone mineralization is normal bilaterally. No significant arthritic changes are present. CT Imaging    CT Abdomen and PElvis without contrast 9/28/2020: LOWER THORAX: No significant abnormality in the incidentally imaged lower chest. LIVER: No mass. BILIARY TREE: Gallbladder is within normal limits. CBD is not dilated. SPLEEN: within normal limits. PANCREAS: No focal abnormality. ADRENALS: Unremarkable. KIDNEYS/URETERS: Small nonobstructing bilateral renal stones. STOMACH: Unremarkable. SMALL BOWEL: No dilatation or wall thickening. COLON: No dilatation or wall thickening. APPENDIX: Unremarkable. PERITONEUM: No ascites or pneumoperitoneum. RETROPERITONEUM: Multiple prominent lymph nodes in the upper abdomen including gastrohepatic ligament, periportal space, and periaortic marylu chains without significant change. REPRODUCTIVE ORGANS: Unremarkable URINARY BLADDER: No mass or calculus. BONES: No destructive bone lesion. ABDOMINAL WALL: No mass or hernia. MR Imaging    MRI Lumbar Spine without contrast 5/12/2014: Alignment: There are 5 nonrib-bearing lumbar type vertebra. There is a normal lumbar lordosis without listhesis. Intervertebral discs: There is L2 and L5 intervertebral disc desiccation with mild height loss at L2 and moderate height loss at the L5 level with mild bulging at L2 and more moderate bulging, anterior extrusion, and dorsal disc protrusion of the L5 disc level. Vertebral bodies/ marrow: There is endplate Modic type II signal demonstrated at the T12, L2 and L5 levels. A small Schmorl's node is seen at the anterior, superior L1 vertebral level. There is no marrow signal to indicate a marrow replacement process or fracture. Conus and cauda equina: The conus terminates at the T12 level and demonstrates normal signal characteristics. The nerve roots of the cauda equina are normal in appearance. Paravertebral soft tissues: There is no paraspinal mass or fluid collection identified. By level: L1-L2: No canal or neural foraminal stenosis. L2-L3: Mild concentric disc bulge canal or neural foraminal stenosis. L3-L4: There is mild facet osteoarthritis without canal or neural foraminal stenosis. L4-L5: There is a left foraminal disc protrusion without canal or neural foraminal stenosis. Mild facet osteoarthritis is also noted. L5-S1: There is a broad-based left paracentral disc protrusion which is not associated with canal or neural foraminal stenosis. MRI Lumbar Spine without contrast 6/01/2011: Lumbar spine alignment is within normal limits. Chronic Schmorl's node formation in the superior endplates of L1 and L3 and in the inferior  endplate of L5. No compression deformity. Mild multilevel disc desiccation. Conus medullaris terminates at T12-L1. Signal and caliber of the distal cord and conus are within normal limits. Paraspinal soft tissues are within normal limits. T12-L1: facet arthrosis. No herniation or stenosis. L1-L2: Facet arthrosis. No herniation or stenosis. L2-L3: Diffuse disc bulge, facet arthrosis, and thickened ligamentum flavum. Mild central spinal canal stenosis.  L3-L4: Diffuse disc bulge, facet arthrosis, and thickened ligamentum flavum. No stenosis. L4-L5: Diffuse disc bulge, facet arthrosis, and thickened ligamentum flavum. No stenosis. L5-S1: Diffuse disc bulge, facet arthrosis, and thickened ligamentum flavum. Mild left foraminal stenosis. DXA     None    ASSESSMENT AND PLAN    This is a follow-up visit for Ms. Medeiros. 1) Undifferentiated Inflammatory Arthritis, likely Seronegative Rheumatoid Arthritits.      She has been on methotrexate 20 mg every Tuesday with good tolerance and improvement. She no longer has her inflammatory hand or elbow symptoms/flares like before. She feels some pain in her hands and knees due to the cold, although her knee pain is PFS. Her CDAI was previously 1 (previously 33) with 0 tender and 0 swollen joints, consistent with remission. I will continue treatment.     I ordered labs which will be mailed.    2) Secondary Fibromyalgia. This resolved with treatment for her underlying inflammatory arthritis.    3) Long Term Use of Immunosuppressants. The patient remains on immunomodulatory medications (methotrexate) and requires frequent toxicity monitoring by blood work. Respective labs were ordered (CBC and CMP) and mailed. 4) Patellofemoral Arthralgia. Physical therapy which helped her pain. It is a little active now due to the cold.    5) Vitamin D Deficiency. The patient's vitamin D level was 15.4. She is on weekly ergocalciferol 50,000. I will check her level. 5) Chronic Back Pain. Physical therapy resolved this. The patient voiced understanding of the aforementioned assessment and plan.      The patient has consented for synchronous (real-time) Telemedicine (audio-video technology) on 12/28/2020 for their care to be delivered over telemedicine in place of their regularly scheduled office visit pursuant to the emergency declaration under the [address] [address] waiver authority and the [employer] Act, this Virtual  Visit was conducted, with patient's consent, to reduce the patient's risk of exposure to COVID-19 and provide continuity of care for an established patient. Services were provided through a video synchronous discussion virtually to substitute for in-person clinic visit.     TODAY'S ORDERS    Orders Placed This Encounter    C REACTIVE PROTEIN, QT    CBC WITH AUTOMATED DIFF    METABOLIC PANEL, COMPREHENSIVE    METHOTREXATE POLYGLUTAMATES    VITAMIN D, 25 HYDROXY    SED RATE (ESR)     Future Appointments   Date Time Provider Sonia Amado   1/19/2021 10:00 AM [facility] CT 1 MRCT ST. Lily Lara MD, [address] [facility]    Adult Rheumatology   Rheumatology Ultrasound Certified  [facility]  A Part of [address], [address] [place] [address]   Phone [phone]  Fax [phone]

## 2021-01-19 ENCOUNTER — HOSPITAL ENCOUNTER (OUTPATIENT)
Dept: CT IMAGING | Age: 46
Discharge: HOME | End: 2021-01-19
Attending: FAMILY MEDICINE
Payer: COMMERCIAL

## 2021-01-19 ENCOUNTER — TELEPHONE (OUTPATIENT)
Dept: FAMILY MEDICINE CLINIC | Age: 46
End: 2021-01-19

## 2021-01-19 DIAGNOSIS — N20.0 RENAL STONES: Primary | ICD-10-CM

## 2021-01-19 DIAGNOSIS — R59.0 LYMPHADENOPATHY, ABDOMINAL: ICD-10-CM

## 2021-01-19 PROCEDURE — 74011000636 HC RX REV CODE- 636: Performed by: RADIOLOGY

## 2021-01-19 PROCEDURE — 74177 CT ABD & PELVIS W/CONTRAST: CPT

## 2021-01-19 RX ADMIN — IOPAMIDOL 100 ML: 755 INJECTION, SOLUTION INTRAVENOUS at 10:14

## 2021-01-19 NOTE — TELEPHONE ENCOUNTER
Please call patient and let her know her CT is unchanged, but she still has kidney stones, so I would like for her to see urology. I have printed a referral request..

## 2021-01-19 NOTE — TELEPHONE ENCOUNTER
After verifying patient's ID, advised patient per Dr Vishnu Miller Please call patient and let her know her CT is unchanged, but she still has kidney stones, so I would like for her to see urology. I have printed a referral request.. Patient verbalized understanding and states that she already has an appointment scheduled with urology in a couple of months. No further questions nor concerns at this time.

## 2021-01-26 LAB
25(OH)D3+25(OH)D2 SERPL-MCNC: 30.2 NG/ML (ref 30–100)
ALBUMIN SERPL-MCNC: 4.4 G/DL (ref 3.8–4.8)
ALBUMIN/GLOB SERPL: 1.7 {RATIO} (ref 1.2–2.2)
ALP SERPL-CCNC: 134 IU/L (ref 39–117)
ALT SERPL-CCNC: 13 IU/L (ref 0–32)
AST SERPL-CCNC: 26 IU/L (ref 0–40)
BASOPHILS # BLD AUTO: 0 X10E3/UL (ref 0–0.2)
BASOPHILS NFR BLD AUTO: 0 %
BILIRUB SERPL-MCNC: 0.5 MG/DL (ref 0–1.2)
BUN SERPL-MCNC: 10 MG/DL (ref 6–24)
BUN/CREAT SERPL: 13 (ref 9–23)
CALCIUM SERPL-MCNC: 9.4 MG/DL (ref 8.7–10.2)
CHLORIDE SERPL-SCNC: 103 MMOL/L (ref 96–106)
CO2 SERPL-SCNC: 24 MMOL/L (ref 20–29)
CREAT SERPL-MCNC: 0.76 MG/DL (ref 0.57–1)
CRP SERPL-MCNC: 22 MG/L (ref 0–10)
EOSINOPHIL # BLD AUTO: 0.1 X10E3/UL (ref 0–0.4)
EOSINOPHIL NFR BLD AUTO: 2 %
ERYTHROCYTE [DISTWIDTH] IN BLOOD BY AUTOMATED COUNT: 16.6 % (ref 11.7–15.4)
ERYTHROCYTE [SEDIMENTATION RATE] IN BLOOD BY WESTERGREN METHOD: 32 MM/HR (ref 0–32)
GLOBULIN SER CALC-MCNC: 2.6 G/DL (ref 1.5–4.5)
GLUCOSE SERPL-MCNC: 89 MG/DL (ref 65–99)
HCT VFR BLD AUTO: 32.6 % (ref 34–46.6)
HGB BLD-MCNC: 10.1 G/DL (ref 11.1–15.9)
IMM GRANULOCYTES # BLD AUTO: 0 X10E3/UL (ref 0–0.1)
IMM GRANULOCYTES NFR BLD AUTO: 0 %
LYMPHOCYTES # BLD AUTO: 1 X10E3/UL (ref 0.7–3.1)
LYMPHOCYTES NFR BLD AUTO: 19 %
MCH RBC QN AUTO: 26.9 PG (ref 26.6–33)
MCHC RBC AUTO-ENTMCNC: 31 G/DL (ref 31.5–35.7)
MCV RBC AUTO: 87 FL (ref 79–97)
METHOTREXATE PG1: 58.95 NMOL/L RBC
METHOTREXATE PG2: 27.28 NMOL/L RBC
METHOTREXATE PG3: 54.63 NMOL/L RBC
METHOTREXATE PG4: 16.48 NMOL/L RBC
METHOTREXATE PG5: 4.04 NMOL/L RBC
METHOTREXATE-PG TOTAL: 161.39 NMOL/L RBC
MONOCYTES # BLD AUTO: 0.2 X10E3/UL (ref 0.1–0.9)
MONOCYTES NFR BLD AUTO: 3 %
MTXPGSRA INTERPRETATION: NORMAL
NEUTROPHILS # BLD AUTO: 4.2 X10E3/UL (ref 1.4–7)
NEUTROPHILS NFR BLD AUTO: 76 %
PLATELET # BLD AUTO: 263 X10E3/UL (ref 150–450)
POTASSIUM SERPL-SCNC: 4.7 MMOL/L (ref 3.5–5.2)
PROT SERPL-MCNC: 7 G/DL (ref 6–8.5)
RBC # BLD AUTO: 3.76 X10E6/UL (ref 3.77–5.28)
SODIUM SERPL-SCNC: 140 MMOL/L (ref 134–144)
WBC # BLD AUTO: 5.5 X10E3/UL (ref 3.4–10.8)

## 2021-01-26 NOTE — PROGRESS NOTES
The results were reviewed. Mild stable anemic (Hct 32.6%). elevated inflammatory markers (, CRP 22 mg/L). Remaining labs are good.

## 2021-01-29 ENCOUNTER — TELEPHONE (OUTPATIENT)
Dept: FAMILY MEDICINE CLINIC | Age: 46
End: 2021-01-29

## 2021-01-29 NOTE — TELEPHONE ENCOUNTER
Called and spoke with pt. Advised I have no documentation of her CT not being covered. Advised normally our schedulers gain authorization needed and if they run into issues, they will advise the office however I do not see that communication in her chart. Advised pt to call the scheduling department, number provided, and ask if they have information/documenttion on test being covered. Pt agrees with plan.

## 2021-01-29 NOTE — TELEPHONE ENCOUNTER
Pt called regarding letter she received on 1/25/21 advising her the CT she had done at [address] [place] [address] on 1/19/21 was not covered by her insurance. Pt denies getting a bill but states she can't afford to pay for this type of test out of pocket and wants to know why no one called her if the test wasn't covered.  Geoffrey

## 2021-03-03 ENCOUNTER — VIRTUAL VISIT (OUTPATIENT)
Dept: RHEUMATOLOGY | Age: 46
End: 2021-03-03
Payer: COMMERCIAL

## 2021-03-03 DIAGNOSIS — E55.9 VITAMIN D DEFICIENCY: ICD-10-CM

## 2021-03-03 DIAGNOSIS — M06.09 SERONEGATIVE RHEUMATOID ARTHRITIS OF MULTIPLE SITES (HCC): Primary | ICD-10-CM

## 2021-03-03 DIAGNOSIS — M06.4 UNDIFFERENTIATED INFLAMMATORY ARTHRITIS (HCC): ICD-10-CM

## 2021-03-03 DIAGNOSIS — Z79.60 LONG-TERM USE OF IMMUNOSUPPRESSANT MEDICATION: ICD-10-CM

## 2021-03-03 PROCEDURE — 99215 OFFICE O/P EST HI 40 MIN: CPT | Performed by: INTERNAL MEDICINE

## 2021-03-03 RX ORDER — METHOTREXATE 2.5 MG/1
20 TABLET ORAL
Qty: 96 TAB | Refills: 1 | Status: SHIPPED | OUTPATIENT
Start: 2021-03-09 | End: 2021-09-29 | Stop reason: SDUPTHER

## 2021-03-03 NOTE — PROGRESS NOTES
REASON FOR VISIT    This is a follow-up visit for Ms. Medeiros for     ICD-10-CM   1. Seronegative rheumatoid arthritis of multiple sites [facility])  M06.09     The patient has consented for synchronous (real-time) Telemedicine (audio-video technology) on 3/3/2021 for their care to be delivered over telemedicine in place of their regularly scheduled office visit pursuant to the emergency declaration under the [address] and the [address] authority and the [employer] Act, this Virtual  Visit was conducted, with patient's consent, to reduce the patient's risk of exposure to COVID-19 and provide continuity of care for an established patient. Services were provided through a video synchronous discussion virtually to substitute for in-person clinic visit. Inflammatory arthritis phenotype includes:  Anti-CCP positive: no  Rheumatoid factor positive: no  HLA-B27 positive: no  Erosive disease: N/A  Extra-articular manifestations include: secondary fibromyalgia     Immunosuppression Screening (7/31/2020):  Quantiferon TB: negative  PPD:  Not performed  Hepatitis B: negative  Hepatitis C: negative    Therapy History includes:  Current DMARD therapy include: methotrexate 20 mg every Tuesday (10/27/2020 to present)  Prior DMARD therapy include: methotrexate 15 mg every Tuesday (8/11/2020 to 10/27/2020)  Discontinued DMARDs because of inefficacy: None  Discontinued DMARDs because of side effects: None    HISTORY OF PRESENT ILLNESS    Ms. Michelle Perez returns for a follow-up. On her last visit, I continued methotrexate 20 mg every Tuesday, which she has taken with good tolerance. Today, she feels sore in her wrists that has been constant since yesterday but had been intermittent for about 2 weeks and is having a hard time holding things or driving. The pain does not improve as the day goes on or with use. It feels worse with use.  There is no swelling or stiffness. APAP helps a little. She has not taken an NSAIDs. She reports intermittent pain in her hands but today, there is no pain, swelling or stiffness. She denies pain, swelling, or stiffness in her wrists. She continues to do her home exercises for patellofemoral arthralgia. She denies fever, weight loss, blurred vision, vision loss, oral ulcers, ankle swelling, dry cough, dyspnea, nausea, vomiting, dysphagia, abdominal pain, black or bloody stool, fall since last visit, rash, easy bruising and increased thirst.    Most recent toxicity monitoring by blood work was done on 1/15/2021 and did not reveal any significant adverse effects, except Hct 32.6%, . Most recent inflammatory markers from 1/15/2021 revealed a ESR 32 mm/hr and CRP 22 mg/L. I reviewed the patient's interval medical history, surgical history, medications, and allergies. The patient has not had any interval hospital admissions, infections, or surgeries. REVIEW OF SYSTEMS    A comprehensive review of systems was performed and pertinent results are documented in the HPI, review of systems is otherwise non-contributory. PAST MEDICAL HISTORY    She has a past medical history of DDD (degenerative disc disease), lumbosacral, Migraine, Osteoarthritis, Renal stones, Sciatica, and Tubular adenoma of colon (6/23/2020). FAMILY HISTORY    Her family history includes Arthritis-rheumatoid in her father; Gout in her brother and mother; No Known Problems in her sister, son, and another family member. SOCIAL HISTORY    She reports that she quit smoking about 6 years ago. She smoked 0.50 packs per day. She has never used smokeless tobacco. She reports previous alcohol use of about 3.3 standard drinks of alcohol per week. She reports that she does not use drugs. MEDICATIONS    Current Outpatient Medications   Medication Sig    tofacitinib 11 mg Tb24 Take 11 mg by mouth daily.  Indications: rheumatoid arthritis  [START ON 3/9/2021] methotrexate (RHEUMATREX) 2.5 mg tablet Take 8 Tabs by mouth every Tuesday.  folic acid (FOLVITE) 1 mg tablet Take 1 Tab by mouth daily.  ergocalciferol (ERGOCALCIFEROL) 1,250 mcg (50,000 unit) capsule Take 1 Cap by mouth every seven (7) days. Indications: vitamin D deficiency (high dose therapy)    fluticasone propionate (Flonase Allergy Relief) 50 mcg/actuation nasal spray 2 Sprays by Both Nostrils route daily.  pantoprazole (PROTONIX) 20 mg tablet Take 20 mg by mouth daily.  polyethylene glycol (Miralax) 17 gram/dose powder Take 17 g by mouth daily. No current facility-administered medications for this visit. ALLERGIES    Allergies   Allergen Reactions    Sulfa (Sulfonamide Antibiotics) Rash       PHYSICAL EXAMINATION    There were no vitals taken for this visit. There is no height or weight on file to calculate BMI. General: Patient is alert, oriented x 3, not in acute distress    HEENT:   Sclerae are not injected and appear moist.  There is no alopecia. Chest:  Breathing comfortably at room air    Musculoskeletal exam:  A comprehensive musculoskeletal exam was NOT performed for all joints of each upper and lower extremity and assessed for swelling, tenderness and range of motion.  Positive results are documented as below:    Previous Exam    18/18 tender points (RESOLVED)  No interphalangeal tenderness  Bilateral patellar laxity with tenderness    Z-Deformities:   no  Sasabe Neck Deformities:  no  Boutonierre's Deformities:  no  Ulnar Deviation:   no  MCP Subluxation:  no    Joint Count 10/27/2020 7/31/2020   Patient pain (0-100) 10 60   MHAQ 0.5 0.875   Left wrist- Tender 0 1   Left wrist- Swollen 0 0   Left 1st MCP - Tender - 1   Left 1st MCP - Swollen - 0   Left 2nd PIP - Tender - 1   Left 2nd PIP - Swollen - 1   Left 3rd PIP - Tender - 1   Left 3rd PIP - Swollen - 1   Left 4th PIP - Tender - 1   Left 4th PIP - Swollen - 0   Left 5th PIP - Tender - 1   Left 5th PIP - Swollen - 0   Right wrist- Tender - 1   Right wrist- Swollen - 1   Right 1st MCP - Tender - 1   Right 1st MCP - Swollen - 1   Right 3rd MCP - Tender - 1   Right 3rd MCP - Swollen - 1   Right 4th MCP - Tender - 1   Right 4th MCP - Swollen - 0   Right 5th MCP - Tender - 1   Right 5th MCP - Swollen - 0   Right 2nd PIP - Tender - 1   Right 2nd PIP - Swollen - 1   Right 3rd PIP - Tender - 1   Right 3rd PIP - Swollen - 1   Right 4th PIP - Tender - 1   Right 4th PIP - Swollen - 1   Tender Joint Count (Total) 0 14   Swollen Joint Count (Total) 0 8   Physician Assessment (0-10) 0 3   Patient Assessment (0-10) 1 8   CDAI Total (calculated) 1 33       DATA REVIEW    Laboratory     Recent laboratory results were reviewed, summarized, and discussed with the patient. Imaging    Musculoskeletal Ultrasound    None    Radiographs    Bilateral Knee 6/16/2013: overall alignment is normal. The joint spaces are preserved and only minimal arthritic changes are present, with tiny osteophytes present at the margins of the femorotibial compartments bilaterally. There is no evidence of joint effusion, erosions, or other features to suggest inflammatory arthritis. Bilateral hand radiograph showed Alignment and bone mineralization is normal bilaterally. No significant arthritic changes are present. CT Imaging    CT Abdomen and PElvis without contrast 9/28/2020: LOWER THORAX: No significant abnormality in the incidentally imaged lower chest. LIVER: No mass. BILIARY TREE: Gallbladder is within normal limits. CBD is not dilated. SPLEEN: within normal limits. PANCREAS: No focal abnormality. ADRENALS: Unremarkable. KIDNEYS/URETERS: Small nonobstructing bilateral renal stones. STOMACH: Unremarkable. SMALL BOWEL: No dilatation or wall thickening. COLON: No dilatation or wall thickening. APPENDIX: Unremarkable. PERITONEUM: No ascites or pneumoperitoneum.  RETROPERITONEUM: Multiple prominent lymph nodes in the upper abdomen including gastrohepatic ligament, periportal space, and periaortic marylu chains without significant change. REPRODUCTIVE ORGANS: Unremarkable URINARY BLADDER: No mass or calculus. BONES: No destructive bone lesion. ABDOMINAL WALL: No mass or hernia. MR Imaging    MRI Lumbar Spine without contrast 5/12/2014: Alignment: There are 5 nonrib-bearing lumbar type vertebra. There is a normal lumbar lordosis without listhesis. Intervertebral discs: There is L2 and L5 intervertebral disc desiccation with mild height loss at L2 and moderate height loss at the L5 level with mild bulging at L2 and more moderate bulging, anterior extrusion, and dorsal disc protrusion of the L5 disc level. Vertebral bodies/ marrow: There is endplate Modic type II signal demonstrated at the T12, L2 and L5 levels. A small Schmorl's node is seen at the anterior, superior L1 vertebral level. There is no marrow signal to indicate a marrow replacement process or fracture. Conus and cauda equina: The conus terminates at the T12 level and demonstrates normal signal characteristics. The nerve roots of the cauda equina are normal in appearance. Paravertebral soft tissues: There is no paraspinal mass or fluid collection identified. By level: L1-L2: No canal or neural foraminal stenosis. L2-L3: Mild concentric disc bulge canal or neural foraminal stenosis. L3-L4: There is mild facet osteoarthritis without canal or neural foraminal stenosis. L4-L5: There is a left foraminal disc protrusion without canal or neural foraminal stenosis. Mild facet osteoarthritis is also noted. L5-S1: There is a broad-based left paracentral disc protrusion which is not associated with canal or neural foraminal stenosis. MRI Lumbar Spine without contrast 6/01/2011: Lumbar spine alignment is within normal limits. Chronic Schmorl's node formation in the superior endplates of L1 and L3 and in the inferior  endplate of L5. No compression deformity. Mild multilevel disc desiccation.  Conus medullaris terminates at T12-L1. Signal and caliber of the distal cord and conus are within normal limits. Paraspinal soft tissues are within normal limits. T12-L1: facet arthrosis. No herniation or stenosis. L1-L2: Facet arthrosis. No herniation or stenosis. L2-L3: Diffuse disc bulge, facet arthrosis, and thickened ligamentum flavum. Mild central spinal canal stenosis. L3-L4: Diffuse disc bulge, facet arthrosis, and thickened ligamentum flavum. No stenosis. L4-L5: Diffuse disc bulge, facet arthrosis, and thickened ligamentum flavum. No stenosis. L5-S1: Diffuse disc bulge, facet arthrosis, and thickened ligamentum flavum. Mild left foraminal stenosis. DXA     None    ASSESSMENT AND PLAN    This is a follow-up visit for Ms. Medeiros. 1) Seronegative Rheumatoid Arthritits.      She has been on methotrexate 20 mg every Tuesday with good tolerance and improvement. She has been flaring in her wrists for the past 2 weeks and is having a hard time holding things or driving. Her CDAI was previously 1 (previously 33) with 0 tender and 0 swollen joints, consistent with remission. I will continue treatment.     The patient's aforementioned chronic illness has progressed. The patient has not suffered a side effect from treatment. I discussed with her about advancing therapy to a biologic (small particle versus anti-TNF versus anti-IL-7 versus anti-CD20). We discussed the potential adverse effects, which include infections, such as upper respiratory infections, latent TB reactivation, viral hepatitis activation, and routes of administration (oral versus infusion versus subcutaneous). The patient was informed about the low risk for lymphoma based on at least 5 years of post-market data and the likely inherent relation between chronic autoimmune diseases, such as Rheumatoid Arthritis, and lymphoma.  The patient was informed these medications co-pay are subject to the patient's insurance coverage and we will not know until it has been submitted to the insurance company. She is unable to do injections on her own. She preferred an oral agent. An order will be submitted today Xeljanz 11 mg XR. She declines prednisone. I have not discussed my assessment and plan with the patient's care team, but I have forwarded my office note to them.     2) Secondary Fibromyalgia. This resolved with treatment for her underlying inflammatory arthritis.    3) Long Term Use of Immunosuppressants. The patient remains on high risk immunomodulatory medications (methotrexate) and requires frequent toxicity monitoring by blood work to evaluate for toxicities. The patient's most recent labs on 1/15/2021, which I reviewed with them, were unremarkable. Respective labs were ordered (CBC and CMP) and lab slip mailed. 4) Patellofemoral Arthralgia. Physical therapy which helped her pain. It is a little active now due to the cold.    5) Vitamin D Deficiency. The patient's vitamin D level was 30.2 (previously 15.4). She is on weekly ergocalciferol 50,000. I will check her level. 5) Chronic Back Pain. Physical therapy resolved this. The patient voiced understanding of the aforementioned assessment and plan. The patient has consented for synchronous (real-time) Telemedicine (audio-video technology) on 3/3/2021 for their care to be delivered over telemedicine in place of their regularly scheduled office visit pursuant to the emergency declaration under the [facility], [address] waiver authority and the [employer] Act, this Virtual  Visit was conducted, with patient's consent, to reduce the patient's risk of exposure to COVID-19 and provide continuity of care for an established patient.      A total of 40 minutes was spent on this visit, reviewing interval notes, interval testing results, ordering tests, refilling medications, documenting the findings in the note, patient education, counseling, and coordination of care as described above. All questions asked and answered. Services were provided through a video synchronous discussion virtually to substitute for in-person clinic visit.     TODAY'S ORDERS    Orders Placed This Encounter    CBC WITH AUTOMATED DIFF    METABOLIC PANEL, COMPREHENSIVE    C REACTIVE PROTEIN, QT    SED RATE (ESR)    VITAMIN D, 25 HYDROXY    tofacitinib 11 mg Tb24    methotrexate (RHEUMATREX) 2.5 mg tablet     Future Appointments   Date Time Provider Sonia Ingris   7/21/2021  8:20 AM Helio Shafer MD AOCR BS AMB     Vaughn Coy MD, [address] [place] [address]    Adult Rheumatology   Rheumatology Ultrasound Certified  [facility]  [facility] of [facility], [address] [facility][1]   Phone [phone]  Fax [phone]

## 2021-03-04 ENCOUNTER — DOCUMENTATION ONLY (OUTPATIENT)
Dept: PHARMACY | Age: 46
End: 2021-03-04

## 2021-03-04 NOTE — PROGRESS NOTES
[facility] at [address] [facility] Update    Date: 03/04/21    Jose Ramon De Leon 1975     Medication: Almer Sins XR 11mg tablet    Prior Authorization: 3-4-2021 to 3-4-2021    Co-pay $0    Fill: 3/4/2021    Ship: 3/4/2021    Deliver: 3/5/2021    Next Fill Due: 3/31/2021    Pharmacist offered counseling to the patient. Handout provided.     Mervin Schirmer, [address] at [facility],  Sarah Lewis   [place], [address]  phone: [phone]   fax: [phone]

## 2021-04-13 LAB
25(OH)D3+25(OH)D2 SERPL-MCNC: 35.8 NG/ML (ref 30–100)
ALBUMIN SERPL-MCNC: 4.2 G/DL (ref 3.8–4.8)
ALBUMIN/GLOB SERPL: 1.6 {RATIO} (ref 1.2–2.2)
ALP SERPL-CCNC: 122 IU/L (ref 39–117)
ALT SERPL-CCNC: 15 IU/L (ref 0–32)
AST SERPL-CCNC: 23 IU/L (ref 0–40)
BASOPHILS # BLD AUTO: 0 X10E3/UL (ref 0–0.2)
BASOPHILS NFR BLD AUTO: 0 %
BILIRUB SERPL-MCNC: 0.4 MG/DL (ref 0–1.2)
BUN SERPL-MCNC: 15 MG/DL (ref 6–24)
BUN/CREAT SERPL: 17 (ref 9–23)
CALCIUM SERPL-MCNC: 9.5 MG/DL (ref 8.7–10.2)
CHLORIDE SERPL-SCNC: 102 MMOL/L (ref 96–106)
CO2 SERPL-SCNC: 24 MMOL/L (ref 20–29)
CREAT SERPL-MCNC: 0.88 MG/DL (ref 0.57–1)
CRP SERPL-MCNC: 10 MG/L (ref 0–10)
EOSINOPHIL # BLD AUTO: 0 X10E3/UL (ref 0–0.4)
EOSINOPHIL NFR BLD AUTO: 1 %
ERYTHROCYTE [DISTWIDTH] IN BLOOD BY AUTOMATED COUNT: 17.2 % (ref 11.7–15.4)
ERYTHROCYTE [SEDIMENTATION RATE] IN BLOOD BY WESTERGREN METHOD: 44 MM/HR (ref 0–32)
GLOBULIN SER CALC-MCNC: 2.7 G/DL (ref 1.5–4.5)
GLUCOSE SERPL-MCNC: 113 MG/DL (ref 65–99)
HCT VFR BLD AUTO: 31.3 % (ref 34–46.6)
HGB BLD-MCNC: 10 G/DL (ref 11.1–15.9)
IMM GRANULOCYTES # BLD AUTO: 0 X10E3/UL (ref 0–0.1)
IMM GRANULOCYTES NFR BLD AUTO: 0 %
LYMPHOCYTES # BLD AUTO: 0.7 X10E3/UL (ref 0.7–3.1)
LYMPHOCYTES NFR BLD AUTO: 15 %
MCH RBC QN AUTO: 27.5 PG (ref 26.6–33)
MCHC RBC AUTO-ENTMCNC: 31.9 G/DL (ref 31.5–35.7)
MCV RBC AUTO: 86 FL (ref 79–97)
MONOCYTES # BLD AUTO: 0.2 X10E3/UL (ref 0.1–0.9)
MONOCYTES NFR BLD AUTO: 3 %
NEUTROPHILS # BLD AUTO: 3.9 X10E3/UL (ref 1.4–7)
NEUTROPHILS NFR BLD AUTO: 81 %
PLATELET # BLD AUTO: 239 X10E3/UL (ref 150–450)
POTASSIUM SERPL-SCNC: 4.4 MMOL/L (ref 3.5–5.2)
PROT SERPL-MCNC: 6.9 G/DL (ref 6–8.5)
RBC # BLD AUTO: 3.63 X10E6/UL (ref 3.77–5.28)
SODIUM SERPL-SCNC: 142 MMOL/L (ref 134–144)
WBC # BLD AUTO: 4.8 X10E3/UL (ref 3.4–10.8)

## 2021-04-13 NOTE — PROGRESS NOTES
The results were reviewed. Stable anemia Hct 31.3%,ESR 44 mm/hr, which may be due to inflammation versus anemia. Slightly elevated , which has improved, which could be a marker for inflammation. Remaining labs are good.

## 2021-05-20 ENCOUNTER — HOSPITAL ENCOUNTER (EMERGENCY)
Age: 46
Discharge: HOME | End: 2021-05-20
Attending: EMERGENCY MEDICINE | Admitting: EMERGENCY MEDICINE
Payer: COMMERCIAL

## 2021-05-20 ENCOUNTER — APPOINTMENT (OUTPATIENT)
Dept: GENERAL RADIOLOGY | Age: 46
End: 2021-05-20
Attending: EMERGENCY MEDICINE
Payer: COMMERCIAL

## 2021-05-20 VITALS
BODY MASS INDEX: 42.57 KG/M2 | WEIGHT: 249.34 LBS | RESPIRATION RATE: 16 BRPM | SYSTOLIC BLOOD PRESSURE: 107 MMHG | HEART RATE: 58 BPM | TEMPERATURE: 98.3 F | DIASTOLIC BLOOD PRESSURE: 64 MMHG | OXYGEN SATURATION: 98 % | HEIGHT: 64 IN

## 2021-05-20 DIAGNOSIS — M85.60 SUBCHONDRAL CYST: ICD-10-CM

## 2021-05-20 DIAGNOSIS — M19.042 OSTEOARTHRITIS OF LEFT HAND, UNSPECIFIED OSTEOARTHRITIS TYPE: ICD-10-CM

## 2021-05-20 DIAGNOSIS — M79.645 PAIN OF LEFT THUMB: Primary | ICD-10-CM

## 2021-05-20 PROCEDURE — 73130 X-RAY EXAM OF HAND: CPT

## 2021-05-20 PROCEDURE — 74011250636 HC RX REV CODE- 250/636: Performed by: EMERGENCY MEDICINE

## 2021-05-20 PROCEDURE — 96372 THER/PROPH/DIAG INJ SC/IM: CPT

## 2021-05-20 PROCEDURE — 74011250637 HC RX REV CODE- 250/637: Performed by: EMERGENCY MEDICINE

## 2021-05-20 PROCEDURE — 99284 EMERGENCY DEPT VISIT MOD MDM: CPT

## 2021-05-20 RX ORDER — KETOROLAC TROMETHAMINE 30 MG/ML
30 INJECTION, SOLUTION INTRAMUSCULAR; INTRAVENOUS ONCE
Status: COMPLETED | OUTPATIENT
Start: 2021-05-20 | End: 2021-05-20

## 2021-05-20 RX ORDER — ACETAMINOPHEN 500 MG
1000 TABLET ORAL ONCE
Status: COMPLETED | OUTPATIENT
Start: 2021-05-20 | End: 2021-05-20

## 2021-05-20 RX ADMIN — ACETAMINOPHEN 1000 MG: 500 TABLET, FILM COATED ORAL at 08:15

## 2021-05-20 RX ADMIN — KETOROLAC TROMETHAMINE 30 MG: 30 INJECTION, SOLUTION INTRAMUSCULAR; INTRAVENOUS at 08:16

## 2021-05-20 NOTE — ED NOTES
Patient ambulated to room without difficulty. Patient resting on the stretcher. V/S reassessed. Call bell within reach; will continue to monitor.

## 2021-05-20 NOTE — ED TRIAGE NOTES
Pt arrived to be assessed for left thumb pain extending to the wrist, X's 1 month. She felt a pop when moving it,pain is at about a 7 on a 10 point scale. Pt did not take anything for pain today before coming in.

## 2021-05-20 NOTE — ED NOTES
The patient was discharged home by [facility] RN in stable condition. The patient is alert and oriented, in no respiratory distress and discharge vital signs obtained. The patient's diagnosis, condition and treatment were explained. Pt was given a left thumb Spica for comfort. The patient expressed understanding. No prescriptions given. No work/school note given. A discharge plan has been developed. A  was not involved in the process. Aftercare instructions were given. Pt ambulatory out of the ED with no difficulties.

## 2021-05-20 NOTE — ED PROVIDER NOTES
76-year-old female with history of degenerative disc disease of the low back, migraines, osteoarthritis, sciatica, and tubular adenoma of the colon is here with left thumb pain for the last 1.5 to 2 months after no known injury. She is right-hand dominant. She says she felt a pop when she was moving the thumb, but cannot recall any specific injury. No known trauma. No fever or nausea or other systemic signs. She states the thumb looks swollen, but others have not noticed that. No redness or heat. No drainage. Fingers of the left hand feels fine, but the thumb is in pain, especially with any movement. Sensation is normal.  Wrist elbow and the rest of the arm seem to be fine. No other joints are bothering her. No dysuria. No rash. She has tried Tylenol with limited relief.            Past Medical History:   Diagnosis Date    DDD (degenerative disc disease), lumbosacral     Migraine     Osteoarthritis     Renal stones     Sciatica     Tubular adenoma of colon 2020       Past Surgical History:   Procedure Laterality Date    COLONOSCOPY N/A 2020    COLONOSCOPY performed by Richard Cuello MD at [address] [facility] HX  SECTION  1996    HX [address] [facility]    HX TUBAL LIGATION      HX WISDOM TEETH EXTRACTION      VA BREAST SURGERY PROCEDURE UNLISTED      right breast biopsy - benign         Family History:   Problem Relation Age of Onset    Gout Mother    [facility] Arthritis-rheumatoid Father     No Known Problems Sister     Gout Brother     No Known Problems Other     No Known Problems Son        Social History     Socioeconomic History    Marital status:      Spouse name: Not on file    Number of children: Not on file    Years of education: Not on file    Highest education level: Not on file   Occupational History    Not on file   Tobacco Use    Smoking status: Former Smoker     Packs/day: 0.50     Quit date: 2014     Years since quittin.4  Smokeless tobacco: Never Used   Vaping Use    Vaping Use: Never used   Substance and Sexual Activity    Alcohol use: Not Currently     Alcohol/week: 3.3 standard drinks     Types: 4 Standard drinks or equivalent per week    Drug use: No    Sexual activity: Not Currently     Partners: Male   Other Topics Concern    Not on file   Social History Narrative    Not on file     Social Determinants of Health     Financial Resource Strain:     Difficulty of Paying Living Expenses:    Food Insecurity:     Worried About Running Out of Food in the Last Year:     Ran Out of Food in the Last Year:    Transportation Needs:     Lack of Transportation (Medical):  Lack of Transportation (Non-Medical):    Physical Activity:     Days of Exercise per Week:     Minutes of Exercise per Session:    Stress:     Feeling of Stress :    Social Connections:     Frequency of Communication with Friends and Family:     Frequency of Social Gatherings with Friends and Family:     Attends [employer] Services:     Active Member of Clubs or Organizations:     Attends Club or Organization Meetings:     Marital Status:    Intimate Partner Violence:     Fear of Current or Ex-Partner:     Emotionally Abused:     Physically Abused:     Sexually Abused: ALLERGIES: Sulfa (sulfonamide antibiotics)    Review of Systems   Constitutional: Negative for fever. HENT: Negative for trouble swallowing. Eyes: Negative for visual disturbance. Respiratory: Negative for cough. Cardiovascular: Negative for chest pain. Gastrointestinal: Negative for abdominal pain. Genitourinary: Negative for difficulty urinating. Musculoskeletal: Negative for gait problem. Skin: Negative for rash. Neurological: Negative for headaches. Hematological: Does not bruise/bleed easily. Psychiatric/Behavioral: Negative for sleep disturbance.        Vitals:    05/20/21 0735 05/20/21 0743   BP: 131/73    Pulse: 74    Resp: 16    Temp: 98.3 °F (36.8 °C)    SpO2: 99% 99%   Weight: 113.1 kg (249 lb 5.4 oz)    Height: 5' 4\" (1.626 m)             Physical Exam  Constitutional:       Appearance: Normal appearance. HENT:      Head: Normocephalic. Nose: Nose normal.      Mouth/Throat:      Mouth: Mucous membranes are moist.   Eyes:      Extraocular Movements: Extraocular movements intact. Conjunctiva/sclera: Conjunctivae normal.   Cardiovascular:      Rate and Rhythm: Normal rate. Pulmonary:      Effort: Pulmonary effort is normal. No respiratory distress. Abdominal:      General: Abdomen is flat. Musculoskeletal:      Comments: No swelling  Tenderness near the thenar eminence and in the thumb, but no fluctuance or induration  No erythema  Cap refill is normal  2+ radial  Sensation intact to light touch over the entire thumb, the pointer finger, and the small finger  Able to make an okay sign, but pain with doing so  Same with thumbs up and fist  Able to extend all fingers   Skin:     Findings: No rash. Neurological:      General: No focal deficit present. Mental Status: She is alert.    Psychiatric:         Behavior: Behavior normal.          MDM  Number of Diagnoses or Management Options  Diagnosis management comments: Neurovascularly seems to be intact  No evidence of any tendon rupture, but there could certainly be tendinopathy  Plan to get an x-ray to look for any other pathology and likely have her follow-up with hand  Toradol and Tylenol given here and NSAIDs and splint for home          Procedures

## 2021-07-21 ENCOUNTER — VIRTUAL VISIT (OUTPATIENT)
Dept: RHEUMATOLOGY | Age: 46
End: 2021-07-21
Payer: COMMERCIAL

## 2021-07-21 DIAGNOSIS — E55.9 VITAMIN D DEFICIENCY: ICD-10-CM

## 2021-07-21 DIAGNOSIS — Z79.60 LONG-TERM USE OF IMMUNOSUPPRESSANT MEDICATION: ICD-10-CM

## 2021-07-21 DIAGNOSIS — E78.5 DYSLIPIDEMIA: ICD-10-CM

## 2021-07-21 DIAGNOSIS — M06.09 SERONEGATIVE RHEUMATOID ARTHRITIS OF MULTIPLE SITES (HCC): Primary | ICD-10-CM

## 2021-07-21 PROCEDURE — 99215 OFFICE O/P EST HI 40 MIN: CPT | Performed by: INTERNAL MEDICINE

## 2021-07-21 NOTE — PROGRESS NOTES
REASON FOR VISIT    This is a follow-up visit for Ms. Medeiros for     ICD-10-CM   1. Seronegative rheumatoid arthritis of multiple sites [facility])  M06.09     The patient has consented for synchronous (real-time) Telemedicine (audio-video technology) on 7/21/2021 for their care to be delivered over telemedicine in place of their regularly scheduled office visit pursuant to the emergency declaration under the [address] [place] [address] and the [employer] Act, this Virtual  Visit was conducted, with patient's consent, to reduce the patient's risk of exposure to COVID-19 and provide continuity of care for an established patient. Services were provided through a video synchronous discussion virtually to substitute for in-person clinic visit. Inflammatory arthritis phenotype includes:  Anti-CCP positive: no  Rheumatoid factor positive: no  HLA-B27 positive: no  Erosive disease: N/A  Extra-articular manifestations include: secondary fibromyalgia     Immunosuppression Screening (7/31/2020):  Quantiferon TB: negative  PPD:  Not performed  Hepatitis B: negative  Hepatitis C: negative    Therapy History includes:  Current DMARD therapy include: methotrexate 20 mg every Tuesday (10/27/2020 to present), Abraham Sacks 11 mg XR daily (3/05/2021 to present)  Prior DMARD therapy include: methotrexate 15 mg every Tuesday (8/11/2020 to 10/27/2020)  Discontinued DMARDs because of inefficacy: None  Discontinued DMARDs because of side effects: None    HISTORY OF PRESENT ILLNESS    Ms. Fabio Lanes returns for a follow-up. On her last visit, I continued methotrexate 20 mg every Tuesday and started Xeljanz 11 mg XR daily, which she has taken with good tolerance. Today, she feels better on Abraham Sacks. She reports having left thumb issues for several months. She went to urgent care and radiograph showed lateral subluxation of the 1st MCP.  She saw orthopedic, Dr. Burton [facility] on 5/26/2021 (office note reviewed), who injected it with benefit for a couple weeks but then had recurrence. She no longer has pain, swelling, or stiffness in her hands or wrists. She received her COVID vaccine. She denies fever, weight loss, blurred vision, vision loss, oral ulcers, ankle swelling, dry cough, dyspnea, nausea, vomiting, dysphagia, abdominal pain, black or bloody stool, fall since last visit, rash, easy bruising and increased thirst.    Most recent toxicity monitoring by blood work was done on 4/12/2021 and did not reveal any significant adverse effects, except Hct 31.3%, . Most recent inflammatory markers from 4/12/2021 revealed a ESR 44 mm/hr and CRP 10 mg/L. I reviewed the patient's interval medical history, surgical history, medications, and allergies. The patient has not had any interval hospital admissions, infections, or surgeries. REVIEW OF SYSTEMS    A comprehensive review of systems was performed and pertinent results are documented in the HPI, review of systems is otherwise non-contributory. PAST MEDICAL HISTORY    She has a past medical history of DDD (degenerative disc disease), lumbosacral, Migraine, Osteoarthritis, Renal stones, Sciatica, and Tubular adenoma of colon (6/23/2020). FAMILY HISTORY    Her family history includes Arthritis-rheumatoid in her father; Gout in her brother and mother; No Known Problems in her sister, son, and another family member. SOCIAL HISTORY    She reports that she quit smoking about 6 years ago. She smoked 0.50 packs per day. She has never used smokeless tobacco. She reports previous alcohol use of about 3.3 standard drinks of alcohol per week. She reports that she does not use drugs. MEDICATIONS    Current Outpatient Medications   Medication Sig    tofacitinib 11 mg Tb24 Take 11 mg by mouth daily.  Indications: rheumatoid arthritis    methotrexate (RHEUMATREX) 2.5 mg tablet Take 8 Tabs by mouth every Tuesday.  folic acid (FOLVITE) 1 mg tablet Take 1 Tab by mouth daily.  fluticasone propionate (Flonase Allergy Relief) 50 mcg/actuation nasal spray 2 Sprays by Both Nostrils route daily. (Patient not taking: Reported on 5/20/2021)    ergocalciferol (ERGOCALCIFEROL) 1,250 mcg (50,000 unit) capsule Take 1 Cap by mouth every seven (7) days. Indications: vitamin D deficiency (high dose therapy)    pantoprazole (PROTONIX) 20 mg tablet Take 20 mg by mouth daily. (Patient not taking: Reported on 5/20/2021)    polyethylene glycol (Miralax) 17 gram/dose powder Take 17 g by mouth daily. No current facility-administered medications for this visit. ALLERGIES    Allergies   Allergen Reactions    Sulfa (Sulfonamide Antibiotics) Rash       PHYSICAL EXAMINATION    There were no vitals taken for this visit. There is no height or weight on file to calculate BMI. General: Patient is alert, oriented x 3, not in acute distress    HEENT:   Sclerae are not injected and appear moist.  There is no alopecia. Chest:  Breathing comfortably at room air    Musculoskeletal exam:  A comprehensive musculoskeletal exam was NOT performed for all joints of each upper and lower extremity and assessed for swelling, tenderness and range of motion.  Positive results are documented as below:    Previous Exam    18/18 tender points (RESOLVED)  No interphalangeal tenderness  Bilateral patellar laxity with tenderness    Z-Deformities:   no  [place] Neck Deformities:  no  Boutonierre's Deformities:  no  Ulnar Deviation:   no  MCP Subluxation:  no    Joint Count 10/27/2020 7/31/2020   Patient pain (0-100) 10 60   MHAQ 0.5 0.875   Left wrist- Tender 0 1   Left wrist- Swollen 0 0   Left 1st MCP - Tender - 1   Left 1st MCP - Swollen - 0   Left 2nd PIP - Tender - 1   Left 2nd PIP - Swollen - 1   Left 3rd PIP - Tender - 1   Left 3rd PIP - Swollen - 1   Left 4th PIP - Tender - 1   Left 4th PIP - Swollen - 0   Left 5th PIP - Tender - 1   Left 5th PIP - Swollen - 0   Right wrist- Tender - 1   Right wrist- Swollen - 1   Right 1st MCP - Tender - 1   Right 1st MCP - Swollen - 1   Right 3rd MCP - Tender - 1   Right 3rd MCP - Swollen - 1   Right 4th MCP - Tender - 1   Right 4th MCP - Swollen - 0   Right 5th MCP - Tender - 1   Right 5th MCP - Swollen - 0   Right 2nd PIP - Tender - 1   Right 2nd PIP - Swollen - 1   Right 3rd PIP - Tender - 1   Right 3rd PIP - Swollen - 1   Right 4th PIP - Tender - 1   Right 4th PIP - Swollen - 1   Tender Joint Count (Total) 0 14   Swollen Joint Count (Total) 0 8   Physician Assessment (0-10) 0 3   Patient Assessment (0-10) 1 8   CDAI Total (calculated) 1 33       DATA REVIEW    Laboratory     Recent laboratory results were reviewed, summarized, and discussed with the patient. Imaging    Musculoskeletal Ultrasound    None    Radiographs    Bilateral Knee 6/16/2013: overall alignment is normal. The joint spaces are preserved and only minimal arthritic changes are present, with tiny osteophytes present at the margins of the femorotibial compartments bilaterally. There is no evidence of joint effusion, erosions, or other features to suggest inflammatory arthritis. Bilateral hand radiograph showed Alignment and bone mineralization is normal bilaterally. No significant arthritic changes are present. CT Imaging    CT Abdomen and PElvis without contrast 9/28/2020: LOWER THORAX: No significant abnormality in the incidentally imaged lower chest. LIVER: No mass. BILIARY TREE: Gallbladder is within normal limits. CBD is not dilated. SPLEEN: within normal limits. PANCREAS: No focal abnormality. ADRENALS: Unremarkable. KIDNEYS/URETERS: Small nonobstructing bilateral renal stones. STOMACH: Unremarkable. SMALL BOWEL: No dilatation or wall thickening. COLON: No dilatation or wall thickening. APPENDIX: Unremarkable. PERITONEUM: No ascites or pneumoperitoneum.  RETROPERITONEUM: Multiple prominent lymph nodes in the upper abdomen including gastrohepatic ligament, periportal space, and periaortic marylu chains without significant change. REPRODUCTIVE ORGANS: Unremarkable URINARY BLADDER: No mass or calculus. BONES: No destructive bone lesion. ABDOMINAL WALL: No mass or hernia. MR Imaging    MRI Lumbar Spine without contrast 5/12/2014: Alignment: There are 5 nonrib-bearing lumbar type vertebra. There is a normal lumbar lordosis without listhesis. Intervertebral discs: There is L2 and L5 intervertebral disc desiccation with mild height loss at L2 and moderate height loss at the L5 level with mild bulging at L2 and more moderate bulging, anterior extrusion, and dorsal disc protrusion of the L5 disc level. Vertebral bodies/ marrow: There is endplate Modic type II signal demonstrated at the T12, L2 and L5 levels. A small Schmorl's node is seen at the anterior, superior L1 vertebral level. There is no marrow signal to indicate a marrow replacement process or fracture. Conus and cauda equina: The conus terminates at the T12 level and demonstrates normal signal characteristics. The nerve roots of the cauda equina are normal in appearance. Paravertebral soft tissues: There is no paraspinal mass or fluid collection identified. By level: L1-L2: No canal or neural foraminal stenosis. L2-L3: Mild concentric disc bulge canal or neural foraminal stenosis. L3-L4: There is mild facet osteoarthritis without canal or neural foraminal stenosis. L4-L5: There is a left foraminal disc protrusion without canal or neural foraminal stenosis. Mild facet osteoarthritis is also noted. L5-S1: There is a broad-based left paracentral disc protrusion which is not associated with canal or neural foraminal stenosis. MRI Lumbar Spine without contrast 6/01/2011: Lumbar spine alignment is within normal limits. Chronic Schmorl's node formation in the superior endplates of L1 and L3 and in the inferior  endplate of L5. No compression deformity.  Mild multilevel disc desiccation. Conus medullaris terminates at T12-L1. Signal and caliber of the distal cord and conus are within normal limits. Paraspinal soft tissues are within normal limits. T12-L1: facet arthrosis. No herniation or stenosis. L1-L2: Facet arthrosis. No herniation or stenosis. L2-L3: Diffuse disc bulge, facet arthrosis, and thickened ligamentum flavum. Mild central spinal canal stenosis. L3-L4: Diffuse disc bulge, facet arthrosis, and thickened ligamentum flavum. No stenosis. L4-L5: Diffuse disc bulge, facet arthrosis, and thickened ligamentum flavum. No stenosis. L5-S1: Diffuse disc bulge, facet arthrosis, and thickened ligamentum flavum. Mild left foraminal stenosis. DXA     None    ASSESSMENT AND PLAN    This is a follow-up visit for Ms. Medeiros. 1) Seronegative Rheumatoid Arthritits.      She is been on methotrexate 20 mg every Tuesday and Xeljanz 11 mg XR daily, which she has taken with good tolerance and improvement. She is no longer flaring now that she is on [email or web address]. She denies inflammatory joint pain. Her main complaint is left CMC osteoarthritis. Her CDAI was previously 1 (previously 33) with 0 tender and 0 swollen joints, consistent with remission. I will continue treatment.    2) Secondary Fibromyalgia. This resolved with treatment for her underlying inflammatory arthritis.    3) Long Term Use of Immunosuppressants. The patient remains on high risk immunomodulatory medications (methotrexate, [email or web address]) and requires frequent toxicity monitoring by blood work to evaluate for toxicities. Respective labs were ordered (see below orders for details). 4) Patellofemoral Arthralgia. Physical therapy which helped her pain. It is a little active now due to the cold.    5) Vitamin D Deficiency. The patient's vitamin D level was 35.8 (previously 30.2, 15.4). She is on weekly ergocalciferol 50,000. I will check her level. 6) Chronic Back Pain. Physical therapy resolved this.     7) Left Aia 16 Osteoarthritis. I recommended she follow up with orthopedics. I explained that his is not Rheumatoid Arthritis. The patient voiced understanding of the aforementioned assessment and plan. The patient has consented for synchronous (real-time) Telemedicine (audio-video technology) on 7/21/2021 for their care to be delivered over telemedicine in place of their regularly scheduled office visit pursuant to the emergency declaration under the [address] [place] [address] [facility] authority and the [employer] Act, this Virtual  Visit was conducted, with patient's consent, to reduce the patient's risk of exposure to COVID-19 and provide continuity of care for an established patient. A total of 41 minutes was spent on this visit, reviewing interval notes, interval testing results, ordering tests, refilling medications, documenting the findings in the note, patient education, counseling, and coordination of care as described above. All questions asked and answered. Services were provided through a video synchronous discussion virtually to substitute for in-person clinic visit.     TODAY'S ORDERS    Orders Placed This Encounter    CBC WITH AUTOMATED DIFF    METABOLIC PANEL, COMPREHENSIVE    C REACTIVE PROTEIN, QT    SED RATE (ESR)    METHOTREXATE POLYGLUTAMATES    VITAMIN D, 25 HYDROXY    QUANTIFERON-TB PLUS(CLIENT INCUB.)    CHRONIC HEPATITIS PANEL    LIPID PANEL     Future Appointments   Date Time Provider Sonia Ingris   10/29/2021  9:40 AM Aimee Shafer MD [facility] BS AMB     Tesha Portillo MD, [address] [place] [address]    Adult Rheumatology   Rheumatology Ultrasound Certified  General acute hospital  A Part of [facility], [facility]   Phone [phone]  Fax [phone]

## 2021-07-24 ENCOUNTER — HOSPITAL ENCOUNTER (EMERGENCY)
Age: 46
Discharge: HOME | End: 2021-07-24
Attending: EMERGENCY MEDICINE
Payer: COMMERCIAL

## 2021-07-24 ENCOUNTER — APPOINTMENT (OUTPATIENT)
Dept: CT IMAGING | Age: 46
End: 2021-07-24
Attending: EMERGENCY MEDICINE
Payer: COMMERCIAL

## 2021-07-24 VITALS
SYSTOLIC BLOOD PRESSURE: 111 MMHG | TEMPERATURE: 97.6 F | DIASTOLIC BLOOD PRESSURE: 68 MMHG | HEART RATE: 62 BPM | BODY MASS INDEX: 44.05 KG/M2 | WEIGHT: 258 LBS | OXYGEN SATURATION: 98 % | HEIGHT: 64 IN | RESPIRATION RATE: 16 BRPM

## 2021-07-24 DIAGNOSIS — R19.7 DIARRHEA, UNSPECIFIED TYPE: ICD-10-CM

## 2021-07-24 DIAGNOSIS — R10.816 EPIGASTRIC ABDOMINAL TENDERNESS WITHOUT REBOUND TENDERNESS: Primary | ICD-10-CM

## 2021-07-24 DIAGNOSIS — R11.2 NON-INTRACTABLE VOMITING WITH NAUSEA, UNSPECIFIED VOMITING TYPE: ICD-10-CM

## 2021-07-24 LAB
ALBUMIN SERPL-MCNC: 3.8 G/DL (ref 3.5–5)
ALBUMIN/GLOB SERPL: 1 {RATIO} (ref 1.1–2.2)
ALP SERPL-CCNC: 108 U/L (ref 45–117)
ALT SERPL-CCNC: 26 U/L (ref 12–78)
ANION GAP SERPL CALC-SCNC: 5 MMOL/L (ref 5–15)
AST SERPL-CCNC: 37 U/L (ref 15–37)
BASOPHILS # BLD: 0 K/UL (ref 0–0.1)
BASOPHILS NFR BLD: 0 % (ref 0–1)
BILIRUB SERPL-MCNC: 0.5 MG/DL (ref 0.2–1)
BUN SERPL-MCNC: 11 MG/DL (ref 6–20)
BUN/CREAT SERPL: 15 (ref 12–20)
CALCIUM SERPL-MCNC: 8.9 MG/DL (ref 8.5–10.1)
CHLORIDE SERPL-SCNC: 107 MMOL/L (ref 97–108)
CO2 SERPL-SCNC: 26 MMOL/L (ref 21–32)
COMMENT, HOLDF: NORMAL
CREAT SERPL-MCNC: 0.72 MG/DL (ref 0.55–1.02)
DIFFERENTIAL METHOD BLD: ABNORMAL
EOSINOPHIL # BLD: 0 K/UL (ref 0–0.4)
EOSINOPHIL NFR BLD: 1 % (ref 0–7)
ERYTHROCYTE [DISTWIDTH] IN BLOOD BY AUTOMATED COUNT: 17.1 % (ref 11.5–14.5)
GLOBULIN SER CALC-MCNC: 3.7 G/DL (ref 2–4)
GLUCOSE SERPL-MCNC: 107 MG/DL (ref 65–100)
HCG UR QL: NEGATIVE
HCT VFR BLD AUTO: 30.9 % (ref 35–47)
HGB BLD-MCNC: 9.9 G/DL (ref 11.5–16)
IMM GRANULOCYTES # BLD AUTO: 0 K/UL (ref 0–0.04)
IMM GRANULOCYTES NFR BLD AUTO: 0 % (ref 0–0.5)
LIPASE SERPL-CCNC: 67 U/L (ref 73–393)
LYMPHOCYTES # BLD: 0.9 K/UL (ref 0.8–3.5)
LYMPHOCYTES NFR BLD: 10 % (ref 12–49)
MCH RBC QN AUTO: 28.8 PG (ref 26–34)
MCHC RBC AUTO-ENTMCNC: 32 G/DL (ref 30–36.5)
MCV RBC AUTO: 89.8 FL (ref 80–99)
MONOCYTES # BLD: 0.2 K/UL (ref 0–1)
MONOCYTES NFR BLD: 2 % (ref 5–13)
NEUTS SEG # BLD: 7.1 K/UL (ref 1.8–8)
NEUTS SEG NFR BLD: 87 % (ref 32–75)
NRBC # BLD: 0 K/UL (ref 0–0.01)
NRBC BLD-RTO: 0 PER 100 WBC
PLATELET # BLD AUTO: 232 K/UL (ref 150–400)
PMV BLD AUTO: 10.7 FL (ref 8.9–12.9)
POTASSIUM SERPL-SCNC: 3.7 MMOL/L (ref 3.5–5.1)
PROT SERPL-MCNC: 7.5 G/DL (ref 6.4–8.2)
RBC # BLD AUTO: 3.44 M/UL (ref 3.8–5.2)
SAMPLES BEING HELD,HOLD: NORMAL
SODIUM SERPL-SCNC: 138 MMOL/L (ref 136–145)
WBC # BLD AUTO: 8.3 K/UL (ref 3.6–11)

## 2021-07-24 PROCEDURE — 85025 COMPLETE CBC W/AUTO DIFF WBC: CPT

## 2021-07-24 PROCEDURE — 74177 CT ABD & PELVIS W/CONTRAST: CPT

## 2021-07-24 PROCEDURE — 74011250636 HC RX REV CODE- 250/636: Performed by: EMERGENCY MEDICINE

## 2021-07-24 PROCEDURE — 74011250636 HC RX REV CODE- 250/636

## 2021-07-24 PROCEDURE — 80053 COMPREHEN METABOLIC PANEL: CPT

## 2021-07-24 PROCEDURE — 74011000636 HC RX REV CODE- 636: Performed by: RADIOLOGY

## 2021-07-24 PROCEDURE — 96374 THER/PROPH/DIAG INJ IV PUSH: CPT

## 2021-07-24 PROCEDURE — 36415 COLL VENOUS BLD VENIPUNCTURE: CPT

## 2021-07-24 PROCEDURE — 96375 TX/PRO/DX INJ NEW DRUG ADDON: CPT

## 2021-07-24 PROCEDURE — 83690 ASSAY OF LIPASE: CPT

## 2021-07-24 PROCEDURE — 81025 URINE PREGNANCY TEST: CPT

## 2021-07-24 PROCEDURE — 99284 EMERGENCY DEPT VISIT MOD MDM: CPT

## 2021-07-24 RX ORDER — ONDANSETRON 4 MG/1
4 TABLET, ORALLY DISINTEGRATING ORAL
Qty: 20 TABLET | Refills: 0 | Status: SHIPPED | OUTPATIENT
Start: 2021-07-24 | End: 2021-10-29 | Stop reason: ALTCHOICE

## 2021-07-24 RX ORDER — PROCHLORPERAZINE EDISYLATE 5 MG/ML
10 INJECTION INTRAMUSCULAR; INTRAVENOUS
Status: COMPLETED | OUTPATIENT
Start: 2021-07-24 | End: 2021-07-24

## 2021-07-24 RX ORDER — DICYCLOMINE HYDROCHLORIDE 20 MG/1
20 TABLET ORAL EVERY 6 HOURS
Qty: 20 TABLET | Refills: 0 | Status: SHIPPED | OUTPATIENT
Start: 2021-07-24 | End: 2021-07-29

## 2021-07-24 RX ORDER — FAMOTIDINE 10 MG/ML
20 INJECTION INTRAVENOUS
Status: COMPLETED | OUTPATIENT
Start: 2021-07-24 | End: 2021-07-24

## 2021-07-24 RX ORDER — MORPHINE SULFATE 4 MG/ML
4 INJECTION INTRAVENOUS ONCE
Status: COMPLETED | OUTPATIENT
Start: 2021-07-24 | End: 2021-07-24

## 2021-07-24 RX ORDER — ONDANSETRON 2 MG/ML
4 INJECTION INTRAMUSCULAR; INTRAVENOUS
Status: COMPLETED | OUTPATIENT
Start: 2021-07-24 | End: 2021-07-24

## 2021-07-24 RX ORDER — PROCHLORPERAZINE EDISYLATE 5 MG/ML
INJECTION INTRAMUSCULAR; INTRAVENOUS
Status: COMPLETED
Start: 2021-07-24 | End: 2021-07-24

## 2021-07-24 RX ORDER — LOPERAMIDE HCL 2 MG
2 TABLET ORAL
Qty: 20 TABLET | Refills: 0 | Status: SHIPPED | OUTPATIENT
Start: 2021-07-24 | End: 2021-07-29

## 2021-07-24 RX ORDER — HYDROMORPHONE HYDROCHLORIDE 1 MG/ML
1 INJECTION, SOLUTION INTRAMUSCULAR; INTRAVENOUS; SUBCUTANEOUS
Status: COMPLETED | OUTPATIENT
Start: 2021-07-24 | End: 2021-07-24

## 2021-07-24 RX ADMIN — IOPAMIDOL 100 ML: 755 INJECTION, SOLUTION INTRAVENOUS at 15:58

## 2021-07-24 RX ADMIN — HYDROMORPHONE HYDROCHLORIDE 1 MG: 1 INJECTION, SOLUTION INTRAMUSCULAR; INTRAVENOUS; SUBCUTANEOUS at 16:39

## 2021-07-24 RX ADMIN — PROCHLORPERAZINE EDISYLATE 10 MG: 5 INJECTION INTRAMUSCULAR; INTRAVENOUS at 15:59

## 2021-07-24 RX ADMIN — SODIUM CHLORIDE 1000 ML: 9 INJECTION, SOLUTION INTRAVENOUS at 14:01

## 2021-07-24 RX ADMIN — MORPHINE SULFATE 4 MG: 4 INJECTION, SOLUTION INTRAMUSCULAR; INTRAVENOUS at 14:12

## 2021-07-24 RX ADMIN — FAMOTIDINE 20 MG: 10 INJECTION, SOLUTION INTRAVENOUS at 16:39

## 2021-07-24 RX ADMIN — ONDANSETRON 4 MG: 2 INJECTION INTRAMUSCULAR; INTRAVENOUS at 14:12

## 2021-07-24 RX ADMIN — PROCHLORPERAZINE EDISYLATE 10 MG: 5 INJECTION INTRAMUSCULAR; INTRAVENOUS at 15:55

## 2021-07-24 NOTE — ED NOTES
Discussed the CT findings with the patient. Her pain is improved at this point. She feels like she can go home. She has been discharged on Zofran, Bentyl and Imodium. Advised to follow-up with her PCP as needed.

## 2021-07-24 NOTE — ED TRIAGE NOTES
Patient arrives via EMS with c/o abd pain x 2 hours ago. Patient also c/o nausea and diarrhea. Patient reports eating this morning without issue. Denies injuries and denies taking any medication.

## 2021-07-24 NOTE — ED PROVIDER NOTES
79-year-old female history of degenerative disc disease, migraines, sciatica presents to the emergency room by EMS today with chief complaint of nausea and vomiting and diarrhea for the past 2 hours. She fell when she woke up this morning and had a sudden onset approximately an hour after she ate of nausea vomiting and diarrhea. The history is provided by the patient and medical records. Abdominal Pain   This is a new problem. The current episode started 1 to 2 hours ago. The problem occurs constantly. The problem has not changed since onset. The pain is moderate. Associated symptoms include diarrhea, nausea and vomiting. Pertinent negatives include no fever, no dysuria, no headaches, no arthralgias, no myalgias, no trauma, no chest pain and no back pain.   Nausea   Associated symptoms include abdominal pain and diarrhea. Pertinent negatives include no fever, no headaches, no arthralgias, no myalgias, no cough and no headaches. Diarrhea   Associated symptoms include diarrhea, nausea and vomiting. Pertinent negatives include no fever, no dysuria, no headaches, no arthralgias, no myalgias, no trauma, no chest pain and no back pain.         Past Medical History:   Diagnosis Date    DDD (degenerative disc disease), lumbosacral     Migraine     Osteoarthritis     Renal stones     Sciatica     Tubular adenoma of colon 2020       Past Surgical History:   Procedure Laterality Date    COLONOSCOPY N/A 2020    COLONOSCOPY performed by Petra Espino MD at [facility] HX  SECTION  1996    HX [address]    HX TUBAL LIGATION      HX WISDOM TEETH EXTRACTION      SC BREAST SURGERY PROCEDURE UNLISTED      right breast biopsy - benign         Family History:   Problem Relation Age of Onset    Gout Mother    [place] Arthritis-rheumatoid Father     No Known Problems Sister     Gout Brother     No Known Problems Other     No Known Problems Son Social History     Socioeconomic History    Marital status:      Spouse name: Not on file    Number of children: Not on file    Years of education: Not on file    Highest education level: Not on file   Occupational History    Not on file   Tobacco Use    Smoking status: Former Smoker     Packs/day: 0.50     Quit date: 2014     Years since quittin.6    Smokeless tobacco: Never Used   Vaping Use    Vaping Use: Never used   Substance and Sexual Activity    Alcohol use: Not Currently     Alcohol/week: 3.3 standard drinks     Types: 4 Standard drinks or equivalent per week    Drug use: No    Sexual activity: Not Currently     Partners: Male   Other Topics Concern    Not on file   Social History Narrative    Not on file     Social Determinants of Health     Financial Resource Strain:     Difficulty of Paying Living Expenses:    Food Insecurity:     Worried About Running Out of Food in the Last Year:     Ran Out of Food in the Last Year:    Transportation Needs:     Lack of Transportation (Medical):  Lack of Transportation (Non-Medical):    Physical Activity:     Days of Exercise per Week:     Minutes of Exercise per Session:    Stress:     Feeling of Stress :    Social Connections:     Frequency of Communication with Friends and Family:     Frequency of Social Gatherings with Friends and Family:     Attends Congregational Services:     Active Member of Clubs or Organizations:     Attends Club or Organization Meetings:     Marital Status:    Intimate Partner Violence:     Fear of Current or Ex-Partner:     Emotionally Abused:     Physically Abused:     Sexually Abused: ALLERGIES: Sulfa (sulfonamide antibiotics)    Review of Systems   Constitutional: Negative for fatigue and fever. HENT: Negative for sneezing and sore throat. Respiratory: Negative for cough and shortness of breath. Cardiovascular: Negative for chest pain and leg swelling. Gastrointestinal: Positive for abdominal pain, diarrhea, nausea and vomiting. Genitourinary: Negative for difficulty urinating and dysuria. Musculoskeletal: Negative for arthralgias, back pain and myalgias. Skin: Negative for color change and rash. Neurological: Negative for weakness and headaches. Psychiatric/Behavioral: Negative for agitation and behavioral problems. There were no vitals filed for this visit. Physical Exam  Vitals and nursing note reviewed. Constitutional:       General: She is not in acute distress. Appearance: Normal appearance. She is well-developed. She is not ill-appearing, toxic-appearing or diaphoretic. HENT:      Head: Normocephalic and atraumatic. Nose: Nose normal.      Mouth/Throat:      Mouth: Mucous membranes are moist.      Pharynx: Oropharynx is clear. Eyes:      Extraocular Movements: Extraocular movements intact. Conjunctiva/sclera: Conjunctivae normal.      Pupils: Pupils are equal, round, and reactive to light. Cardiovascular:      Rate and Rhythm: Normal rate and regular rhythm. Pulses: Normal pulses. Heart sounds: Normal heart sounds. Pulmonary:      Effort: Pulmonary effort is normal. No respiratory distress. Breath sounds: Normal breath sounds. No wheezing. Chest:      Chest wall: No tenderness. Abdominal:      General: Abdomen is flat. There is no distension. Palpations: Abdomen is soft. Tenderness: There is abdominal tenderness in the epigastric area. There is no guarding or rebound. Musculoskeletal:         General: No swelling, tenderness, deformity or signs of injury. Normal range of motion. Cervical back: Normal range of motion and neck supple. No rigidity. No muscular tenderness. Right lower leg: No edema. Left lower leg: No edema. Skin:     General: Skin is warm and dry. Capillary Refill: Capillary refill takes less than 2 seconds.    Neurological:      General: No focal deficit present. Mental Status: She is alert and oriented to person, place, and time. Psychiatric:         Mood and Affect: Mood normal.         Behavior: Behavior normal.          MDM       Procedures          2:53 PM  Change of shift. Care of patient signed over to Dr. Adrian Verma. Handoff complete.

## 2021-07-24 NOTE — ED NOTES
Took over the patient from Dr. Stevie Peralta. At the present time the patient continues to have pain. She has mild diffuse tenderness. Her initial labs are normal.  CT scan is pending.

## 2021-07-24 NOTE — DISCHARGE INSTRUCTIONS
Thank you for allowing us to provide you with medical care today. We realize that you have many choices for your emergency care needs. We thank you for choosing [facility]. Please choose us in the future for any continued health care needs. We hope we addressed all of your medical concerns. We strive to provide excellent quality care in the Emergency Department. Anything less than excellent does not meet our expectations. The exam and treatment you received in the Emergency Department were for an emergent problem and are not intended as complete care. It is important that you follow up with a doctor, nurse practitioner, or physician's assistant for ongoing care. If your symptoms worsen or you do not improve as expected and you are unable to reach your usual health care provider, you should return to the Emergency Department. We are available 24 hours a day. Take this sheet with you when you go to your follow-up visit. If you have any problem arranging the follow-up visit, contact the Emergency Department immediately. Make an appointment your family doctor for follow up of this visit. Return to the ER if you are unable to be seen in a timely manner.

## 2021-08-07 LAB
25(OH)D3+25(OH)D2 SERPL-MCNC: 30.2 NG/ML (ref 30–100)
ALBUMIN SERPL-MCNC: 4.1 G/DL (ref 3.8–4.8)
ALBUMIN/GLOB SERPL: 1.6 {RATIO} (ref 1.2–2.2)
ALP SERPL-CCNC: 93 IU/L (ref 48–121)
ALT SERPL-CCNC: 13 IU/L (ref 0–32)
AST SERPL-CCNC: 29 IU/L (ref 0–40)
BASOPHILS # BLD AUTO: 0 X10E3/UL (ref 0–0.2)
BASOPHILS NFR BLD AUTO: 0 %
BILIRUB SERPL-MCNC: 0.5 MG/DL (ref 0–1.2)
BUN SERPL-MCNC: 10 MG/DL (ref 6–24)
BUN/CREAT SERPL: 13 (ref 9–23)
CALCIUM SERPL-MCNC: 9 MG/DL (ref 8.7–10.2)
CHLORIDE SERPL-SCNC: 102 MMOL/L (ref 96–106)
CHOLEST SERPL-MCNC: 204 MG/DL (ref 100–199)
CO2 SERPL-SCNC: 23 MMOL/L (ref 20–29)
COMMENT, 144067: NORMAL
CREAT SERPL-MCNC: 0.8 MG/DL (ref 0.57–1)
CRP SERPL-MCNC: 10 MG/L (ref 0–10)
EOSINOPHIL # BLD AUTO: 0.1 X10E3/UL (ref 0–0.4)
EOSINOPHIL NFR BLD AUTO: 1 %
ERYTHROCYTE [DISTWIDTH] IN BLOOD BY AUTOMATED COUNT: 17.7 % (ref 11.7–15.4)
ERYTHROCYTE [SEDIMENTATION RATE] IN BLOOD BY WESTERGREN METHOD: 6 MM/HR (ref 0–32)
GAMMA INTERFERON BACKGROUND BLD IA-ACNC: 0.27 IU/ML
GLOBULIN SER CALC-MCNC: 2.5 G/DL (ref 1.5–4.5)
GLUCOSE SERPL-MCNC: 86 MG/DL (ref 65–99)
HBV CORE AB SERPL QL IA: NEGATIVE
HBV CORE IGM SERPL QL IA: NEGATIVE
HBV E AB SERPL QL IA: NEGATIVE
HBV E AG SERPL QL IA: NEGATIVE
HBV SURFACE AB SER QL: NON REACTIVE
HBV SURFACE AG SERPL QL IA: NEGATIVE
HCT VFR BLD AUTO: 30.8 % (ref 34–46.6)
HCV AB S/CO SERPL IA: <0.1 S/CO RATIO (ref 0–0.9)
HDLC SERPL-MCNC: 46 MG/DL
HGB BLD-MCNC: 9.8 G/DL (ref 11.1–15.9)
IMM GRANULOCYTES # BLD AUTO: 0 X10E3/UL (ref 0–0.1)
IMM GRANULOCYTES NFR BLD AUTO: 0 %
LDLC SERPL CALC-MCNC: 134 MG/DL (ref 0–99)
LYMPHOCYTES # BLD AUTO: 0.7 X10E3/UL (ref 0.7–3.1)
LYMPHOCYTES NFR BLD AUTO: 15 %
M TB IFN-G BLD-IMP: NEGATIVE
M TB IFN-G CD4+ BCKGRND COR BLD-ACNC: 0.27 IU/ML
MCH RBC QN AUTO: 28.9 PG (ref 26.6–33)
MCHC RBC AUTO-ENTMCNC: 31.8 G/DL (ref 31.5–35.7)
MCV RBC AUTO: 91 FL (ref 79–97)
METHOTREXATE PG1: 300 NMOL/L RBC
METHOTREXATE PG2: 19 NMOL/L RBC
METHOTREXATE PG3: 48 NMOL/L RBC
METHOTREXATE PG4: 13 NMOL/L RBC
METHOTREXATE PG5: 2 NMOL/L RBC
METHOTREXATE-PG TOTAL: 383 NMOL/L RBC
MITOGEN IGNF BLD-ACNC: 0.92 IU/ML
MONOCYTES # BLD AUTO: 0.3 X10E3/UL (ref 0.1–0.9)
MONOCYTES NFR BLD AUTO: 7 %
MTXPGSRA INTERPRETATION: NORMAL
NEUTROPHILS # BLD AUTO: 3.6 X10E3/UL (ref 1.4–7)
NEUTROPHILS NFR BLD AUTO: 77 %
PLATELET # BLD AUTO: 204 X10E3/UL (ref 150–450)
POTASSIUM SERPL-SCNC: 4.1 MMOL/L (ref 3.5–5.2)
PROT SERPL-MCNC: 6.6 G/DL (ref 6–8.5)
QUANTIFERON TB2 AG: 0.29 IU/ML
RBC # BLD AUTO: 3.39 X10E6/UL (ref 3.77–5.28)
SERVICE CMNT-IMP: NORMAL
SODIUM SERPL-SCNC: 138 MMOL/L (ref 134–144)
TRIGL SERPL-MCNC: 133 MG/DL (ref 0–149)
VLDLC SERPL CALC-MCNC: 24 MG/DL (ref 5–40)
WBC # BLD AUTO: 4.7 X10E3/UL (ref 3.4–10.8)

## 2021-08-08 NOTE — PROGRESS NOTES
The results were reviewed. Stable anemia Hct 30.8%. cholesterol 204 mg/dL and  mg/dL, slightly elevated --> work on Arreguin Sachs and lifestyle modification. All remaining labs are normal/negative.

## 2021-09-29 DIAGNOSIS — M06.4 UNDIFFERENTIATED INFLAMMATORY ARTHRITIS (HCC): ICD-10-CM

## 2021-09-29 RX ORDER — METHOTREXATE 2.5 MG/1
20 TABLET ORAL
Qty: 96 TABLET | Refills: 1 | Status: SHIPPED | OUTPATIENT
Start: 2021-10-05 | End: 2022-03-09 | Stop reason: SDUPTHER

## 2021-10-06 ENCOUNTER — TRANSCRIBE ORDER (OUTPATIENT)
Dept: SCHEDULING | Age: 46
End: 2021-10-06

## 2021-10-06 DIAGNOSIS — N64.4 BREAST PAIN, RIGHT: Primary | ICD-10-CM

## 2021-10-07 ENCOUNTER — TRANSCRIBE ORDER (OUTPATIENT)
Dept: SCHEDULING | Age: 46
End: 2021-10-07

## 2021-10-07 DIAGNOSIS — N64.4 BREAST PAIN, RIGHT: Primary | ICD-10-CM

## 2021-10-15 ENCOUNTER — HOSPITAL ENCOUNTER (OUTPATIENT)
Dept: MAMMOGRAPHY | Age: 46
Discharge: HOME | End: 2021-10-15
Attending: STUDENT IN AN ORGANIZED HEALTH CARE EDUCATION/TRAINING PROGRAM
Payer: COMMERCIAL

## 2021-10-15 DIAGNOSIS — N64.4 BREAST PAIN, RIGHT: ICD-10-CM

## 2021-10-15 PROCEDURE — 77066 DX MAMMO INCL CAD BI: CPT

## 2021-10-29 ENCOUNTER — OFFICE VISIT (OUTPATIENT)
Dept: RHEUMATOLOGY | Age: 46
End: 2021-10-29
Payer: COMMERCIAL

## 2021-10-29 VITALS
DIASTOLIC BLOOD PRESSURE: 68 MMHG | HEART RATE: 76 BPM | TEMPERATURE: 98.3 F | RESPIRATION RATE: 18 BRPM | WEIGHT: 242 LBS | BODY MASS INDEX: 41.54 KG/M2 | SYSTOLIC BLOOD PRESSURE: 100 MMHG

## 2021-10-29 DIAGNOSIS — M06.4 UNDIFFERENTIATED INFLAMMATORY ARTHRITIS (HCC): ICD-10-CM

## 2021-10-29 DIAGNOSIS — M06.09 SERONEGATIVE RHEUMATOID ARTHRITIS OF MULTIPLE SITES (HCC): Primary | ICD-10-CM

## 2021-10-29 DIAGNOSIS — M25.562 LEFT LATERAL KNEE PAIN: ICD-10-CM

## 2021-10-29 DIAGNOSIS — E55.9 VITAMIN D DEFICIENCY: ICD-10-CM

## 2021-10-29 DIAGNOSIS — E78.5 DYSLIPIDEMIA: ICD-10-CM

## 2021-10-29 DIAGNOSIS — Z79.60 LONG-TERM USE OF IMMUNOSUPPRESSANT MEDICATION: ICD-10-CM

## 2021-10-29 LAB
25(OH)D3 SERPL-MCNC: 58.2 NG/ML (ref 30–100)
ALBUMIN SERPL-MCNC: 3.7 G/DL (ref 3.5–5)
ALBUMIN/GLOB SERPL: 1.1 {RATIO} (ref 1.1–2.2)
ALP SERPL-CCNC: 109 U/L (ref 45–117)
ALT SERPL-CCNC: 27 U/L (ref 12–78)
ANION GAP SERPL CALC-SCNC: 3 MMOL/L (ref 5–15)
AST SERPL-CCNC: 43 U/L (ref 15–37)
BASOPHILS # BLD: 0 K/UL (ref 0–0.1)
BASOPHILS NFR BLD: 0 % (ref 0–1)
BILIRUB SERPL-MCNC: 0.5 MG/DL (ref 0.2–1)
BUN SERPL-MCNC: 8 MG/DL (ref 6–20)
BUN/CREAT SERPL: 11 (ref 12–20)
CALCIUM SERPL-MCNC: 9.4 MG/DL (ref 8.5–10.1)
CHLORIDE SERPL-SCNC: 108 MMOL/L (ref 97–108)
CHOLEST SERPL-MCNC: 217 MG/DL
CO2 SERPL-SCNC: 28 MMOL/L (ref 21–32)
COMMENT, HOLDF: NORMAL
CREAT SERPL-MCNC: 0.74 MG/DL (ref 0.55–1.02)
CRP SERPL-MCNC: 2.19 MG/DL (ref 0–0.6)
DIFFERENTIAL METHOD BLD: ABNORMAL
EOSINOPHIL # BLD: 0 K/UL (ref 0–0.4)
EOSINOPHIL NFR BLD: 1 % (ref 0–7)
ERYTHROCYTE [DISTWIDTH] IN BLOOD BY AUTOMATED COUNT: 16.7 % (ref 11.5–14.5)
ERYTHROCYTE [SEDIMENTATION RATE] IN BLOOD: 36 MM/HR (ref 0–20)
GLOBULIN SER CALC-MCNC: 3.5 G/DL (ref 2–4)
GLUCOSE SERPL-MCNC: 88 MG/DL (ref 65–100)
HCT VFR BLD AUTO: 32.3 % (ref 35–47)
HDLC SERPL-MCNC: 40 MG/DL
HDLC SERPL: 5.4 {RATIO} (ref 0–5)
HGB BLD-MCNC: 10.1 G/DL (ref 11.5–16)
IMM GRANULOCYTES # BLD AUTO: 0 K/UL (ref 0–0.04)
IMM GRANULOCYTES NFR BLD AUTO: 1 % (ref 0–0.5)
LDLC SERPL CALC-MCNC: 148 MG/DL (ref 0–100)
LYMPHOCYTES # BLD: 0.8 K/UL (ref 0.8–3.5)
LYMPHOCYTES NFR BLD: 20 % (ref 12–49)
MCH RBC QN AUTO: 29.1 PG (ref 26–34)
MCHC RBC AUTO-ENTMCNC: 31.3 G/DL (ref 30–36.5)
MCV RBC AUTO: 93.1 FL (ref 80–99)
MONOCYTES # BLD: 0.2 K/UL (ref 0–1)
MONOCYTES NFR BLD: 4 % (ref 5–13)
NEUTS SEG # BLD: 2.9 K/UL (ref 1.8–8)
NEUTS SEG NFR BLD: 74 % (ref 32–75)
NRBC # BLD: 0 K/UL (ref 0–0.01)
NRBC BLD-RTO: 0 PER 100 WBC
PLATELET # BLD AUTO: 254 K/UL (ref 150–400)
PMV BLD AUTO: 10.7 FL (ref 8.9–12.9)
POTASSIUM SERPL-SCNC: 4.8 MMOL/L (ref 3.5–5.1)
PROT SERPL-MCNC: 7.2 G/DL (ref 6.4–8.2)
RBC # BLD AUTO: 3.47 M/UL (ref 3.8–5.2)
SAMPLES BEING HELD,HOLD: NORMAL
SODIUM SERPL-SCNC: 139 MMOL/L (ref 136–145)
TRIGL SERPL-MCNC: 145 MG/DL (ref ?–150)
VLDLC SERPL CALC-MCNC: 29 MG/DL
WBC # BLD AUTO: 3.9 K/UL (ref 3.6–11)

## 2021-10-29 PROCEDURE — 99215 OFFICE O/P EST HI 40 MIN: CPT | Performed by: INTERNAL MEDICINE

## 2021-10-29 PROCEDURE — 20610 DRAIN/INJ JOINT/BURSA W/O US: CPT | Performed by: INTERNAL MEDICINE

## 2021-10-29 RX ORDER — TRIAMCINOLONE ACETONIDE 40 MG/ML
40 INJECTION, SUSPENSION INTRA-ARTICULAR; INTRAMUSCULAR ONCE
Qty: 1 ML | Refills: 0
Start: 2021-10-29 | End: 2021-10-29

## 2021-10-29 RX ORDER — LIDOCAINE HYDROCHLORIDE 10 MG/ML
1 INJECTION INFILTRATION; PERINEURAL ONCE
Qty: 1 ML | Refills: 0
Start: 2021-10-29 | End: 2021-10-29

## 2021-10-29 RX ORDER — FOLIC ACID 1 MG/1
1 TABLET ORAL DAILY
Qty: 90 TABLET | Refills: 5 | Status: SHIPPED | OUTPATIENT
Start: 2021-10-29 | End: 2022-11-01 | Stop reason: SDUPTHER

## 2021-10-29 NOTE — PROGRESS NOTES
REASON FOR VISIT    This is a follow-up visit for Ms. Medeiros for     ICD-10-CM   1. Seronegative rheumatoid arthritis of multiple sites ([facility].)  M06.09     Inflammatory arthritis phenotype includes:  Anti-CCP positive: no  Rheumatoid factor positive: no  HLA-B27 positive: no  Erosive disease: N/A  Extra-articular manifestations include: secondary fibromyalgia     Immunosuppression Screening (7/31/2020):  Quantiferon TB: negative  PPD:  Not performed  Hepatitis B: negative  Hepatitis C: negative    Therapy History includes:  Current DMARD therapy include: methotrexate 20 mg every Tuesday (10/27/2020 to present), Serina Ghee 11 mg XR daily (3/05/2021 to present)  Prior DMARD therapy include: methotrexate 15 mg every Tuesday (8/11/2020 to 10/27/2020)  Discontinued DMARDs because of inefficacy: None  Discontinued DMARDs because of side effects: None    HISTORY OF PRESENT ILLNESS    Ms. Hina Roy returns for a follow-up. On her last visit, I continued methotrexate 20 mg every Tuesday and started Xeljanz 11 mg XR daily, which she has taken with good tolerance. Today, she feels that her left knee has been hurting her all of sudden and is worse for the past 2 days when she stands or bends. There was no injury. She notes throbbing in her wrists and some fingers due to the the weather is changing to cooler weather or rain. Otherwise, she no longer has pain, swelling, or stiffness in her hands or wrists. She denies fever, weight loss, blurred vision, vision loss, oral ulcers, ankle swelling, dry cough, dyspnea, nausea, vomiting, dysphagia, abdominal pain, black or bloody stool, fall since last visit, rash, easy bruising and increased thirst.    Most recent toxicity monitoring by blood work was done on 7/28/2021 and did not reveal any significant adverse effects, except Hct 30.8%. I reviewed the patient's interval medical history, surgical history, medications, and allergies.     The patient has not had any interval hospital admissions, infections, or surgeries. REVIEW OF SYSTEMS    A comprehensive review of systems was performed and pertinent results are documented in the HPI, review of systems is otherwise non-contributory. PAST MEDICAL HISTORY    She has a past medical history of DDD (degenerative disc disease), lumbosacral, Migraine, Osteoarthritis, Renal stones, Sciatica, and Tubular adenoma of colon (6/23/2020). FAMILY HISTORY    Her family history includes Arthritis-rheumatoid in her father; Gout in her brother and mother; No Known Problems in her sister, son, and another family member. SOCIAL HISTORY    She reports that she quit smoking about 6 years ago. She smoked 0.50 packs per day. She has never used smokeless tobacco. She reports previous alcohol use of about 3.3 standard drinks of alcohol per week. She reports that she does not use drugs. MEDICATIONS    Current Outpatient Medications   Medication Sig    folic acid (FOLVITE) 1 mg tablet Take 1 Tablet by mouth daily.  triamcinolone acetonide (Kenalog) 40 mg/mL injection 1 mL by Intra artICUlar route once for 1 dose.  lidocaine (XYLOCAINE) 10 mg/mL (1 %) injection 1 mL by Intra artICUlar route once for 1 dose.  triamcinolone acetonide (Kenalog) 40 mg/mL injection 1 mL by Intra artICUlar route once for 1 dose.  lidocaine (XYLOCAINE) 10 mg/mL (1 %) injection 1 mL by Intra artICUlar route once for 1 dose.  methotrexate (RHEUMATREX) 2.5 mg tablet Take 8 Tablets by mouth every Tuesday.  tofacitinib 11 mg Tb24 Take 11 mg by mouth daily. Indications: rheumatoid arthritis    ergocalciferol (ERGOCALCIFEROL) 1,250 mcg (50,000 unit) capsule Take 1 Cap by mouth every seven (7) days. Indications: vitamin D deficiency (high dose therapy)     No current facility-administered medications for this visit.      ALLERGIES    Allergies   Allergen Reactions    Sulfa (Sulfonamide Antibiotics) Rash       PHYSICAL EXAMINATION    Visit Vitals  /68   Pulse 76 Temp 98.3 °F (36.8 °C)   Resp 18   Wt 242 lb (109.8 kg)   BMI 41.54 kg/m²     Body mass index is 41.54 kg/m². General: Patient is alert, oriented x 3, not in acute distress    HEENT:   Sclerae are not injected and appear moist.  There is no alopecia. Chest:  Breathing comfortably at room air    Musculoskeletal exam:  A comprehensive musculoskeletal exam was performed for all joints of each upper and lower extremity and assessed for swelling, tenderness and range of motion.  Positive results are documented as below:    Left knee mild swelling with warmth  Bilateral patellar laxity      Z-Deformities:   no  [place]ck Deformities:  no  Boutonierre's Deformities:  no  Ulnar Deviation:   no  MCP Subluxation:  no    Joint Count 10/29/2021 10/27/2020 7/31/2020   Patient pain (0-100) 50 10 60   MHAQ 1 0.5 0.875   Left wrist- Tender 1 0 1   Left wrist- Swollen 0 0 0   Left 1st MCP - Tender - - 1   Left 1st MCP - Swollen - - 0   Left 2nd PIP - Tender - - 1   Left 2nd PIP - Swollen - - 1   Left 3rd PIP - Tender - - 1   Left 3rd PIP - Swollen - - 1   Left 4th PIP - Tender - - 1   Left 4th PIP - Swollen - - 0   Left 5th PIP - Tender - - 1   Left 5th PIP - Swollen - - 0   Left knee - Tender 1 - -   Left knee - Swollen 1 - -   Right wrist- Tender 1 - 1   Right wrist- Swollen 0 - 1   Right 1st MCP - Tender - - 1   Right 1st MCP - Swollen - - 1   Right 3rd MCP - Tender - - 1   Right 3rd MCP - Swollen - - 1   Right 4th MCP - Tender - - 1   Right 4th MCP - Swollen - - 0   Right 5th MCP - Tender - - 1   Right 5th MCP - Swollen - - 0   Right 2nd PIP - Tender - - 1   Right 2nd PIP - Swollen - - 1   Right 3rd PIP - Tender - - 1   Right 3rd PIP - Swollen - - 1   Right 4th PIP - Tender - - 1   Right 4th PIP - Swollen - - 1   Tender Joint Count (Total) 3 0 14   Swollen Joint Count (Total) 1 0 8   Physician Assessment (0-10) 1 0 3   Patient Assessment (0-10) 5 1 8   CDAI Total (calculated) 10 1 33       DATA REVIEW    Laboratory Recent laboratory results were reviewed, summarized, and discussed with the patient. Imaging    Musculoskeletal Ultrasound    None    Radiographs    Bilateral Knee 6/16/2013: overall alignment is normal. The joint spaces are preserved and only minimal arthritic changes are present, with tiny osteophytes present at the margins of the femorotibial compartments bilaterally. There is no evidence of joint effusion, erosions, or other features to suggest inflammatory arthritis. Bilateral hand radiograph showed Alignment and bone mineralization is normal bilaterally. No significant arthritic changes are present. CT Imaging    CT Abdomen and PElvis without contrast 9/28/2020: LOWER THORAX: No significant abnormality in the incidentally imaged lower chest. LIVER: No mass. BILIARY TREE: Gallbladder is within normal limits. CBD is not dilated. SPLEEN: within normal limits. PANCREAS: No focal abnormality. ADRENALS: Unremarkable. KIDNEYS/URETERS: Small nonobstructing bilateral renal stones. STOMACH: Unremarkable. SMALL BOWEL: No dilatation or wall thickening. COLON: No dilatation or wall thickening. APPENDIX: Unremarkable. PERITONEUM: No ascites or pneumoperitoneum. RETROPERITONEUM: Multiple prominent lymph nodes in the upper abdomen including gastrohepatic ligament, periportal space, and periaortic marylu chains without significant change. REPRODUCTIVE ORGANS: Unremarkable URINARY BLADDER: No mass or calculus. BONES: No destructive bone lesion. ABDOMINAL WALL: No mass or hernia. MR Imaging    MRI Lumbar Spine without contrast 5/12/2014: Alignment: There are 5 nonrib-bearing lumbar type vertebra. There is a normal lumbar lordosis without listhesis. Intervertebral discs: There is L2 and L5 intervertebral disc desiccation with mild height loss at L2 and moderate height loss at the L5 level with mild bulging at L2 and more moderate bulging, anterior extrusion, and dorsal disc protrusion of the L5 disc level.  Vertebral bodies/ marrow: There is endplate Modic type II signal demonstrated at the T12, L2 and L5 levels. A small Schmorl's node is seen at the anterior, superior L1 vertebral level. There is no marrow signal to indicate a marrow replacement process or fracture. Conus and cauda equina: The conus terminates at the T12 level and demonstrates normal signal characteristics. The nerve roots of the cauda equina are normal in appearance. Paravertebral soft tissues: There is no paraspinal mass or fluid collection identified. By level: L1-L2: No canal or neural foraminal stenosis. L2-L3: Mild concentric disc bulge canal or neural foraminal stenosis. L3-L4: There is mild facet osteoarthritis without canal or neural foraminal stenosis. L4-L5: There is a left foraminal disc protrusion without canal or neural foraminal stenosis. Mild facet osteoarthritis is also noted. L5-S1: There is a broad-based left paracentral disc protrusion which is not associated with canal or neural foraminal stenosis. MRI Lumbar Spine without contrast 6/01/2011: Lumbar spine alignment is within normal limits. Chronic Schmorl's node formation in the superior endplates of L1 and L3 and in the inferior  endplate of L5. No compression deformity. Mild multilevel disc desiccation. Conus medullaris terminates at T12-L1. Signal and caliber of the distal cord and conus are within normal limits. Paraspinal soft tissues are within normal limits. T12-L1: facet arthrosis. No herniation or stenosis. L1-L2: Facet arthrosis. No herniation or stenosis. L2-L3: Diffuse disc bulge, facet arthrosis, and thickened ligamentum flavum. Mild central spinal canal stenosis. L3-L4: Diffuse disc bulge, facet arthrosis, and thickened ligamentum flavum. No stenosis. L4-L5: Diffuse disc bulge, facet arthrosis, and thickened ligamentum flavum. No stenosis. L5-S1: Diffuse disc bulge, facet arthrosis, and thickened ligamentum flavum. Mild left foraminal stenosis.      DXA None    PROCEDURE     [facility]  OFFICE PROCEDURE PROGRESS NOTE      Symptom relief from Left Knee Arthritis. (10/29/21)     Chart reviewed for the following:   Christopher Munson MD, have reviewed the History, Physical and updated the Allergic reactions for [address] performed immediately prior to start of procedure:   Christopher Munson MD, have performed the following reviews on [address] [place] [address] prior to the start of the procedure            * Patient was identified by name and date of birth   * Agreement on procedure being performed was verified  * Risks and Benefits explained to the patient  * Procedure site verified and marked as necessary  * Patient was positioned for comfort  * Consent was signed and verified     Time: 10:02 AM    Date of procedure: 10/29/2021    Procedure performed by: Houston Berumen MD    Provider assisted by: self    Patient assisted by: self    How tolerated by patient: tolerated the procedure well with no complications    Post Procedural Pain Scale: 0 - No Hurt    Comments: none    Indications:   Symptom relief from Left Knee. (10/29/2021)     Procedure:   After consent was obtained, and timeout performed, using sterile technique the Left Anterior Medial Knee joint was prepped using Chlorprep. Local anesthetic used: Ethyl Chloride. The joint was entered and 0 ml's of fluid was withdrawn. Kenalog 40 mg was mixed with 1% lidocaine 1 ml and injected into the joint and the needle withdrawn. The procedure was well tolerated. The patient was asked to continue to rest the joint for a few more days before resuming regular activities. It may be more painful for the first 1-2 days. Watch for fever, or increased swelling or persistent pain in the joint. Call or return to clinic as needed if such symptoms occur or there is failure to improve as anticipated. ASSESSMENT AND PLAN    This is a follow-up visit for Ms. Medeiros.     1) Seronegative Rheumatoid Arthritits.      She is maintained on methotrexate 20 mg every Tuesday and Xeljanz 11 mg XR daily, which she has taken with good tolerance. She feels well and denies inflammatory joint symptoms but notes throbbing discomfort in her wrists and hands with the colder rainy weather. Her CDAI was 10 (previously 1, 33) with 3 tender and 1 swollen joints, consistent with moderate disease activity. Her 1st MCP tenderness is due to her De Quervain's tenosynovitis and not articular from Rheumatoid Arthritis. I will continue treatment.    2) Secondary Fibromyalgia. This resolved with treatment for her underlying inflammatory arthritis.    3) Long Term Use of Immunosuppressants. The patient remains on high risk immunomodulatory medications (methotrexate, Dorean Idler) and requires frequent toxicity monitoring by blood work to evaluate for toxicities. Respective labs were ordered (see below orders for details). 4) Patellofemoral Arthralgia. Physical therapy helped her pain.    5) Vitamin D Deficiency. The patient's vitamin D level was 30.2 (previously 35.8, 30.2, 15.4). She is on weekly ergocalciferol 50,000. I will check her level. 6) Chronic Back Pain. Physical therapy resolved this. 7) Left CMC Osteoarthritis/De Quervain's Tenosynovitis. I recommended she follow up with orthopedics. I explained that his is not Rheumatoid Arthritis. 8) Dyslipidemia. Her cholesterol 204 mg/dL and  mg/dL. I will repeat. 9) Acute Left Knee Pain. I injected this with good tolerance. The patient voiced understanding of the aforementioned assessment and plan. A total of 42 minutes was spent on this visit, reviewing interval notes, interval testing results, ordering tests, refilling medications, documenting the findings in the note, patient education, counseling, and coordination of care as described above. All questions asked and answered.  The aforementioned time excluded the injection time.    TODAY'S ORDERS    Orders Placed This Encounter    CBC WITH AUTOMATED DIFF    METABOLIC PANEL, COMPREHENSIVE    C REACTIVE PROTEIN, QT    SED RATE (ESR)    METHOTREXATE POLYGLUTAMATES    VITAMIN D, 25 HYDROXY    LIPID PANEL    TRIAMCINOLONE ACETONIDE INJ    NE DRAIN/INJECT LARGE JOINT/BURSA  - 20610    TRIAMCINOLONE ACETONIDE INJ    NE DRAIN/INJECT LARGE JOINT/BURSA  - 14691    folic acid (FOLVITE) 1 mg tablet    triamcinolone acetonide (Kenalog) 40 mg/mL injection    lidocaine (XYLOCAINE) 10 mg/mL (1 %) injection    triamcinolone acetonide (Kenalog) 40 mg/mL injection    lidocaine (XYLOCAINE) 10 mg/mL (1 %) injection     Future Appointments   Date Time Provider Sonia Amado   1/25/2022  9:00 AM Ori Jacinto MD [facility] BS AMB   10/14/2022  1:30 PM [facility] [place] [address]  [address] [facility], [place], [address] [facility][note 3]    Adult Rheumatology   Rheumatology Ultrasound Certified  [address]  [facility], [address]   Phone [phone]  Fax [phone]

## 2021-10-29 NOTE — LETTER
10/29/2021    Patient: Musa Le   YOB: 1975   Date of Visit: 10/29/2021     Kurtis Benites, [address] [address] [address]  [place]    Dear Kurtis Benites MD,      Thank you for referring Ms. Claudio Roberts to [address] [place] [address] for evaluation. My notes for this consultation are attached. If you have questions, please do not hesitate to call me. I look forward to following your patient along with you.       Sincerely,    Corinne Calix MD

## 2021-11-01 NOTE — PROGRESS NOTES
The results were reviewed. Elevated cholesterol 217 and  --> I recommend lifestyle/diet modification and if no improvement, initiation of cholesterol medication (statin). Elevated ESR 36 mm/hr, CRP 21.9 mg/L. Elevated liver function test (AST 43) --> work on weight loss, likely fatty liver.  Stable anemia (Hct 32.3%)

## 2021-11-11 LAB
METHOTREXATE PG1: 48 NMOL/L RBC
METHOTREXATE PG2: 21 NMOL/L RBC
METHOTREXATE PG3: 38 NMOL/L RBC
METHOTREXATE PG4: 11 NMOL/L RBC
METHOTREXATE PG5: 2 NMOL/L RBC
METHOTREXATE-PG TOTAL: 120 NMOL/L RBC
MTXPGSRA INTERPRETATION: NORMAL

## 2021-12-15 DIAGNOSIS — E55.9 VITAMIN D DEFICIENCY: ICD-10-CM

## 2021-12-15 RX ORDER — ERGOCALCIFEROL 1.25 MG/1
CAPSULE ORAL
Qty: 12 CAPSULE | Refills: 4 | Status: SHIPPED | OUTPATIENT
Start: 2021-12-15 | End: 2022-01-03

## 2021-12-23 ENCOUNTER — TELEPHONE (OUTPATIENT)
Dept: RHEUMATOLOGY | Age: 46
End: 2021-12-23

## 2021-12-23 NOTE — TELEPHONE ENCOUNTER
Pt returned missed call in regards to [facility] leaving. Pt appt rescheduled to sooner appt.

## 2022-01-03 ENCOUNTER — TELEPHONE (OUTPATIENT)
Dept: RHEUMATOLOGY | Age: 47
End: 2022-01-03

## 2022-01-03 ENCOUNTER — VIRTUAL VISIT (OUTPATIENT)
Dept: RHEUMATOLOGY | Age: 47
End: 2022-01-03
Payer: COMMERCIAL

## 2022-01-03 DIAGNOSIS — Z79.60 LONG-TERM USE OF IMMUNOSUPPRESSANT MEDICATION: ICD-10-CM

## 2022-01-03 DIAGNOSIS — M06.09 SERONEGATIVE RHEUMATOID ARTHRITIS OF MULTIPLE SITES (HCC): Primary | ICD-10-CM

## 2022-01-03 DIAGNOSIS — E55.9 VITAMIN D DEFICIENCY: ICD-10-CM

## 2022-01-03 PROCEDURE — 99215 OFFICE O/P EST HI 40 MIN: CPT | Performed by: INTERNAL MEDICINE

## 2022-01-03 RX ORDER — ERGOCALCIFEROL 1.25 MG/1
50000 CAPSULE ORAL
Qty: 12 CAPSULE | Refills: 4 | Status: SHIPPED | OUTPATIENT
Start: 2022-01-03 | End: 2022-08-15 | Stop reason: SDUPTHER

## 2022-01-03 NOTE — PROGRESS NOTES
REASON FOR VISIT    This is a follow-up visit for Ms. Medeiros for     ICD-10-CM   1. Seronegative rheumatoid arthritis of multiple sites [facility])  M06.09     The patient has consented for synchronous (real-time) Telemedicine (audio-video technology) on 1/3/2022 for their care to be delivered over telemedicine in place of their regularly scheduled office visit pursuant to the emergency declaration under the [address] and the [place] [address] waiver authority and the [employer] Act, this Virtual  Visit was conducted, with patient's consent, to reduce the patient's risk of exposure to COVID-19 and provide continuity of care for an established patient. Services were provided through a video synchronous discussion virtually to substitute for in-person clinic visit. Inflammatory arthritis phenotype includes:  Anti-CCP positive: no  Rheumatoid factor positive: no  HLA-B27 positive: no  Erosive disease: N/A  Extra-articular manifestations include: secondary fibromyalgia     Immunosuppression Screening (7/28/2021): Quantiferon TB: negative  PPD:  Not performed  Hepatitis B: negative  Hepatitis C: negative    Therapy History includes:  Current DMARD therapy include: methotrexate 20 mg every Tuesday (10/27/2020 to present), Onetha Hoose 11 mg XR daily (3/05/2021 to present)  Prior DMARD therapy include: methotrexate 15 mg every Tuesday (8/11/2020 to 10/27/2020)  Discontinued DMARDs because of inefficacy: None  Discontinued DMARDs because of side effects: None    HISTORY OF PRESENT ILLNESS    Ms. Nandini Voss returns for a follow-up. On her last visit, I continued methotrexate 20 mg every Tuesday and started Xeljanz 11 mg XR daily, which she has taken with good tolerance. I injected her left knee with good tolerance and relief. Today, she feels okay.  She has issues with her left [facility] and saw Dr. Trae Hernandez who injected her [facility] that helped for a few weeks. Otherwise, she no longer has pain, swelling, or stiffness in her hands or wrists. She has lost a few pounds. She is not working. Most recent toxicity monitoring by blood work was done on 10/29/2021 and did not reveal any significant adverse effects, except AST 43, Hct 32.3%. I reviewed the patient's interval medical history, surgical history, medications, and allergies. The patient has not had any interval hospital admissions, infections, or surgeries. REVIEW OF SYSTEMS    A comprehensive review of systems was performed and pertinent results are documented in the HPI, review of systems is otherwise non-contributory. PAST MEDICAL HISTORY    She has a past medical history of DDD (degenerative disc disease), lumbosacral, Migraine, Osteoarthritis, Renal stones, Sciatica, and Tubular adenoma of colon (6/23/2020). FAMILY HISTORY    Her family history includes Arthritis-rheumatoid in her father; Gout in her brother and mother; No Known Problems in her sister, son, and another family member. SOCIAL HISTORY    She reports that she quit smoking about 7 years ago. She smoked 0.50 packs per day. She has never used smokeless tobacco. She reports previous alcohol use of about 3.3 standard drinks of alcohol per week. She reports that she does not use drugs. MEDICATIONS    Current Outpatient Medications   Medication Sig    ergocalciferol (ERGOCALCIFEROL) 1,250 mcg (50,000 unit) capsule Take 1 Capsule by mouth every fourteen (14) days.  tofacitinib 11 mg Tb24 Take 11 mg by mouth daily. Indications: rheumatoid arthritis    folic acid (FOLVITE) 1 mg tablet Take 1 Tablet by mouth daily.  methotrexate (RHEUMATREX) 2.5 mg tablet Take 8 Tablets by mouth every Tuesday. No current facility-administered medications for this visit.      ALLERGIES    Allergies   Allergen Reactions    Sulfa (Sulfonamide Antibiotics) Rash       PHYSICAL EXAMINATION    There were no vitals taken for this visit.  There is no height or weight on file to calculate BMI. General: Patient is alert, oriented x 3, not in acute distress    HEENT:   Sclerae are not injected and appear moist.  There is no alopecia. Chest:  Breathing comfortably at room air    Musculoskeletal exam:  A comprehensive musculoskeletal exam was NOT performed for all joints of each upper and lower extremity and assessed for swelling, tenderness and range of motion.  Positive results are documented as below:    Previous exam    Left knee mild swelling with warmth  Bilateral patellar laxity      Z-Deformities:   no  [place]ck Deformities:  no  Boutonierre's Deformities:  no  Ulnar Deviation:   no  MCP Subluxation:  no    Joint Count 10/29/2021 10/27/2020 7/31/2020   Patient pain (0-100) 50 10 60   MHAQ 1 0.5 0.875   Left wrist- Tender 1 0 1   Left wrist- Swollen 0 0 0   Left 1st MCP - Tender - - 1   Left 1st MCP - Swollen - - 0   Left 2nd PIP - Tender - - 1   Left 2nd PIP - Swollen - - 1   Left 3rd PIP - Tender - - 1   Left 3rd PIP - Swollen - - 1   Left 4th PIP - Tender - - 1   Left 4th PIP - Swollen - - 0   Left 5th PIP - Tender - - 1   Left 5th PIP - Swollen - - 0   Left knee - Tender 1 - -   Left knee - Swollen 1 - -   Right wrist- Tender 1 - 1   Right wrist- Swollen 0 - 1   Right 1st MCP - Tender - - 1   Right 1st MCP - Swollen - - 1   Right 3rd MCP - Tender - - 1   Right 3rd MCP - Swollen - - 1   Right 4th MCP - Tender - - 1   Right 4th MCP - Swollen - - 0   Right 5th MCP - Tender - - 1   Right 5th MCP - Swollen - - 0   Right 2nd PIP - Tender - - 1   Right 2nd PIP - Swollen - - 1   Right 3rd PIP - Tender - - 1   Right 3rd PIP - Swollen - - 1   Right 4th PIP - Tender - - 1   Right 4th PIP - Swollen - - 1   Tender Joint Count (Total) 3 0 14   Swollen Joint Count (Total) 1 0 8   Physician Assessment (0-10) 1 0 3   Patient Assessment (0-10) 5 1 8   CDAI Total (calculated) 10 1 33       DATA REVIEW    Laboratory     Recent laboratory results were reviewed, summarized, and discussed with the patient. Imaging    Musculoskeletal Ultrasound    None    Radiographs    Bilateral Knee 6/16/2013: overall alignment is normal. The joint spaces are preserved and only minimal arthritic changes are present, with tiny osteophytes present at the margins of the femorotibial compartments bilaterally. There is no evidence of joint effusion, erosions, or other features to suggest inflammatory arthritis. Bilateral hand radiograph showed Alignment and bone mineralization is normal bilaterally. No significant arthritic changes are present. CT Imaging    CT Abdomen and PElvis without contrast 9/28/2020: LOWER THORAX: No significant abnormality in the incidentally imaged lower chest. LIVER: No mass. BILIARY TREE: Gallbladder is within normal limits. CBD is not dilated. SPLEEN: within normal limits. PANCREAS: No focal abnormality. ADRENALS: Unremarkable. KIDNEYS/URETERS: Small nonobstructing bilateral renal stones. STOMACH: Unremarkable. SMALL BOWEL: No dilatation or wall thickening. COLON: No dilatation or wall thickening. APPENDIX: Unremarkable. PERITONEUM: No ascites or pneumoperitoneum. RETROPERITONEUM: Multiple prominent lymph nodes in the upper abdomen including gastrohepatic ligament, periportal space, and periaortic marylu chains without significant change. REPRODUCTIVE ORGANS: Unremarkable URINARY BLADDER: No mass or calculus. BONES: No destructive bone lesion. ABDOMINAL WALL: No mass or hernia. MR Imaging    MRI Lumbar Spine without contrast 5/12/2014: Alignment: There are 5 nonrib-bearing lumbar type vertebra. There is a normal lumbar lordosis without listhesis. Intervertebral discs: There is L2 and L5 intervertebral disc desiccation with mild height loss at L2 and moderate height loss at the L5 level with mild bulging at L2 and more moderate bulging, anterior extrusion, and dorsal disc protrusion of the L5 disc level. Vertebral bodies/ marrow:  There is endplate Modic type II signal demonstrated at the T12, L2 and L5 levels. A small Schmorl's node is seen at the anterior, superior L1 vertebral level. There is no marrow signal to indicate a marrow replacement process or fracture. Conus and cauda equina: The conus terminates at the T12 level and demonstrates normal signal characteristics. The nerve roots of the cauda equina are normal in appearance. Paravertebral soft tissues: There is no paraspinal mass or fluid collection identified. By level: L1-L2: No canal or neural foraminal stenosis. L2-L3: Mild concentric disc bulge canal or neural foraminal stenosis. L3-L4: There is mild facet osteoarthritis without canal or neural foraminal stenosis. L4-L5: There is a left foraminal disc protrusion without canal or neural foraminal stenosis. Mild facet osteoarthritis is also noted. L5-S1: There is a broad-based left paracentral disc protrusion which is not associated with canal or neural foraminal stenosis. MRI Lumbar Spine without contrast 6/01/2011: Lumbar spine alignment is within normal limits. Chronic Schmorl's node formation in the superior endplates of L1 and L3 and in the inferior  endplate of L5. No compression deformity. Mild multilevel disc desiccation. Conus medullaris terminates at T12-L1. Signal and caliber of the distal cord and conus are within normal limits. Paraspinal soft tissues are within normal limits. T12-L1: facet arthrosis. No herniation or stenosis. L1-L2: Facet arthrosis. No herniation or stenosis. L2-L3: Diffuse disc bulge, facet arthrosis, and thickened ligamentum flavum. Mild central spinal canal stenosis. L3-L4: Diffuse disc bulge, facet arthrosis, and thickened ligamentum flavum. No stenosis. L4-L5: Diffuse disc bulge, facet arthrosis, and thickened ligamentum flavum. No stenosis. L5-S1: Diffuse disc bulge, facet arthrosis, and thickened ligamentum flavum. Mild left foraminal stenosis.      DXA     None    PROCEDURE     Left Knee Kenalog 40 mg IA. (10/29/2021)    ASSESSMENT AND PLAN    This is a follow-up visit for Ms. Medeiros. 1) Seronegative Rheumatoid Arthritits.      She is maintained on methotrexate 20 mg every Tuesday and Xeljanz 11 mg XR daily, which she has taken with good tolerance. She feels well. She denies inflammatory joint symptoms. Her CDAI was 10 (previously 1, 33) with 3 tender and 1 swollen joints, consistent with moderate disease activity. Her 1st MCP tenderness was due to her De Quervain's tenosynovitis and not articular from Rheumatoid Arthritis. I will continue treatment.    2) Secondary Fibromyalgia. This resolved with treatment for her underlying inflammatory arthritis.    3) Long Term Use of Immunosuppressants. The patient remains on high risk immunomodulatory medications (methotrexate, Trygve Emmie) and requires frequent toxicity monitoring by blood work to evaluate for toxicities. Respective labs were ordered (see below orders for details). 4) Patellofemoral Arthralgia. Physical therapy helped her pain.    5) Vitamin D Deficiency. The patient's vitamin D level was 58.2 (previously 30.2, 35.8, 30.2, 15.4). She is on weekly ergocalciferol 50,000. I will change to biweekly. 6) Chronic Back Pain. Physical therapy resolved this. 7) Left CMC Osteoarthritis/De Quervain's Tenosynovitis. I recommended she follow up with orthopedics. I explained that his is not Rheumatoid Arthritis. 8) Dyslipidemia. Her cholesterol 217 mg/dL and LDL 148mg/dL. I recommend lifestyle modification with weight loss. If no improvement, then addition of statin would be appropriate. 9) Acute Left Knee Pain. I had injected this with good tolerance and improvement. This resolved. 10) Elevated AST. I recommended weight loss. The patient voiced understanding of the aforementioned assessment and plan.      The patient has consented for synchronous (real-time) Telemedicine (audio-video technology) on 1/3/2022 for their care to be delivered over telemedicine in place of their regularly scheduled office visit pursuant to the emergency declaration under the [facility], [facility] waiver authority and the [employer] Act, this Virtual  Visit was conducted, with patient's consent, to reduce the patient's risk of exposure to COVID-19 and provide continuity of care for an established patient. A total of 45 minutes was spent on this visit, reviewing interval notes, interval testing results, ordering tests, refilling medications, documenting the findings in the note, patient education, counseling, and coordination of care as described above. All questions asked and answered. Services were provided through a video synchronous discussion virtually to substitute for in-person clinic visit. TODAY'S ORDERS    Orders Placed This Encounter    CBC WITH AUTOMATED DIFF    METABOLIC PANEL, COMPREHENSIVE    ergocalciferol (ERGOCALCIFEROL) 1,250 mcg (50,000 unit) capsule    tofacitinib 11 mg Tb24     Future Appointments   Date Time Provider Sonia Amado   4/4/2022  8:30 AM Charlynn Lanes, MD [facility] BS AMB   10/14/2022  1:30 PM [facility] [address] [facility].  [address], [place], [address]    Adult Rheumatology   Rheumatology Ultrasound Certified  [address]  [place], [address]   Phone [phone]  Fax [phone]

## 2022-01-11 LAB
ALBUMIN SERPL-MCNC: 4.4 G/DL (ref 3.8–4.8)
ALBUMIN/GLOB SERPL: 1.7 {RATIO} (ref 1.2–2.2)
ALP SERPL-CCNC: 110 IU/L (ref 44–121)
ALT SERPL-CCNC: 18 IU/L (ref 0–32)
AST SERPL-CCNC: 28 IU/L (ref 0–40)
BASOPHILS # BLD AUTO: 0 X10E3/UL (ref 0–0.2)
BASOPHILS NFR BLD AUTO: 1 %
BILIRUB SERPL-MCNC: 0.4 MG/DL (ref 0–1.2)
BUN SERPL-MCNC: 7 MG/DL (ref 6–24)
BUN/CREAT SERPL: 10 (ref 9–23)
CALCIUM SERPL-MCNC: 9.3 MG/DL (ref 8.7–10.2)
CHLORIDE SERPL-SCNC: 102 MMOL/L (ref 96–106)
CO2 SERPL-SCNC: 24 MMOL/L (ref 20–29)
CREAT SERPL-MCNC: 0.73 MG/DL (ref 0.57–1)
EOSINOPHIL # BLD AUTO: 0.1 X10E3/UL (ref 0–0.4)
EOSINOPHIL NFR BLD AUTO: 1 %
ERYTHROCYTE [DISTWIDTH] IN BLOOD BY AUTOMATED COUNT: 17 % (ref 11.7–15.4)
GLOBULIN SER CALC-MCNC: 2.6 G/DL (ref 1.5–4.5)
GLUCOSE SERPL-MCNC: 95 MG/DL (ref 65–99)
HCT VFR BLD AUTO: 32.7 % (ref 34–46.6)
HGB BLD-MCNC: 10.2 G/DL (ref 11.1–15.9)
IMM GRANULOCYTES # BLD AUTO: 0 X10E3/UL (ref 0–0.1)
IMM GRANULOCYTES NFR BLD AUTO: 0 %
LYMPHOCYTES # BLD AUTO: 0.9 X10E3/UL (ref 0.7–3.1)
LYMPHOCYTES NFR BLD AUTO: 15 %
MCH RBC QN AUTO: 27.7 PG (ref 26.6–33)
MCHC RBC AUTO-ENTMCNC: 31.2 G/DL (ref 31.5–35.7)
MCV RBC AUTO: 89 FL (ref 79–97)
MONOCYTES # BLD AUTO: 0.3 X10E3/UL (ref 0.1–0.9)
MONOCYTES NFR BLD AUTO: 5 %
NEUTROPHILS # BLD AUTO: 5 X10E3/UL (ref 1.4–7)
NEUTROPHILS NFR BLD AUTO: 78 %
PLATELET # BLD AUTO: 253 X10E3/UL (ref 150–450)
POTASSIUM SERPL-SCNC: 4.5 MMOL/L (ref 3.5–5.2)
PROT SERPL-MCNC: 7 G/DL (ref 6–8.5)
RBC # BLD AUTO: 3.68 X10E6/UL (ref 3.77–5.28)
SODIUM SERPL-SCNC: 139 MMOL/L (ref 134–144)
WBC # BLD AUTO: 6.3 X10E3/UL (ref 3.4–10.8)

## 2022-03-01 DIAGNOSIS — M06.4 UNDIFFERENTIATED INFLAMMATORY ARTHRITIS (HCC): ICD-10-CM

## 2022-03-01 RX ORDER — METHOTREXATE 2.5 MG/1
TABLET ORAL
Qty: 96 TABLET | Refills: 1 | OUTPATIENT
Start: 2022-03-01

## 2022-03-09 DIAGNOSIS — M06.4 UNDIFFERENTIATED INFLAMMATORY ARTHRITIS (HCC): ICD-10-CM

## 2022-03-09 NOTE — TELEPHONE ENCOUNTER
Patient called stating she has an upcoming appointment with Dr Jacy Ratliff on April 4 but would need a refill of her methotrexate. I do see that it says 1 ordered but I don't know if that means there is one refill left. Her call back number is [phone].

## 2022-03-11 RX ORDER — METHOTREXATE 2.5 MG/1
20 TABLET ORAL
Qty: 96 TABLET | Refills: 0 | Status: SHIPPED | OUTPATIENT
Start: 2022-03-15 | End: 2022-05-31 | Stop reason: SDUPTHER

## 2022-03-18 PROBLEM — M06.4 UNDIFFERENTIATED INFLAMMATORY ARTHRITIS (HCC): Status: ACTIVE | Noted: 2020-10-27

## 2022-03-18 PROBLEM — Z79.60 LONG-TERM USE OF IMMUNOSUPPRESSANT MEDICATION: Status: ACTIVE | Noted: 2020-10-27

## 2022-03-18 PROBLEM — M22.2X1 PATELLOFEMORAL ARTHRALGIA OF BOTH KNEES: Status: ACTIVE | Noted: 2020-10-27

## 2022-03-18 PROBLEM — M19.90 UNDIFFERENTIATED INFLAMMATORY ARTHRITIS: Status: ACTIVE | Noted: 2020-10-27

## 2022-03-18 PROBLEM — M22.2X2 PATELLOFEMORAL ARTHRALGIA OF BOTH KNEES: Status: ACTIVE | Noted: 2020-10-27

## 2022-03-18 PROBLEM — M06.09 SERONEGATIVE RHEUMATOID ARTHRITIS OF MULTIPLE SITES (HCC): Status: ACTIVE | Noted: 2022-01-03

## 2022-03-18 PROBLEM — D12.6 TUBULAR ADENOMA OF COLON: Status: ACTIVE | Noted: 2020-06-23

## 2022-03-18 PROBLEM — M79.7 SECONDARY FIBROMYALGIA: Status: ACTIVE | Noted: 2020-10-27

## 2022-03-19 PROBLEM — E66.01 OBESITY, MORBID (HCC): Status: ACTIVE | Noted: 2020-06-24

## 2022-03-19 PROBLEM — E55.9 VITAMIN D DEFICIENCY: Status: ACTIVE | Noted: 2020-10-27

## 2022-04-04 ENCOUNTER — OFFICE VISIT (OUTPATIENT)
Dept: RHEUMATOLOGY | Age: 47
End: 2022-04-04
Payer: COMMERCIAL

## 2022-04-04 VITALS
WEIGHT: 240.2 LBS | BODY MASS INDEX: 41.01 KG/M2 | DIASTOLIC BLOOD PRESSURE: 82 MMHG | OXYGEN SATURATION: 98 % | RESPIRATION RATE: 20 BRPM | SYSTOLIC BLOOD PRESSURE: 131 MMHG | HEIGHT: 64 IN | HEART RATE: 74 BPM | TEMPERATURE: 98 F

## 2022-04-04 DIAGNOSIS — Z79.899 ONGOING USE OF POSSIBLY TOXIC MEDICATION: ICD-10-CM

## 2022-04-04 DIAGNOSIS — M06.09 SERONEGATIVE RHEUMATOID ARTHRITIS OF MULTIPLE SITES (HCC): Primary | ICD-10-CM

## 2022-04-04 DIAGNOSIS — E78.2 MIXED HYPERLIPIDEMIA: ICD-10-CM

## 2022-04-04 PROCEDURE — 99215 OFFICE O/P EST HI 40 MIN: CPT | Performed by: INTERNAL MEDICINE

## 2022-04-04 NOTE — PROGRESS NOTES
54yo with seronegative rheumatoid arthritis and secondary osteoarthritis without active inflammatory disease on the regimen of methotrexate and tofacitinib. Reviewed the increased risk for major cardiovascular events and DVT on tofacitinib, but given her symptomatic improvement with this she prefers to continue it. Recent increase in pain due to mechanical difficulties s/p recent left wrist surgery, will continue current immunosuppression without modification.

## 2022-04-04 NOTE — Clinical Note
4/4/2022    Patient: Hussein Mendenhall   YOB: 1975   Date of Visit: 4/4/2022     Eligio Moulton, [address] [place] [address] [address]  [address] [facility] [address]    Dear Eligio Moulton MD,      Thank you for referring Ms. Sameer Martinez to [address] [place] [address] for evaluation. My notes for this consultation are attached. If you have questions, please do not hesitate to call me. I look forward to following your patient along with you.       Sincerely,    Carrie Main MD

## 2022-04-04 NOTE — PROGRESS NOTES
REASON FOR VISIT    This is a new Rheumatology provider follow-up visit for Ms. Medeiros for     ICD-10-CM   1. Seronegative rheumatoid [facility] sites ([facility]  M06.09     Inflammatory arthritis phenotype includes:  Anti-CCP positive: no  Rheumatoid factor positive: no  HLA-B27 positive: no  Erosive disease: N/A  Extra-articular manifestations include: secondary fibromyalgia     Immunosuppression Screening (7/28/2021): Quantiferon TB: negative  PPD:  Not performed  Hepatitis B: negative  Hepatitis C: negative    Therapy History includes:  Current DMARD therapy include: methotrexate 20 mg every Tuesday (10/27/2020 to present), Alondra Cava 11 mg XR daily (3/05/2021 to present)  Prior DMARD therapy include: methotrexate 15 mg every Tuesday (8/11/2020 to 10/27/2020)  Discontinued DMARDs because of inefficacy: None  Discontinued DMARDs because of side effects: None    HISTORY OF PRESENT ILLNESS  Ms. Nika Mendoza returns for a follow-up. Pt is a former Dr. Michael Fitzpatrick pt. The pt reports her joints in her ankles, R shoulder, R knee, and R wrist seem to be progressing, and they are causing her significant difficulty and pain with walking. Pt reports taking xeljanz every day and Methotrexate 8 pills per week. Denies any GI side effects. Pt had recent L CMC joint surgery Pt denies recent infections. Pt was told to take acetaminophen and ibuprofen for pain. She takes these medications once or twice a week but not very often. Pt has been following up with physical therapy. The pt quit drinking EtOH    Pt has lost 18 pounds since her last summer. She is unsure of the cause for this, she has continued to attempt weight loss through avoidance of sweets and more activity, though these efforts are longerstanding and were previously unsuccessful. The pt admits to having nondrenching night sweats but thinks that it is a part of menopause, has had irregular menstrual periods for over 10 years.     REVIEW OF SYSTEMS    A comprehensive review of systems was performed and pertinent results are documented in the HPI, review of systems is otherwise non-contributory. PAST MEDICAL HISTORY    She has a past medical history of DDD (degenerative disc disease), lumbosacral, Migraine, Osteoarthritis, Renal stones, Sciatica, and Tubular adenoma of colon (6/23/2020). FAMILY HISTORY    Her family history includes Arthritis-rheumatoid in her father; Gout in her brother and mother; No Known Problems in her sister, son, and another family member. SOCIAL HISTORY    She reports that she quit smoking about 7 years ago. She smoked 0.50 packs per day. She has never used smokeless tobacco. She reports previous alcohol use of about 3.3 standard drinks of alcohol per week. She reports that she does not use drugs. MEDICATIONS    Current Outpatient Medications   Medication Sig    methotrexate (RHEUMATREX) 2.5 mg tablet Take 8 Tablets by mouth every Tuesday.  ergocalciferol (ERGOCALCIFEROL) 1,250 mcg (50,000 unit) capsule Take 1 Capsule by mouth every fourteen (14) days.  tofacitinib 11 mg Tb24 Take 11 mg by mouth daily. Indications: rheumatoid arthritis    folic acid (FOLVITE) 1 mg tablet Take 1 Tablet by mouth daily. No current facility-administered medications for this visit. ALLERGIES    Allergies   Allergen Reactions    Sulfa (Sulfonamide Antibiotics) Rash       PHYSICAL EXAMINATION    Visit Vitals  /82 (BP 1 Location: Right arm, BP Patient Position: Sitting)   Pulse 74   Temp 98 °F (36.7 °C) (Oral)   Resp 20   Ht 5' 4\" (1.626 m)   Wt 240 lb 3.2 oz (109 kg)   SpO2 98%   BMI 41.23 kg/m²     Body mass index is 41.23 kg/m². General:  The patient is well developed, well nourished, alert, and in no apparent distress. Eyes: Sclera are anicteric. No conjunctival injection. HEENT:  Oropharynx is clear. No oral ulcers. Adequate salivary pooling. No cervical or supraclavicular lymphadenopathy.    Lungs:  Clear to auscultation bilaterally, without wheeze or stridor. Normal respiratory effort. Cor:  Regular rate and rhythm. No murmur rub or gallop. Abdomen: Soft, non-tender, without hepatomegaly or masses. Extremities: No calf tenderness. Warm and well perfused. Trace edema in L post surgical hand. Bilateral edema in lower distal extremeties. Skin: Post surgical L CMC scar, healing well. Neuro: Nonfocal  Musculoskeletal:  Crepitus in bilateral shoulders. Post surgical L CMC and volar scars well-apposed without induration; trace edema in L post surgical hand but no synovitis. DATA REVIEW    Laboratory   1/10/22: Cr 0.73, LFT WNL, WBC 6.3, HGB 10.2, Plt 253,   Recent laboratory results were reviewed, summarized, and discussed with the patient. 10/29/21: CRP 2.19, ESR 36    Imaging    Musculoskeletal Ultrasound    None    Radiographs    Bilateral Knee 6/16/2013: overall alignment is normal. The joint spaces are preserved and only minimal arthritic changes are present, with tiny osteophytes present at the margins of the femorotibial compartments bilaterally. There is no evidence of joint effusion, erosions, or other features to suggest inflammatory arthritis. Bilateral hand radiograph showed Alignment and bone mineralization is normal bilaterally. No significant arthritic changes are present. CT Imaging    CT Abdomen and PElvis without contrast 9/28/2020: LOWER THORAX: No significant abnormality in the incidentally imaged lower chest. LIVER: No mass. BILIARY TREE: Gallbladder is within normal limits. CBD is not dilated. SPLEEN: within normal limits. PANCREAS: No focal abnormality. ADRENALS: Unremarkable. KIDNEYS/URETERS: Small nonobstructing bilateral renal stones. STOMACH: Unremarkable. SMALL BOWEL: No dilatation or wall thickening. COLON: No dilatation or wall thickening. APPENDIX: Unremarkable. PERITONEUM: No ascites or pneumoperitoneum.  RETROPERITONEUM: Multiple prominent lymph nodes in the upper abdomen including gastrohepatic ligament, periportal space, and periaortic marylu chains without significant change. REPRODUCTIVE ORGANS: Unremarkable URINARY BLADDER: No mass or calculus. BONES: No destructive bone lesion. ABDOMINAL WALL: No mass or hernia. MR Imaging    MRI Lumbar Spine without contrast 5/12/2014: Alignment: There are 5 nonrib-bearing lumbar type vertebra. There is a normal lumbar lordosis without listhesis. Intervertebral discs: There is L2 and L5 intervertebral disc desiccation with mild height loss at L2 and moderate height loss at the L5 level with mild bulging at L2 and more moderate bulging, anterior extrusion, and dorsal disc protrusion of the L5 disc level. Vertebral bodies/ marrow: There is endplate Modic type II signal demonstrated at the T12, L2 and L5 levels. A small Schmorl's node is seen at the anterior, superior L1 vertebral level. There is no marrow signal to indicate a marrow replacement process or fracture. Conus and cauda equina: The conus terminates at the T12 level and demonstrates normal signal characteristics. The nerve roots of the cauda equina are normal in appearance. Paravertebral soft tissues: There is no paraspinal mass or fluid collection identified. By level: L1-L2: No canal or neural foraminal stenosis. L2-L3: Mild concentric disc bulge canal or neural foraminal stenosis. L3-L4: There is mild facet osteoarthritis without canal or neural foraminal stenosis. L4-L5: There is a left foraminal disc protrusion without canal or neural foraminal stenosis. Mild facet osteoarthritis is also noted. L5-S1: There is a broad-based left paracentral disc protrusion which is not associated with canal or neural foraminal stenosis. MRI Lumbar Spine without contrast 6/01/2011: Lumbar spine alignment is within normal limits. Chronic Schmorl's node formation in the superior endplates of L1 and L3 and in the inferior  endplate of L5. No compression deformity.  Mild multilevel disc desiccation. Conus medullaris terminates at T12-L1. Signal and caliber of the distal cord and conus are within normal limits. Paraspinal soft tissues are within normal limits. T12-L1: facet arthrosis. No herniation or stenosis. L1-L2: Facet arthrosis. No herniation or stenosis. L2-L3: Diffuse disc bulge, facet arthrosis, and thickened ligamentum flavum. Mild central spinal canal stenosis. L3-L4: Diffuse disc bulge, facet arthrosis, and thickened ligamentum flavum. No stenosis. L4-L5: Diffuse disc bulge, facet arthrosis, and thickened ligamentum flavum. No stenosis. L5-S1: Diffuse disc bulge, facet arthrosis, and thickened ligamentum flavum. Mild left foraminal stenosis. DXA     None    PROCEDURE     Left Knee Kenalog 40 mg IA. (10/29/2021)    ASSESSMENT AND PLAN  45yo with seronegative rheumatoid arthritis and secondary osteoarthritis without active inflammatory disease on the regimen of methotrexate and tofacitinib. Reviewed the increased risk for major cardiovascular events and DVT on tofacitinib, but given her symptomatic improvement with this she prefers to continue it. Recent increase in pain due to mechanical difficulties s/p recent left wrist surgery, will continue current immunosuppression without modification. 1. Seronegative rheumatoid arthritis of multiple sites (HCC)  - Cont methotrexate 20mg weekly, daily folic acid, and tofacitinib 11mg daily  - C REACTIVE PROTEIN, QT; Future  - CBC WITH AUTOMATED DIFF; Future  - METABOLIC PANEL, COMPREHENSIVE; Future  - SED RATE (ESR); Future  - LIPID PANEL; Future    2. Ongoing use of possibly toxic medication  - CBC WITH AUTOMATED DIFF; Future  - METABOLIC PANEL, COMPREHENSIVE; Future  - LIPID PANEL; Future    3. Mixed hyperlipidemia  - LIPID PANEL; Future       Patient Instructions   1) Continue xeljans daily and continue with 8 pills of methotrexate once per week along with daily folic acid. 2) Repeat labs in the next month.  You should get them done about every 3 months. These will be fasting labs. 3)Remember there is an increased risk for bloodclots and heart attacks when you are on xeljans. If you are going to be stationary for more than 5 hours at a time take a baby aspirin for long trips. 4) Continue with post-surgical physical therapy and stretches. 5) Follow up in 4 months. Message me if you have any concerns or flare ups. TODAY'S ORDERS    Orders Placed This Encounter    C REACTIVE PROTEIN, QT    CBC WITH AUTOMATED DIFF    METABOLIC PANEL, COMPREHENSIVE    SED RATE (ESR)    LIPID PANEL     Future Appointments   Date Time Provider Sonia Amado   10/14/2022  1:30 PM [facility] [address]  [facility]     Face to face time:25  minutes  Note preparation and records review day of service: 22 minutes  Total provider time day of service: 47 minutes    This was scribed by Luiz Reyez in the presence of Dr. Edmund Mccullough MD    Adult Rheumatology   [address]  [facility], [address] [facility]   Phone [phone]  Fax [phone]

## 2022-04-04 NOTE — PATIENT INSTRUCTIONS
1) Continue xeljans daily and continue with 8 pills of methotrexate once per week along with daily folic acid. 2) Repeat labs in the next month. You should get them done about every 3 months. These will be fasting labs. 3)Remember there is an increased risk for bloodclots and heart attacks when you are on xeljans. If you are going to be stationary for more than 5 hours at a time take a baby aspirin for long trips. 4) Continue with post-surgical physical therapy and stretches. 5) Follow up in 4 months. Message me if you have any concerns or flare ups.

## 2022-04-04 NOTE — PROGRESS NOTES
Chief Complaint   Patient presents with    Arthritis     knees, ankles, wrist     1. Have you been to the ER, urgent care clinic since your last visit? Hospitalized since your last visit? No    2. Have you seen or consulted any other health care providers outside of the [address] [place] [address] since your last visit? Include any pap smears or colon screening. Yes Where: [place]. Connie Lemons[place] [facility] LOS 3 DAYS  RISK OF UNPLANNED READMISSION SCORE 33  30 DAY READMISSION: YES  Patient was at [facility] from 8/30/2020-9/8/2020 where patient presented with SOB and cough, patient was discharged to [facility]  Patient returned to [facility] on 9/18/2020 due to low BP and O2 saturations with SOB - patient was sent by [facility]  BUNDLE: [facility] LOS 12-15    CM s/w patient's wife, Tripp Marr, on the phone  Patient lives in a 1-story home with 4 ALEJANDRO  Patient has recently been using a RW to ambulate and also has a cane, no other DME use identified  Patient is not on O2 chronic, h    [facility] [place]  STR Hx at [facility]  MH Dxs including PTSD, OCD, and MDD  Patient's symptoms are managed via Rx through PCP  PCP: Keyonna Landin with [facility]  Preferred Pharmacy: [facility], no barriers identified to obtaining Rx from that location  HCR is wife, Tripp Marr  Patient is in process of filling out formal POA  Patient was provided with copy of paperwork during last admission  CM reviewed discharge planning process including the following: identifying help at home, patient preference for discharge planning needs, pharmacy preference, and availability of treatment team to discuss questions or concerns patient and/or family may have regarding understanding medications and recognizing signs and symptoms once discharged  CM also encouraged patient to follow up with all recommended appointments after discharge  Patient advised of importance for patient and family to participate in managing patients medical well being  CM name and role reviewed  Discharge Checklist reviewed and CM will continue to monitor for progress toward discharge goals in nursing and provider rounds  Per wife, preference is to take patient home when cleared for discharge and resume services with [facility]   CM pended referral on [facility] and will follow-up  CM department to follow through discharge

## 2022-04-12 ENCOUNTER — HOSPITAL ENCOUNTER (EMERGENCY)
Age: 47
Discharge: HOME | End: 2022-04-12
Attending: EMERGENCY MEDICINE
Payer: COMMERCIAL

## 2022-04-12 ENCOUNTER — APPOINTMENT (OUTPATIENT)
Dept: ULTRASOUND IMAGING | Age: 47
End: 2022-04-12
Attending: PHYSICIAN ASSISTANT
Payer: COMMERCIAL

## 2022-04-12 ENCOUNTER — APPOINTMENT (OUTPATIENT)
Dept: CT IMAGING | Age: 47
End: 2022-04-12
Attending: PHYSICIAN ASSISTANT
Payer: COMMERCIAL

## 2022-04-12 VITALS
BODY MASS INDEX: 40.97 KG/M2 | HEART RATE: 61 BPM | SYSTOLIC BLOOD PRESSURE: 109 MMHG | TEMPERATURE: 97.1 F | RESPIRATION RATE: 16 BRPM | DIASTOLIC BLOOD PRESSURE: 69 MMHG | WEIGHT: 240 LBS | HEIGHT: 64 IN | OXYGEN SATURATION: 100 %

## 2022-04-12 DIAGNOSIS — R10.811 RIGHT UPPER QUADRANT ABDOMINAL TENDERNESS WITHOUT REBOUND TENDERNESS: ICD-10-CM

## 2022-04-12 DIAGNOSIS — R10.813 RIGHT LOWER QUADRANT ABDOMINAL TENDERNESS WITHOUT REBOUND TENDERNESS: ICD-10-CM

## 2022-04-12 DIAGNOSIS — R10.9 RIGHT SIDED ABDOMINAL PAIN: Primary | ICD-10-CM

## 2022-04-12 LAB
ALBUMIN SERPL-MCNC: 3.7 G/DL (ref 3.5–5)
ALBUMIN/GLOB SERPL: 0.9 {RATIO} (ref 1.1–2.2)
ALP SERPL-CCNC: 111 U/L (ref 45–117)
ALT SERPL-CCNC: 19 U/L (ref 12–78)
ANION GAP SERPL CALC-SCNC: 7 MMOL/L (ref 5–15)
APPEARANCE UR: CLEAR
AST SERPL-CCNC: 27 U/L (ref 15–37)
BACTERIA URNS QL MICRO: NEGATIVE /HPF
BASOPHILS # BLD: 0 K/UL (ref 0–0.1)
BASOPHILS NFR BLD: 0 % (ref 0–1)
BILIRUB SERPL-MCNC: 0.3 MG/DL (ref 0.2–1)
BILIRUB UR QL: NEGATIVE
BUN SERPL-MCNC: 12 MG/DL (ref 6–20)
BUN/CREAT SERPL: 13 (ref 12–20)
CALCIUM SERPL-MCNC: 9.4 MG/DL (ref 8.5–10.1)
CHLORIDE SERPL-SCNC: 105 MMOL/L (ref 97–108)
CLUE CELLS VAG QL WET PREP: NORMAL
CO2 SERPL-SCNC: 27 MMOL/L (ref 21–32)
COLOR UR: ABNORMAL
CREAT SERPL-MCNC: 0.96 MG/DL (ref 0.55–1.02)
DIFFERENTIAL METHOD BLD: ABNORMAL
EOSINOPHIL # BLD: 0 K/UL (ref 0–0.4)
EOSINOPHIL NFR BLD: 1 % (ref 0–7)
EPITH CASTS URNS QL MICRO: ABNORMAL /LPF
ERYTHROCYTE [DISTWIDTH] IN BLOOD BY AUTOMATED COUNT: 16.8 % (ref 11.5–14.5)
GLOBULIN SER CALC-MCNC: 4.1 G/DL (ref 2–4)
GLUCOSE SERPL-MCNC: 88 MG/DL (ref 65–100)
GLUCOSE UR STRIP.AUTO-MCNC: NEGATIVE MG/DL
HCG UR QL: NEGATIVE
HCT VFR BLD AUTO: 35.1 % (ref 35–47)
HGB BLD-MCNC: 11.1 G/DL (ref 11.5–16)
HGB UR QL STRIP: NEGATIVE
IMM GRANULOCYTES # BLD AUTO: 0 K/UL (ref 0–0.04)
IMM GRANULOCYTES NFR BLD AUTO: 0 % (ref 0–0.5)
KETONES UR QL STRIP.AUTO: NEGATIVE MG/DL
KOH PREP SPEC: NORMAL
LEUKOCYTE ESTERASE UR QL STRIP.AUTO: ABNORMAL
LIPASE SERPL-CCNC: 82 U/L (ref 73–393)
LYMPHOCYTES # BLD: 0.9 K/UL (ref 0.8–3.5)
LYMPHOCYTES NFR BLD: 16 % (ref 12–49)
MCH RBC QN AUTO: 29.2 PG (ref 26–34)
MCHC RBC AUTO-ENTMCNC: 31.6 G/DL (ref 30–36.5)
MCV RBC AUTO: 92.4 FL (ref 80–99)
MONOCYTES # BLD: 0.4 K/UL (ref 0–1)
MONOCYTES NFR BLD: 7 % (ref 5–13)
NEUTS SEG # BLD: 3.9 K/UL (ref 1.8–8)
NEUTS SEG NFR BLD: 76 % (ref 32–75)
NITRITE UR QL STRIP.AUTO: NEGATIVE
NRBC # BLD: 0 K/UL (ref 0–0.01)
NRBC BLD-RTO: 0 PER 100 WBC
PH UR STRIP: 7 [PH] (ref 5–8)
PLATELET # BLD AUTO: 276 K/UL (ref 150–400)
PMV BLD AUTO: 11.1 FL (ref 8.9–12.9)
POTASSIUM SERPL-SCNC: 4 MMOL/L (ref 3.5–5.1)
PROT SERPL-MCNC: 7.8 G/DL (ref 6.4–8.2)
PROT UR STRIP-MCNC: NEGATIVE MG/DL
RBC # BLD AUTO: 3.8 M/UL (ref 3.8–5.2)
RBC #/AREA URNS HPF: ABNORMAL /HPF (ref 0–5)
SERVICE CMNT-IMP: NORMAL
SODIUM SERPL-SCNC: 139 MMOL/L (ref 136–145)
SP GR UR REFRACTOMETRY: 1.02 (ref 1–1.03)
T VAGINALIS VAG QL WET PREP: NORMAL
UR CULT HOLD, URHOLD: NORMAL
UROBILINOGEN UR QL STRIP.AUTO: 1 EU/DL (ref 0.2–1)
WBC # BLD AUTO: 5.2 K/UL (ref 3.6–11)
WBC URNS QL MICRO: ABNORMAL /HPF (ref 0–4)

## 2022-04-12 PROCEDURE — 87210 SMEAR WET MOUNT SALINE/INK: CPT

## 2022-04-12 PROCEDURE — 74011250636 HC RX REV CODE- 250/636: Performed by: PHYSICIAN ASSISTANT

## 2022-04-12 PROCEDURE — 81001 URINALYSIS AUTO W/SCOPE: CPT

## 2022-04-12 PROCEDURE — 83690 ASSAY OF LIPASE: CPT

## 2022-04-12 PROCEDURE — 96374 THER/PROPH/DIAG INJ IV PUSH: CPT

## 2022-04-12 PROCEDURE — 99285 EMERGENCY DEPT VISIT HI MDM: CPT

## 2022-04-12 PROCEDURE — 85025 COMPLETE CBC W/AUTO DIFF WBC: CPT

## 2022-04-12 PROCEDURE — 74011000636 HC RX REV CODE- 636: Performed by: EMERGENCY MEDICINE

## 2022-04-12 PROCEDURE — 80053 COMPREHEN METABOLIC PANEL: CPT

## 2022-04-12 PROCEDURE — 74177 CT ABD & PELVIS W/CONTRAST: CPT

## 2022-04-12 PROCEDURE — 81025 URINE PREGNANCY TEST: CPT

## 2022-04-12 PROCEDURE — 36415 COLL VENOUS BLD VENIPUNCTURE: CPT

## 2022-04-12 PROCEDURE — 87491 CHLMYD TRACH DNA AMP PROBE: CPT

## 2022-04-12 PROCEDURE — 76856 US EXAM PELVIC COMPLETE: CPT

## 2022-04-12 PROCEDURE — 76830 TRANSVAGINAL US NON-OB: CPT

## 2022-04-12 PROCEDURE — 74011250637 HC RX REV CODE- 250/637: Performed by: PHYSICIAN ASSISTANT

## 2022-04-12 RX ORDER — HYDROCODONE BITARTRATE AND ACETAMINOPHEN 7.5; 325 MG/1; MG/1
1 TABLET ORAL
Status: COMPLETED | OUTPATIENT
Start: 2022-04-12 | End: 2022-04-12

## 2022-04-12 RX ORDER — ONDANSETRON 2 MG/ML
4 INJECTION INTRAMUSCULAR; INTRAVENOUS
Status: COMPLETED | OUTPATIENT
Start: 2022-04-12 | End: 2022-04-12

## 2022-04-12 RX ADMIN — IOPAMIDOL 100 ML: 755 INJECTION, SOLUTION INTRAVENOUS at 16:06

## 2022-04-12 RX ADMIN — HYDROCODONE BITARTRATE AND ACETAMINOPHEN 1 TABLET: 7.5; 325 TABLET ORAL at 14:58

## 2022-04-12 RX ADMIN — ONDANSETRON 4 MG: 2 INJECTION INTRAMUSCULAR; INTRAVENOUS at 14:58

## 2022-04-12 RX ADMIN — SODIUM CHLORIDE 1000 ML: 9 INJECTION, SOLUTION INTRAVENOUS at 14:59

## 2022-04-12 NOTE — ED PROVIDER NOTES
Kyleigh Kelley is a 51 y.o. female with PMhx of sciatica, DDD, migraine renal stones, tubular adenoma of colon, who presents to ED with cc of 10/10 right-sided abdominal pain X9 30 this morning. Patient endorses nausea and vomiting. States pain comes and goes in waves. Reports taking Pepto at home without much improvement. Pt denies additional symptoms of F/C, CP, SOB, dysuria, urinary frequency, constipation, diarrhea, vaginal bleeding or vaginal discharge. PMHx: Reviewed, as below. PSHx: Reviewed, as below. Pt notes hx of  and tubal ligation. Denies further abd hx. PCP: Kari Winter MD    There are no other complaints verbalized at this time.                Past Medical History:   Diagnosis Date    DDD (degenerative disc disease), lumbosacral     Migraine     Osteoarthritis     Renal stones     Sciatica     Tubular adenoma of colon 2020       Past Surgical History:   Procedure Laterality Date    COLONOSCOPY N/A 2020    COLONOSCOPY performed by Filiberto Haney MD at [facility] HX  SECTION  1996    HX [address]    HX TUBAL LIGATION      HX WISDOM TEETH EXTRACTION      MD BREAST SURGERY PROCEDURE UNLISTED      right breast biopsy - benign         Family History:   Problem Relation Age of Onset    Gout Mother    [facility] Arthritis-rheumatoid Father     No Known Problems Sister     Gout Brother     No Known Problems Other     No Known Problems Son        Social History     Socioeconomic History    Marital status:      Spouse name: Not on file    Number of children: Not on file    Years of education: Not on file    Highest education level: Not on file   Occupational History    Not on file   Tobacco Use    Smoking status: Former Smoker     Packs/day: 0.50     Quit date: 2014     Years since quittin.3    Smokeless tobacco: Never Used   Vaping Use    Vaping Use: Never used   Substance and Sexual Activity    Alcohol use: Not Currently     Alcohol/week: 3.3 standard drinks     Types: 4 Standard drinks or equivalent per week    Drug use: No    Sexual activity: Not Currently     Partners: Male   Other Topics Concern    Not on file   Social History Narrative    Not on file     Social Determinants of Health     Financial Resource Strain:     Difficulty of Paying Living Expenses: Not on file   Food Insecurity:     Worried About Running Out of Food in the Last Year: Not on file    [place] of Food in the Last Year: Not on file   Transportation Needs:     Lack of Transportation (Medical): Not on file    Lack of Transportation (Non-Medical): Not on file   Physical Activity:     Days of Exercise per Week: Not on file    Minutes of Exercise per Session: Not on file   Stress:     Feeling of Stress : Not on file   Social Connections:     Frequency of Communication with Friends and Family: Not on file    Frequency of Social Gatherings with Friends and Family: Not on file    Attends [employer] Services: Not on file    Active Member of [address] [place] [address] [employer] or Organizations: Not on file    Attends Club or Organization Meetings: Not on file    Marital Status: Not on file   Intimate Partner Violence:     Fear of Current or Ex-Partner: Not on file    Emotionally Abused: Not on file    Physically Abused: Not on file    Sexually Abused: Not on file   Housing Stability:     Unable to Pay for Housing in the Last Year: Not on file    Number of [place] in the Last Year: Not on file    Unstable Housing in the Last Year: Not on file         ALLERGIES: Sulfa (sulfonamide antibiotics)    Review of Systems   Constitutional: Negative for chills and fever. Respiratory: Negative for shortness of breath. Cardiovascular: Negative for chest pain. Gastrointestinal: Positive for abdominal pain, nausea and vomiting. Negative for constipation and diarrhea.    Genitourinary: Negative for dysuria, frequency, vaginal bleeding and vaginal discharge. All other systems reviewed and are negative. Vitals:    04/12/22 1327 04/12/22 1543   BP: 120/86 109/69   Pulse: 75 61   Resp: 17 16   Temp: 97.7 °F (36.5 °C) 97.1 °F (36.2 °C)   SpO2: 100% 100%   Weight: 108.9 kg (240 lb)    Height: 5' 4\" (1.626 m)             Physical Exam  Vitals and nursing note reviewed. Constitutional:       Appearance: Normal appearance. She is not diaphoretic. HENT:      Head: Atraumatic. Right Ear: External ear normal.      Left Ear: External ear normal.   Eyes:      Conjunctiva/sclera: Conjunctivae normal.   Cardiovascular:      Rate and Rhythm: Normal rate and regular rhythm. Heart sounds: Normal heart sounds. No murmur heard. No friction rub. No gallop. Pulmonary:      Effort: No respiratory distress. Breath sounds: Normal breath sounds. No stridor. No wheezing, rhonchi or rales. Abdominal:      General: Bowel sounds are normal. There is no distension. Palpations: Abdomen is soft. Tenderness: There is abdominal tenderness in the right upper quadrant, right lower quadrant, periumbilical area and suprapubic area. There is no guarding or rebound. Hernia: No hernia is present. Musculoskeletal:         General: No swelling. Cervical back: Normal range of motion. No rigidity. Skin:     General: Skin is warm and dry. Neurological:      Mental Status: She is alert and oriented to person, place, and time. Mental status is at baseline. MDM  Number of Diagnoses or Management Options     Amount and/or Complexity of Data Reviewed  Clinical lab tests: ordered and reviewed  Tests in the radiology section of CPT®: ordered and reviewed  Discuss the patient with other providers: yes (Dr Justus Tong, ED attending)              4:46 PM  Pt reevaluated. Pt states pain and nausea are much improved. States she just recently had GYN testing with negative for STI and she has not been sexually active since then.  Will plan for US and pelvic exam. I have reviewed with them results as obtained thus far and opportunity for questions presented. Pt verbalizes their understanding. Pelvic Exam    Date/Time: 4/12/2022 5:13 PM  Performed by: PA  Procedure duration:  7 minutes. Documented by:  PA.   Exam assisted by:  Elias STARK.  Type of exam performed: bimanual and speculum. External genitalia appearance: normal.    Vaginal exam:  discharge. The amount of discharge was:  mild. The discharge was white. Cervical exam:  no cervical motion tenderness. Specimens collected: KOH, wet prep. Bimanual exam: no L adenxal tenderness or masses, no R adnexal masses, mild R adenxal tenderness. Patient tolerance: patient tolerated the procedure well with no immediate complications        During pelvic exam, patient reports she has a history of a ruptured ovarian cyst in the past.            5:16 PM  Change of shift. Care of patient signed over to Manuel Pantoja NP. Bedside handoff complete. Awaiting US, KOH and Wet prep results. VITAL SIGNS:  Vitals:    04/12/22 1327 04/12/22 1543   BP: 120/86 109/69   Pulse: 75 61   Resp: 17 16   Temp: 97.7 °F (36.5 °C) 97.1 °F (36.2 °C)   SpO2: 100% 100%   Weight: 108.9 kg (240 lb)    Height: 5' 4\" (1.626 m)          LABS:  Recent Results (from the past 24 hour(s))   CBC WITH AUTOMATED DIFF    Collection Time: 04/12/22  2:57 PM   Result Value Ref Range    WBC 5.2 3.6 - 11.0 K/uL    RBC 3.80 3.80 - 5.20 M/uL    HGB 11.1 (L) 11.5 - 16.0 g/dL    HCT 35.1 35.0 - 47.0 %    MCV 92.4 80.0 - 99.0 FL    MCH 29.2 26.0 - 34.0 PG    MCHC 31.6 30.0 - 36.5 g/dL    RDW 16.8 (H) 11.5 - 14.5 %    PLATELET 441 173 - 432 K/uL    MPV 11.1 8.9 - 12.9 FL    NRBC 0.0 0  WBC    ABSOLUTE NRBC 0.00 0.00 - 0.01 K/uL    NEUTROPHILS 76 (H) 32 - 75 %    LYMPHOCYTES 16 12 - 49 %    MONOCYTES 7 5 - 13 %    EOSINOPHILS 1 0 - 7 %    BASOPHILS 0 0 - 1 %    IMMATURE GRANULOCYTES 0 0.0 - 0.5 %    ABS. NEUTROPHILS 3.9 1.8 - 8.0 K/UL    ABS. LYMPHOCYTES 0.9 0.8 - 3.5 K/UL    ABS. MONOCYTES 0.4 0.0 - 1.0 K/UL    ABS. EOSINOPHILS 0.0 0.0 - 0.4 K/UL    ABS. BASOPHILS 0.0 0.0 - 0.1 K/UL    ABS. IMM. GRANS. 0.0 0.00 - 0.04 K/UL    DF AUTOMATED     METABOLIC PANEL, COMPREHENSIVE    Collection Time: 04/12/22  2:57 PM   Result Value Ref Range    Sodium 139 136 - 145 mmol/L    Potassium 4.0 3.5 - 5.1 mmol/L    Chloride 105 97 - 108 mmol/L    CO2 27 21 - 32 mmol/L    Anion gap 7 5 - 15 mmol/L    Glucose 88 65 - 100 mg/dL    BUN 12 6 - 20 MG/DL    Creatinine 0.96 0.55 - 1.02 MG/DL    BUN/Creatinine ratio 13 12 - 20      GFR est AA >60 >60 ml/min/1.73m2    GFR est non-AA >60 >60 ml/min/1.73m2    Calcium 9.4 8.5 - 10.1 MG/DL    Bilirubin, total 0.3 0.2 - 1.0 MG/DL    ALT (SGPT) 19 12 - 78 U/L    AST (SGOT) 27 15 - 37 U/L    Alk. phosphatase 111 45 - 117 U/L    Protein, total 7.8 6.4 - 8.2 g/dL    Albumin 3.7 3.5 - 5.0 g/dL    Globulin 4.1 (H) 2.0 - 4.0 g/dL    A-G Ratio 0.9 (L) 1.1 - 2.2     LIPASE    Collection Time: 04/12/22  2:57 PM   Result Value Ref Range    Lipase 82 73 - 393 U/L   URINALYSIS W/MICROSCOPIC    Collection Time: 04/12/22  3:02 PM   Result Value Ref Range    Color YELLOW/STRAW      Appearance CLEAR CLEAR      Specific gravity 1.017 1.003 - 1.030      pH (UA) 7.0 5.0 - 8.0      Protein Negative NEG mg/dL    Glucose Negative NEG mg/dL    Ketone Negative NEG mg/dL    Bilirubin Negative NEG      Blood Negative NEG      Urobilinogen 1.0 0.2 - 1.0 EU/dL    Nitrites Negative NEG      Leukocyte Esterase MODERATE (A) NEG      WBC 5-10 0 - 4 /hpf    RBC 0-5 0 - 5 /hpf    Epithelial cells FEW FEW /lpf    Bacteria Negative NEG /hpf   URINE CULTURE HOLD SAMPLE    Collection Time: 04/12/22  3:02 PM    Specimen: Serum; Urine   Result Value Ref Range    Urine culture hold        Urine on hold in Microbiology dept for 2 days. If unpreserved urine is submitted, it cannot be used for addtional testing after 24 hours, recollection will be required.    HCG URINE, QL. - POC    Collection Time: 04/12/22  3:09 PM   Result Value Ref Range    Pregnancy test,urine (POC) Negative NEG           IMAGING:  CT ABD PELV W CONT   Final Result   1. No acute abdominal or pelvic pathology. 2. Punctate bilateral nonobstructing renal stones. US PELV NON OBS    (Results Pending)   US TRANSVAGINAL    (Results Pending)         Medications During Visit:  Medications   sodium chloride 0.9 % bolus infusion 1,000 mL (0 mL IntraVENous IV Completed 4/12/22 3077)   ondansetron (ZOFRAN) injection 4 mg (4 mg IntraVENous Given 4/12/22 6448)   HYDROcodone-acetaminophen (NORCO) 7.5-325 mg per tablet 1 Tablet (1 Tablet Oral Given 4/12/22 7828)   iopamidoL (ISOVUE-370) 76 % injection 100 mL (100 mL IntraVENous Given 4/12/22 1606)         DECISION MAKING:    Bertha Santiago is a 51 y.o. female who comes in as above. Presents afebrile. Had onset of right-sided abdominal pain today and on initial physical exam is tender all along right side. UA demonstrates moderate leukocytes however only 5-10 WBCs and without evidence of nitrates or bacteria and she denies dysuria or urinary frequency. CBC, CMP and lipase without acute etiology. CT demonstrates punctate bilateral nonobstructing renal stones of which patient verbalizes previous knowledge. It demonstrates no further acute abnormality. As such, discussed with patient pelvic etiology. Pelvic exam performed obtaining KOH, and wet prep and GC. She has slight tenderness to right adnexa however no masses and notes only mild discomfort. Ultrasound pending. She verbalizes last gynecology evaluation with negative testing in October and states she has not been sexually active since that time. US pending at time of end of shift. Handoff given to oncoming ACP. I have discussed care with ED attending. Opportunity for questions presented. Pt verbalizes their understanding and agreement with care plan. IMPRESSION:  1. Right sided abdominal pain    2. Right upper quadrant abdominal tenderness without rebound tenderness    3. Right lower quadrant abdominal tenderness without rebound tenderness        DISPOSITION:  Pending. .. Please note that this dictation was completed with [employer]aro, the computer voice recognition software. Quite often unanticipated grammatical, syntax, homophones, and other interpretive errors are inadvertently transcribed by the computer software. Please disregard these errors. Please excuse any errors that have escaped final proofreading.

## 2022-04-12 NOTE — ED TRIAGE NOTES
RLQ pain started today around 0930 this morning  States pains comes and goes- sharp and then dull  Pt took pepto at home - with no relief

## 2022-04-12 NOTE — ED NOTES
4:56 PM  Change of shift. Care of patient taken over from [place], [place]; H&P reviewed, bedside handoff complete. Awaiting US and pelvic swab results. 6:14 PM  US and pelvic swab results unremarkable. Patient stable for discharge with instructions for follow up and conservative care at home.

## 2022-04-13 LAB
C TRACH RRNA SPEC QL NAA+PROBE: NEGATIVE
N GONORRHOEA RRNA SPEC QL NAA+PROBE: NEGATIVE
SPECIMEN SOURCE: NORMAL

## 2022-05-03 LAB
ALBUMIN SERPL-MCNC: 4.3 G/DL (ref 3.8–4.8)
ALBUMIN/GLOB SERPL: 1.4 {RATIO} (ref 1.2–2.2)
ALP SERPL-CCNC: 117 IU/L (ref 44–121)
ALT SERPL-CCNC: 16 IU/L (ref 0–32)
AST SERPL-CCNC: 22 IU/L (ref 0–40)
BASOPHILS # BLD AUTO: 0 X10E3/UL (ref 0–0.2)
BASOPHILS NFR BLD AUTO: 1 %
BILIRUB SERPL-MCNC: 0.3 MG/DL (ref 0–1.2)
BUN SERPL-MCNC: 12 MG/DL (ref 6–24)
BUN/CREAT SERPL: 14 (ref 9–23)
CALCIUM SERPL-MCNC: 9.3 MG/DL (ref 8.7–10.2)
CHLORIDE SERPL-SCNC: 103 MMOL/L (ref 96–106)
CHOLEST SERPL-MCNC: 221 MG/DL (ref 100–199)
CO2 SERPL-SCNC: 25 MMOL/L (ref 20–29)
CREAT SERPL-MCNC: 0.83 MG/DL (ref 0.57–1)
CRP SERPL-MCNC: 12 MG/L (ref 0–10)
EGFR: 87 ML/MIN/1.73
EOSINOPHIL # BLD AUTO: 0 X10E3/UL (ref 0–0.4)
EOSINOPHIL NFR BLD AUTO: 1 %
ERYTHROCYTE [DISTWIDTH] IN BLOOD BY AUTOMATED COUNT: 16.7 % (ref 11.7–15.4)
ERYTHROCYTE [SEDIMENTATION RATE] IN BLOOD BY WESTERGREN METHOD: 10 MM/HR (ref 0–32)
GLOBULIN SER CALC-MCNC: 3 G/DL (ref 1.5–4.5)
GLUCOSE SERPL-MCNC: 85 MG/DL (ref 65–99)
HCT VFR BLD AUTO: 34.2 % (ref 34–46.6)
HDLC SERPL-MCNC: 47 MG/DL
HGB BLD-MCNC: 10.6 G/DL (ref 11.1–15.9)
IMM GRANULOCYTES # BLD AUTO: 0 X10E3/UL (ref 0–0.1)
IMM GRANULOCYTES NFR BLD AUTO: 0 %
LDLC SERPL CALC-MCNC: 159 MG/DL (ref 0–99)
LYMPHOCYTES # BLD AUTO: 0.6 X10E3/UL (ref 0.7–3.1)
LYMPHOCYTES NFR BLD AUTO: 12 %
MCH RBC QN AUTO: 28 PG (ref 26.6–33)
MCHC RBC AUTO-ENTMCNC: 31 G/DL (ref 31.5–35.7)
MCV RBC AUTO: 90 FL (ref 79–97)
MONOCYTES # BLD AUTO: 0.3 X10E3/UL (ref 0.1–0.9)
MONOCYTES NFR BLD AUTO: 5 %
NEUTROPHILS # BLD AUTO: 4.3 X10E3/UL (ref 1.4–7)
NEUTROPHILS NFR BLD AUTO: 81 %
PLATELET # BLD AUTO: 270 X10E3/UL (ref 150–450)
POTASSIUM SERPL-SCNC: 4.8 MMOL/L (ref 3.5–5.2)
PROT SERPL-MCNC: 7.3 G/DL (ref 6–8.5)
RBC # BLD AUTO: 3.79 X10E6/UL (ref 3.77–5.28)
SODIUM SERPL-SCNC: 141 MMOL/L (ref 134–144)
TRIGL SERPL-MCNC: 86 MG/DL (ref 0–149)
VLDLC SERPL CALC-MCNC: 15 MG/DL (ref 5–40)
WBC # BLD AUTO: 5.3 X10E3/UL (ref 3.4–10.8)

## 2022-05-31 DIAGNOSIS — M06.4 UNDIFFERENTIATED INFLAMMATORY ARTHRITIS (HCC): ICD-10-CM

## 2022-05-31 RX ORDER — METHOTREXATE 2.5 MG/1
20 TABLET ORAL
Qty: 96 TABLET | Refills: 0 | Status: SHIPPED | OUTPATIENT
Start: 2022-05-31 | End: 2022-08-11 | Stop reason: SDUPTHER

## 2022-07-20 ENCOUNTER — OFFICE VISIT (OUTPATIENT)
Dept: FAMILY MEDICINE CLINIC | Age: 47
End: 2022-07-20
Payer: COMMERCIAL

## 2022-07-20 VITALS
OXYGEN SATURATION: 98 % | BODY MASS INDEX: 41.32 KG/M2 | HEIGHT: 64 IN | HEART RATE: 60 BPM | SYSTOLIC BLOOD PRESSURE: 117 MMHG | DIASTOLIC BLOOD PRESSURE: 66 MMHG | WEIGHT: 242 LBS | TEMPERATURE: 97.9 F

## 2022-07-20 DIAGNOSIS — G43.109 MIGRAINE WITH AURA AND WITHOUT STATUS MIGRAINOSUS, NOT INTRACTABLE: Primary | ICD-10-CM

## 2022-07-20 PROCEDURE — 99213 OFFICE O/P EST LOW 20 MIN: CPT | Performed by: FAMILY MEDICINE

## 2022-07-20 RX ORDER — TOPIRAMATE 100 MG/1
100 TABLET, FILM COATED ORAL
Qty: 90 TABLET | Refills: 0 | Status: SHIPPED | OUTPATIENT
Start: 2022-07-20 | End: 2022-08-22 | Stop reason: SDUPTHER

## 2022-07-20 RX ORDER — SUMATRIPTAN 100 MG/1
100 TABLET, FILM COATED ORAL
Qty: 9 TABLET | Refills: 1 | Status: SHIPPED | OUTPATIENT
Start: 2022-07-20 | End: 2022-07-20

## 2022-07-20 NOTE — PROGRESS NOTES
Identified pt with two pt identifiers. Reviewed record in preparation for visit and have obtained necessary documentation. All patient medications has been reviewed. Chief Complaint   Patient presents with    Headache    Mass     B/l forearms     Additional information about chief complaint:    Visit Vitals  /66 (BP 1 Location: Left upper arm, BP Patient Position: Sitting, BP Cuff Size: Large adult)   Pulse 60   Temp 97.9 °F (36.6 °C) (Temporal)   Ht 5' 4\" (1.626 m)   Wt 242 lb (109.8 kg)   SpO2 98%   BMI 41.54 kg/m²       Health Maintenance Due   Topic    Cervical cancer screen        1. Have you been to the ER, urgent care clinic since your last visit? Hospitalized since your last visit? No    2. Have you seen or consulted any other health care providers outside of the [address] [place] [address] since your last visit? Include any pap smears or colon screening.  No

## 2022-07-20 NOTE — PROGRESS NOTES
HISTORY OF PRESENT ILLNESS  Angeli Hernandez is a 51 y.o. female. Patient presents with:  Headache  Mass: B/l forearms    Her migraines have been worse for the past 4 months. She is having them weekly. OTC is not working. Review of Systems   Constitutional:  Negative for chills and fever. HENT:          Phonophobia   Eyes:  Positive for blurred vision and photophobia. Occasional dark spots   Gastrointestinal:  Positive for nausea. Negative for vomiting. Neurological:  Positive for headaches. Negative for tingling, sensory change, speech change and focal weakness. Visit Vitals  /66 (BP 1 Location: Left upper arm, BP Patient Position: Sitting, BP Cuff Size: Large adult)   Pulse 60   Temp 97.9 °F (36.6 °C) (Temporal)   Ht 5' 4\" (1.626 m)   Wt 242 lb (109.8 kg)   SpO2 98%   BMI 41.54 kg/m²     Physical Exam  Constitutional:       General: She is not in acute distress. Appearance: Normal appearance. Eyes:      Extraocular Movements: Extraocular movements intact. Pupils: Pupils are equal, round, and reactive to light. Comments: Patient did not tolerate fundoscopic exam   Neurological:      Mental Status: She is alert and oriented to person, place, and time. Gait: Gait normal.      Deep Tendon Reflexes: Reflexes are normal and symmetric. Babinski sign absent on the right side. Babinski sign absent on the left side. Comments: Facies symmetric, uses a cane       ASSESSMENT and PLAN    ICD-10-CM ICD-9-CM    1. Migraine with aura and without status migrainosus, not intractable  G43.109 346.00 topiramate (TOPAMAX) 100 mg tablet      SUMAtriptan (Imitrex) 100 mg tablet          Start Topamax  Imitrex prn    Follow-up and Dispositions    Return in about 6 weeks (around 8/31/2022) for migraines. Reviewed plan of care. Patient has provided input and agrees with goals.

## 2022-07-29 DIAGNOSIS — M06.09 SERONEGATIVE RHEUMATOID ARTHRITIS OF MULTIPLE SITES (HCC): ICD-10-CM

## 2022-08-10 NOTE — PROGRESS NOTES
REASON FOR VISIT    This is an in-office Rheumatology follow-up visit for Ms. Medeiros for     ICD-10-CM   1. Seronegative rheumatoid arthritis of multiple sites ([facility].)  M06.09     Inflammatory arthritis phenotype includes:  Anti-CCP positive: no  Rheumatoid factor positive: no  HLA-B27 positive: no  Erosive disease: N/A  Extra-articular manifestations include: secondary fibromyalgia     Immunosuppression Screening (7/28/2021): Quantiferon TB: negative  PPD:  Not performed  Hepatitis B: negative  Hepatitis C: negative    Therapy History includes:  Current DMARD therapy include: methotrexate 20 mg every Tuesday (10/27/2020 to present), Bartholomew Lu 11 mg XR daily (3/05/2021 to present)  Prior DMARD therapy include: methotrexate 15 mg every Tuesday (8/11/2020 to 10/27/2020)  Discontinued DMARDs because of inefficacy: None  Discontinued DMARDs because of side effects: None    HISTORY OF PRESENT ILLNESS  Ms. Brandon Cook returns for a follow-up. Pt still takes 8 pills of Methotrexate one time per week with daily folic acid. She also takes one pill of Xeljanz daily. Pt reports that her overall joints feel a lot better. Pt reports that her PCP put her on Topamax because her severe headaches came back. She thinks that this is causing numbness and tingling in her hands that comes and goes. She says that this medication makes her feel \"rough\" at night. She tries to drive only when absolutely necessary. Pt reports that she was on Gabapentin in the past. She recalls that it made her feel \"wonky. \" She could not take it when she had to work. Pt denies rashes, infections    Pt says that she has not recieved the Shingles vaccine. Pt notes that she has been trying to clean up her diet and cut out foods that may raise her cholesterol. Pt still takes Vitamin D supplements.      REVIEW OF SYSTEMS    A comprehensive review of systems was performed and pertinent results are documented in the HPI, review of systems is otherwise non-contributory. PAST MEDICAL HISTORY    She has a past medical history of DDD (degenerative disc disease), lumbosacral, Migraine, Osteoarthritis, Renal stones, Sciatica, and Tubular adenoma of colon (6/23/2020). FAMILY HISTORY    Her family history includes Arthritis-rheumatoid in her father; Gout in her brother and mother; No Known Problems in her sister, son, and another family member. SOCIAL HISTORY    She reports that she quit smoking about 7 years ago. Her smoking use included cigarettes. She smoked an average of .5 packs per day. She has never used smokeless tobacco. She reports that she does not currently use alcohol after a past usage of about 3.3 standard drinks per week. She reports that she does not use drugs. MEDICATIONS    Current Outpatient Medications   Medication Sig    tofacitinib 11 mg Tb24 Take 11 mg by mouth daily. Indications: rheumatoid arthritis    topiramate (TOPAMAX) 100 mg tablet Take 1 Tablet by mouth nightly. methotrexate (RHEUMATREX) 2.5 mg tablet Take 8 Tablets by mouth every Tuesday. ergocalciferol (ERGOCALCIFEROL) 1,250 mcg (50,000 unit) capsule Take 1 Capsule by mouth every fourteen (14) days. folic acid (FOLVITE) 1 mg tablet Take 1 Tablet by mouth daily. No current facility-administered medications for this visit. ALLERGIES    Allergies   Allergen Reactions    Sulfa (Sulfonamide Antibiotics) Rash       PHYSICAL EXAMINATION    Visit Vitals  /71   Pulse (!) 57   Temp 98 °F (36.7 °C)   Resp 18   Wt 235 lb (106.6 kg)   BMI 40.34 kg/m²     General:  The patient is well developed, well nourished, alert, and in no apparent distress. Eyes: Sclera are anicteric. No conjunctival injection. HEENT:  Oropharynx is clear. No oral ulcers. Adequate salivary pooling. No cervical or supraclavicular lymphadenopathy. Lungs:  Clear to auscultation bilaterally, without wheeze or stridor. Normal respiratory effort. Cor:  Regular rate and rhythm.   No murmur rub or gallop. Abdomen: Soft, non-tender, without hepatomegaly or masses. Extremities: No calf tenderness. Warm and well perfused. Skin: Post surgical L CMC scar. No petechial, purpuric, or psoriaform rashes. Neuro: Nonfocal  Musculoskeletal:  Crepitus in bilateral shoulders. Post surgical L CMC, interval resolved left hand pain. DATA REVIEW    Laboratory   5/2/22: cholesterol 221, , ESR 10, Cr 0.83, LFT WNL, WBC 5.3, HGB 10.6, Plt 270, CRP 12 mg/L  1/10/22: Cr 0.73, LFT WNL, WBC 6.3, HGB 10.2, Plt 253,   Recent laboratory results were reviewed, summarized, and discussed with the patient. 10/29/21: CRP 2.19, ESR 36    Imaging    Musculoskeletal Ultrasound    None    Radiographs    Bilateral Knee 6/16/2013: overall alignment is normal. The joint spaces are preserved and only minimal arthritic changes are present, with tiny osteophytes present at the margins of the femorotibial compartments bilaterally. There is no evidence of joint effusion, erosions, or other features to suggest inflammatory arthritis. Bilateral hand radiograph showed Alignment and bone mineralization is normal bilaterally. No significant arthritic changes are present. CT Imaging    CT ABD PELV W CONT (4/12/22): 1. No acute abdominal or pelvic pathology. 2. Punctate bilateral nonobstructing renal stones    CT Abdomen and PElvis without contrast 9/28/2020: LOWER THORAX: No significant abnormality in the incidentally imaged lower chest. LIVER: No mass. BILIARY TREE: Gallbladder is within normal limits. CBD is not dilated. SPLEEN: within normal limits. PANCREAS: No focal abnormality. ADRENALS: Unremarkable. KIDNEYS/URETERS: Small nonobstructing bilateral renal stones. STOMACH: Unremarkable. SMALL BOWEL: No dilatation or wall thickening. COLON: No dilatation or wall thickening. APPENDIX: Unremarkable. PERITONEUM: No ascites or pneumoperitoneum.  RETROPERITONEUM: Multiple prominent lymph nodes in the upper abdomen including gastrohepatic ligament, periportal space, and periaortic marylu chains without significant change. REPRODUCTIVE ORGANS: Unremarkable URINARY BLADDER: No mass or calculus. BONES: No destructive bone lesion. ABDOMINAL WALL: No mass or hernia. MR Imaging    MRI Lumbar Spine without contrast 5/12/2014: Alignment: There are 5 nonrib-bearing lumbar type vertebra. There is a normal lumbar lordosis without listhesis. Intervertebral discs: There is L2 and L5 intervertebral disc desiccation with mild height loss at L2 and moderate height loss at the L5 level with mild bulging at L2 and more moderate bulging, anterior extrusion, and dorsal disc protrusion of the L5 disc level. Vertebral bodies/ marrow: There is endplate Modic type II signal demonstrated at the T12, L2 and L5 levels. A small Schmorl's node is seen at the anterior, superior L1 vertebral level. There is no marrow signal to indicate a marrow replacement process or fracture. Conus and cauda equina: The conus terminates at the T12 level and demonstrates normal signal characteristics. The nerve roots of the cauda equina are normal in appearance. Paravertebral soft tissues: There is no paraspinal mass or fluid collection identified. By level: L1-L2: No canal or neural foraminal stenosis. L2-L3: Mild concentric disc bulge canal or neural foraminal stenosis. L3-L4: There is mild facet osteoarthritis without canal or neural foraminal stenosis. L4-L5: There is a left foraminal disc protrusion without canal or neural foraminal stenosis. Mild facet osteoarthritis is also noted. L5-S1: There is a broad-based left paracentral disc protrusion which is not associated with canal or neural foraminal stenosis. MRI Lumbar Spine without contrast 6/01/2011: Lumbar spine alignment is within normal limits. Chronic Schmorl's node formation in the superior endplates of L1 and L3 and in the inferior  endplate of L5. No compression deformity. Mild multilevel disc desiccation.  Conus medullaris terminates at T12-L1. Signal and caliber of the distal cord and conus are within normal limits. Paraspinal soft tissues are within normal limits. T12-L1: facet arthrosis. No herniation or stenosis. L1-L2: Facet arthrosis. No herniation or stenosis. L2-L3: Diffuse disc bulge, facet arthrosis, and thickened ligamentum flavum. Mild central spinal canal stenosis. L3-L4: Diffuse disc bulge, facet arthrosis, and thickened ligamentum flavum. No stenosis. L4-L5: Diffuse disc bulge, facet arthrosis, and thickened ligamentum flavum. No stenosis. L5-S1: Diffuse disc bulge, facet arthrosis, and thickened ligamentum flavum. Mild left foraminal stenosis. DXA     None    PROCEDURE     Left Knee Kenalog 40 mg IA. (10/29/2021)    ASSESSMENT AND PLAN  47yo with seronegative rheumatoid arthritis and secondary osteoarthritis without active inflammatory disease on the regimen of methotrexate and tofacitinib. Pleased with current joint control, no changes to regimen now and if still well-controlled next visit can wean methotrexate. Reviewed ~5% annual risk of shingles on current regimen, for which she is agreeable to pursuing Shingrix series from her pharmacy. 1. Seronegative rheumatoid arthritis of multiple sites ([facility]  - Cont Xeljanz daily  - methotrexate (RHEUMATREX) 2.5 mg tablet; Take 8 Tablets by mouth every Tuesday. Dispense: 96 Tablet; Refill: 0  - C REACTIVE PROTEIN, QT; Future  - CBC WITH AUTOMATED DIFF; Future  - METABOLIC PANEL, COMPREHENSIVE; Future  - SED RATE (ESR); Future  - C REACTIVE PROTEIN, QT; Future  - CBC WITH AUTOMATED DIFF; Future  - METABOLIC PANEL, COMPREHENSIVE; Future  - SED RATE (ESR); Future  - LIPID PANEL; Future    3. Ongoing use of possibly toxic medication  - CBC WITH AUTOMATED DIFF; Future  - METABOLIC PANEL, COMPREHENSIVE; Future  - CBC WITH AUTOMATED DIFF; Future  - METABOLIC PANEL, COMPREHENSIVE; Future    4. Vitamin D deficiency  - VITAMIN D, 25 HYDROXY; Future    5.  Other hyperlipidemia  - LIPID PANEL; Future         Patient Instructions   1) Continue to take 8 pills of Methotrexate one time per week with daily folic acid. 2) Continue to take 1 pill of Xeljanz daily. 3) I recommend you get a COVID booster if your last one was more than 6 months ago. Hold your Methotrexate for one week before and one week after this vaccine for maximal protection. 4) Go to the pharmacy and ask about getting the Shingrix vaccine series. This is 2 shots  by 2 months. Tell them you are on Lise Alonso when you ask. 5) Check labs ASAP and once more before I see you. The second set of blood work will be fasting. 6) Follow up in 4-5 months. Let me know if you have any questions or concerns in the meantime. TODAY'S ORDERS    Orders Placed This Encounter    C REACTIVE PROTEIN, QT    CBC WITH AUTOMATED DIFF    METABOLIC PANEL, COMPREHENSIVE    SED RATE (ESR)    VITAMIN D, 25 HYDROXY    C REACTIVE PROTEIN, QT    CBC WITH AUTOMATED DIFF    METABOLIC PANEL, COMPREHENSIVE    SED RATE (ESR)    LIPID PANEL    methotrexate (RHEUMATREX) 2.5 mg tablet       Future Appointments   Date Time Provider Sonia Amado   8/11/2022  8:40 AM Nelson Sweeney MD [facility] BS AMB   8/22/2022  9:15 AM Kinjal Robison MD [facility] BS AMB   10/14/2022  1:30 PM [facility] PSYCHIATRIC [facility] [address]     Face to face time: 15 minutes  Note preparation and records review day of service: 20 minutes  Total provider time day of service: 35 minutes    This was scribed by Love Addison in the presence of Dr. Qasim Mo MD    Adult Rheumatology   [address] [facility] [address]  [facility] [place], [address] [facility]   Phone [phone]  Fax [phone]

## 2022-08-11 ENCOUNTER — OFFICE VISIT (OUTPATIENT)
Dept: RHEUMATOLOGY | Age: 47
End: 2022-08-11
Payer: COMMERCIAL

## 2022-08-11 VITALS
DIASTOLIC BLOOD PRESSURE: 71 MMHG | BODY MASS INDEX: 40.34 KG/M2 | RESPIRATION RATE: 18 BRPM | WEIGHT: 235 LBS | TEMPERATURE: 98 F | SYSTOLIC BLOOD PRESSURE: 109 MMHG | HEART RATE: 57 BPM

## 2022-08-11 DIAGNOSIS — Z79.899 ONGOING USE OF POSSIBLY TOXIC MEDICATION: ICD-10-CM

## 2022-08-11 DIAGNOSIS — E55.9 VITAMIN D DEFICIENCY: ICD-10-CM

## 2022-08-11 DIAGNOSIS — M06.09 SERONEGATIVE RHEUMATOID ARTHRITIS OF MULTIPLE SITES (HCC): Primary | ICD-10-CM

## 2022-08-11 DIAGNOSIS — M06.4 UNDIFFERENTIATED INFLAMMATORY ARTHRITIS (HCC): ICD-10-CM

## 2022-08-11 DIAGNOSIS — E78.49 OTHER HYPERLIPIDEMIA: ICD-10-CM

## 2022-08-11 PROCEDURE — 99214 OFFICE O/P EST MOD 30 MIN: CPT | Performed by: INTERNAL MEDICINE

## 2022-08-11 RX ORDER — METHOTREXATE 2.5 MG/1
20 TABLET ORAL
Qty: 96 TABLET | Refills: 0 | Status: SHIPPED | OUTPATIENT
Start: 2022-08-16

## 2022-08-11 NOTE — PATIENT INSTRUCTIONS
1) Continue to take 8 pills of Methotrexate one time per week with daily folic acid. 2) Continue to take 1 pill of Xeljanz daily. 3) I recommend you get a COVID booster if your last one was more than 6 months ago. Hold your Methotrexate for one week before and one week after this vaccine for maximal protection. 4) Go to the pharmacy and ask about getting the Shingrix vaccine series. This is 2 shots  by 2 months. Tell them you are on Romeo Sos when you ask. 5) Check labs ASAP and once more before I see you. The second set of blood work will be fasting. 6) Follow up in 4-5 months. Let me know if you have any questions or concerns in the meantime.

## 2022-08-12 LAB
25(OH)D3+25(OH)D2 SERPL-MCNC: 23.7 NG/ML (ref 30–100)
ALBUMIN SERPL-MCNC: 4.3 G/DL (ref 3.8–4.8)
ALBUMIN/GLOB SERPL: 1.7 {RATIO} (ref 1.2–2.2)
ALP SERPL-CCNC: 108 IU/L (ref 44–121)
ALT SERPL-CCNC: 11 IU/L (ref 0–32)
AST SERPL-CCNC: 26 IU/L (ref 0–40)
BASOPHILS # BLD AUTO: 0 X10E3/UL (ref 0–0.2)
BASOPHILS NFR BLD AUTO: 0 %
BILIRUB SERPL-MCNC: 0.3 MG/DL (ref 0–1.2)
BUN SERPL-MCNC: 13 MG/DL (ref 6–24)
BUN/CREAT SERPL: 15 (ref 9–23)
CALCIUM SERPL-MCNC: 9 MG/DL (ref 8.7–10.2)
CHLORIDE SERPL-SCNC: 109 MMOL/L (ref 96–106)
CO2 SERPL-SCNC: 19 MMOL/L (ref 20–29)
CREAT SERPL-MCNC: 0.84 MG/DL (ref 0.57–1)
CRP SERPL-MCNC: 8 MG/L (ref 0–10)
EGFR: 86 ML/MIN/1.73
EOSINOPHIL # BLD AUTO: 0 X10E3/UL (ref 0–0.4)
EOSINOPHIL NFR BLD AUTO: 1 %
ERYTHROCYTE [DISTWIDTH] IN BLOOD BY AUTOMATED COUNT: 17 % (ref 11.7–15.4)
ERYTHROCYTE [SEDIMENTATION RATE] IN BLOOD BY WESTERGREN METHOD: 23 MM/HR (ref 0–32)
GLOBULIN SER CALC-MCNC: 2.6 G/DL (ref 1.5–4.5)
GLUCOSE SERPL-MCNC: 92 MG/DL (ref 65–99)
HCT VFR BLD AUTO: 33.5 % (ref 34–46.6)
HGB BLD-MCNC: 10.7 G/DL (ref 11.1–15.9)
IMM GRANULOCYTES # BLD AUTO: 0 X10E3/UL (ref 0–0.1)
IMM GRANULOCYTES NFR BLD AUTO: 0 %
LYMPHOCYTES # BLD AUTO: 0.9 X10E3/UL (ref 0.7–3.1)
LYMPHOCYTES NFR BLD AUTO: 22 %
MCH RBC QN AUTO: 28.3 PG (ref 26.6–33)
MCHC RBC AUTO-ENTMCNC: 31.9 G/DL (ref 31.5–35.7)
MCV RBC AUTO: 89 FL (ref 79–97)
MONOCYTES # BLD AUTO: 0.3 X10E3/UL (ref 0.1–0.9)
MONOCYTES NFR BLD AUTO: 7 %
NEUTROPHILS # BLD AUTO: 2.9 X10E3/UL (ref 1.4–7)
NEUTROPHILS NFR BLD AUTO: 70 %
PLATELET # BLD AUTO: 220 X10E3/UL (ref 150–450)
POTASSIUM SERPL-SCNC: 4.8 MMOL/L (ref 3.5–5.2)
PROT SERPL-MCNC: 6.9 G/DL (ref 6–8.5)
RBC # BLD AUTO: 3.78 X10E6/UL (ref 3.77–5.28)
SODIUM SERPL-SCNC: 141 MMOL/L (ref 134–144)
WBC # BLD AUTO: 4.1 X10E3/UL (ref 3.4–10.8)

## 2022-08-15 DIAGNOSIS — E55.9 VITAMIN D DEFICIENCY: ICD-10-CM

## 2022-08-15 RX ORDER — ERGOCALCIFEROL 1.25 MG/1
50000 CAPSULE ORAL
Qty: 12 CAPSULE | Refills: 4 | Status: SHIPPED | OUTPATIENT
Start: 2022-08-15

## 2022-08-22 ENCOUNTER — OFFICE VISIT (OUTPATIENT)
Dept: FAMILY MEDICINE CLINIC | Age: 47
End: 2022-08-22
Payer: COMMERCIAL

## 2022-08-22 VITALS
HEIGHT: 64 IN | TEMPERATURE: 97.5 F | OXYGEN SATURATION: 99 % | WEIGHT: 236 LBS | DIASTOLIC BLOOD PRESSURE: 69 MMHG | BODY MASS INDEX: 40.29 KG/M2 | HEART RATE: 69 BPM | SYSTOLIC BLOOD PRESSURE: 114 MMHG

## 2022-08-22 DIAGNOSIS — G43.109 MIGRAINE WITH AURA AND WITHOUT STATUS MIGRAINOSUS, NOT INTRACTABLE: Primary | ICD-10-CM

## 2022-08-22 DIAGNOSIS — G44.229 CHRONIC TENSION-TYPE HEADACHE, NOT INTRACTABLE: ICD-10-CM

## 2022-08-22 PROBLEM — M06.4 UNDIFFERENTIATED INFLAMMATORY ARTHRITIS (HCC): Status: RESOLVED | Noted: 2020-10-27 | Resolved: 2022-08-22

## 2022-08-22 PROBLEM — M19.90 UNDIFFERENTIATED INFLAMMATORY ARTHRITIS: Status: RESOLVED | Noted: 2020-10-27 | Resolved: 2022-08-22

## 2022-08-22 PROCEDURE — 99214 OFFICE O/P EST MOD 30 MIN: CPT | Performed by: FAMILY MEDICINE

## 2022-08-22 RX ORDER — METHOCARBAMOL 500 MG/1
500 TABLET, FILM COATED ORAL
Qty: 30 TABLET | Refills: 1 | Status: SHIPPED | OUTPATIENT
Start: 2022-08-22

## 2022-08-22 RX ORDER — TOPIRAMATE 100 MG/1
100 TABLET, FILM COATED ORAL
Qty: 90 TABLET | Refills: 0 | Status: SHIPPED | OUTPATIENT
Start: 2022-08-22

## 2022-08-22 RX ORDER — SUMATRIPTAN 100 MG/1
TABLET, FILM COATED ORAL
COMMUNITY
Start: 2022-08-20 | End: 2022-10-04

## 2022-08-22 NOTE — PROGRESS NOTES
HISTORY OF PRESENT ILLNESS  Hieu Swift is a 51 y.o. female. Patient presents with: Follow-up  Migraine: Pt is c/o pressure in her head no head pain. At her last visit, she started Topamax. She has 2 headaches in the past 2 weeks. Imitrex work. Now she has a lot of pressure in her posterior neck and temples. Review of Systems   Musculoskeletal:  Positive for neck pain. Neurological:  Positive for headaches. Negative for tingling, sensory change and focal weakness. Visit Vitals  /69 (BP 1 Location: Right upper arm, BP Patient Position: Sitting, BP Cuff Size: Large adult)   Pulse 69   Temp 97.5 °F (36.4 °C) (Temporal)   Ht 5' 4\" (1.626 m)   Wt 236 lb (107 kg)   SpO2 99%   BMI 40.51 kg/m²   Physical Exam  Constitutional:       General: She is not in acute distress. Appearance: Normal appearance. She is well-developed. She is not diaphoretic. Musculoskeletal:      Cervical back: Spasms and tenderness present. No swelling, deformity or bony tenderness. Decreased range of motion. Comments: Posterior neck muscles tight and tender   Skin:     General: Skin is warm and dry. Neurological:      Mental Status: She is alert and oriented to person, place, and time. Sensory: Sensation is intact. Motor: No weakness. Deep Tendon Reflexes:      Reflex Scores:       Tricep reflexes are 2+ on the right side and 2+ on the left side. Bicep reflexes are 2+ on the right side and 2+ on the left side. Brachioradialis reflexes are 2+ on the right side and 2+ on the left side. ASSESSMENT and PLAN    ICD-10-CM ICD-9-CM    1. Migraine with aura and without status migrainosus, not intractable  G43.109 346.00 topiramate (TOPAMAX) 100 mg tablet      2.  Chronic tension-type headache, not intractable  G44.229 339.12 methocarbamoL (ROBAXIN) 500 mg tablet      REFERRAL TO PHYSICAL THERAPY          Mixed headaches  Continue Topamax, refills per orders  Robaxin prn  PT referral  Headaches diary    Follow-up and Dispositions    Return in about 2 months (around 10/22/2022) for headaches. Reviewed plan of care. Patient has provided input and agrees with goals.

## 2022-09-08 ENCOUNTER — HOSPITAL ENCOUNTER (EMERGENCY)
Age: 47
Discharge: HOME | End: 2022-09-08
Attending: EMERGENCY MEDICINE
Payer: COMMERCIAL

## 2022-09-08 ENCOUNTER — APPOINTMENT (OUTPATIENT)
Dept: GENERAL RADIOLOGY | Age: 47
End: 2022-09-08
Attending: PHYSICIAN ASSISTANT
Payer: COMMERCIAL

## 2022-09-08 VITALS
RESPIRATION RATE: 18 BRPM | TEMPERATURE: 97.1 F | HEIGHT: 64 IN | BODY MASS INDEX: 38.58 KG/M2 | DIASTOLIC BLOOD PRESSURE: 85 MMHG | HEART RATE: 66 BPM | OXYGEN SATURATION: 100 % | WEIGHT: 226 LBS | SYSTOLIC BLOOD PRESSURE: 126 MMHG

## 2022-09-08 DIAGNOSIS — Z20.822 EXPOSURE TO COVID-19 VIRUS: ICD-10-CM

## 2022-09-08 DIAGNOSIS — J20.9 ACUTE BRONCHITIS, UNSPECIFIED ORGANISM: ICD-10-CM

## 2022-09-08 DIAGNOSIS — R07.9 CHEST PAIN, UNSPECIFIED TYPE: Primary | ICD-10-CM

## 2022-09-08 LAB
ALBUMIN SERPL-MCNC: 3.7 G/DL (ref 3.5–5)
ALBUMIN/GLOB SERPL: 1 {RATIO} (ref 1.1–2.2)
ALP SERPL-CCNC: 92 U/L (ref 45–117)
ALT SERPL-CCNC: 23 U/L (ref 12–78)
ANION GAP SERPL CALC-SCNC: 6 MMOL/L (ref 5–15)
AST SERPL-CCNC: 33 U/L (ref 15–37)
BASOPHILS # BLD: 0 K/UL (ref 0–0.1)
BASOPHILS NFR BLD: 1 % (ref 0–1)
BILIRUB SERPL-MCNC: 0.3 MG/DL (ref 0.2–1)
BNP SERPL-MCNC: 135 PG/ML
BUN SERPL-MCNC: 9 MG/DL (ref 6–20)
BUN/CREAT SERPL: 11 (ref 12–20)
CALCIUM SERPL-MCNC: 8.9 MG/DL (ref 8.5–10.1)
CHLORIDE SERPL-SCNC: 113 MMOL/L (ref 97–108)
CO2 SERPL-SCNC: 21 MMOL/L (ref 21–32)
COMMENT, HOLDF: NORMAL
COVID-19 RAPID TEST, COVR: NOT DETECTED
CREAT SERPL-MCNC: 0.84 MG/DL (ref 0.55–1.02)
DIFFERENTIAL METHOD BLD: ABNORMAL
EOSINOPHIL # BLD: 0 K/UL (ref 0–0.4)
EOSINOPHIL NFR BLD: 1 % (ref 0–7)
ERYTHROCYTE [DISTWIDTH] IN BLOOD BY AUTOMATED COUNT: 17.1 % (ref 11.5–14.5)
GLOBULIN SER CALC-MCNC: 3.6 G/DL (ref 2–4)
GLUCOSE SERPL-MCNC: 90 MG/DL (ref 65–100)
HCG SERPL QL: NEGATIVE
HCT VFR BLD AUTO: 32.4 % (ref 35–47)
HGB BLD-MCNC: 10.2 G/DL (ref 11.5–16)
IMM GRANULOCYTES # BLD AUTO: 0 K/UL (ref 0–0.04)
IMM GRANULOCYTES NFR BLD AUTO: 0 % (ref 0–0.5)
LYMPHOCYTES # BLD: 0.9 K/UL (ref 0.8–3.5)
LYMPHOCYTES NFR BLD: 21 % (ref 12–49)
MCH RBC QN AUTO: 28.1 PG (ref 26–34)
MCHC RBC AUTO-ENTMCNC: 31.5 G/DL (ref 30–36.5)
MCV RBC AUTO: 89.3 FL (ref 80–99)
MONOCYTES # BLD: 0.4 K/UL (ref 0–1)
MONOCYTES NFR BLD: 8 % (ref 5–13)
NEUTS SEG # BLD: 3 K/UL (ref 1.8–8)
NEUTS SEG NFR BLD: 69 % (ref 32–75)
NRBC # BLD: 0 K/UL (ref 0–0.01)
NRBC BLD-RTO: 0 PER 100 WBC
PLATELET # BLD AUTO: 238 K/UL (ref 150–400)
PMV BLD AUTO: 11.2 FL (ref 8.9–12.9)
POTASSIUM SERPL-SCNC: 4 MMOL/L (ref 3.5–5.1)
PROT SERPL-MCNC: 7.3 G/DL (ref 6.4–8.2)
RBC # BLD AUTO: 3.63 M/UL (ref 3.8–5.2)
SAMPLES BEING HELD,HOLD: NORMAL
SODIUM SERPL-SCNC: 140 MMOL/L (ref 136–145)
SOURCE, COVRS: NORMAL
TROPONIN-HIGH SENSITIVITY: 4 NG/L (ref 0–51)
WBC # BLD AUTO: 4.4 K/UL (ref 3.6–11)

## 2022-09-08 PROCEDURE — 36415 COLL VENOUS BLD VENIPUNCTURE: CPT

## 2022-09-08 PROCEDURE — 96374 THER/PROPH/DIAG INJ IV PUSH: CPT

## 2022-09-08 PROCEDURE — 84484 ASSAY OF TROPONIN QUANT: CPT

## 2022-09-08 PROCEDURE — 99285 EMERGENCY DEPT VISIT HI MDM: CPT

## 2022-09-08 PROCEDURE — 74011250636 HC RX REV CODE- 250/636: Performed by: PHYSICIAN ASSISTANT

## 2022-09-08 PROCEDURE — U0005 INFEC AGEN DETEC AMPLI PROBE: HCPCS

## 2022-09-08 PROCEDURE — 71046 X-RAY EXAM CHEST 2 VIEWS: CPT

## 2022-09-08 PROCEDURE — 83880 ASSAY OF NATRIURETIC PEPTIDE: CPT

## 2022-09-08 PROCEDURE — 87635 SARS-COV-2 COVID-19 AMP PRB: CPT

## 2022-09-08 PROCEDURE — 80053 COMPREHEN METABOLIC PANEL: CPT

## 2022-09-08 PROCEDURE — 93005 ELECTROCARDIOGRAM TRACING: CPT

## 2022-09-08 PROCEDURE — 84703 CHORIONIC GONADOTROPIN ASSAY: CPT

## 2022-09-08 PROCEDURE — 85025 COMPLETE CBC W/AUTO DIFF WBC: CPT

## 2022-09-08 RX ORDER — KETOROLAC TROMETHAMINE 30 MG/ML
30 INJECTION, SOLUTION INTRAMUSCULAR; INTRAVENOUS
Status: COMPLETED | OUTPATIENT
Start: 2022-09-08 | End: 2022-09-08

## 2022-09-08 RX ADMIN — KETOROLAC TROMETHAMINE 30 MG: 30 INJECTION, SOLUTION INTRAMUSCULAR at 11:16

## 2022-09-08 NOTE — ED PROVIDER NOTES
26-year-old female past medical history significant for migraine, renal stones, DDD, sciatica who presents amatory for evaluation of cough, chest tightness for the past 2 days with known COVID exposure. She states her 2-month grandson and his father both tested positive for COVID within the past few days. Patient's that she began to feel tightness in her chest 2-3 days ago and developed a dry cough. She has a scratchy throat and rhinorrhea. She denies fever, chills, vomiting, diarrhea, pleuritic chest pain, estrogen use, recent travel or surgery, leg swelling, calf pain, hemoptysis, dyspnea with exertion. Has had covid in the past and states this feels similar to when she had COVID before, was not complicated, did not require admission.        Past Medical History:   Diagnosis Date    Chronic tension-type headache, not intractable 2022    DDD (degenerative disc disease), lumbosacral     Migraine     Osteoarthritis     Renal stones     Sciatica     Tubular adenoma of colon 2020       Past Surgical History:   Procedure Laterality Date    COLONOSCOPY N/A 2020    COLONOSCOPY performed by Wale Colón MD at [facility] ENDOSCOPY    HX  SECTION  , [address]    HX TUBAL LIGATION      HX WISDOM TEETH EXTRACTION      OH BREAST SURGERY PROCEDURE UNLISTED      right breast biopsy - benign         Family History:   Problem Relation Age of Onset    Gout Mother     Arthritis-rheumatoid Father     No Known Problems Sister     Gout Brother     No Known Problems Other     No Known Problems Son        Social History     Socioeconomic History    Marital status:      Spouse name: Not on file    Number of children: Not on file    Years of education: Not on file    Highest education level: Not on file   Occupational History    Not on file   Tobacco Use    Smoking status: Former     Packs/day: 0.50     Types: Cigarettes     Quit date: 2014     Years since quitting: 7.7    Smokeless tobacco: Never   Vaping Use    Vaping Use: Never used   Substance and Sexual Activity    Alcohol use: Not Currently     Alcohol/week: 3.3 standard drinks     Types: 4 Standard drinks or equivalent per week    Drug use: No    Sexual activity: Not Currently     Partners: Male   Other Topics Concern    Not on file   Social History Narrative    Not on file     Social Determinants of Health     Financial Resource Strain: Not on file   Food Insecurity: Not on file   Transportation Needs: Not on file   Physical Activity: Not on file   Stress: Not on file   Social Connections: Not on file   Intimate Partner Violence: Not on file   Housing Stability: Not on file         ALLERGIES: Sulfa (sulfonamide antibiotics)    Review of Systems   Constitutional: Negative. Negative for activity change, chills, fatigue and unexpected weight change. HENT:  Negative for trouble swallowing. Respiratory:  Positive for cough and chest tightness. Negative for shortness of breath and wheezing. Cardiovascular: Negative. Negative for chest pain and palpitations. Gastrointestinal: Negative. Negative for abdominal pain, diarrhea, nausea and vomiting. Genitourinary: Negative. Negative for dysuria, flank pain, frequency and hematuria. Musculoskeletal: Negative. Negative for arthralgias, back pain, neck pain and neck stiffness. Skin: Negative. Negative for color change and rash. Neurological: Negative. Negative for dizziness, numbness and headaches. All other systems reviewed and are negative. Vitals:    09/08/22 0937   BP: 126/85   Pulse: 66   Resp: 18   Temp: 97.1 °F (36.2 °C)   SpO2: 100%   Weight: 102.5 kg (226 lb)   Height: 5' 4\" (1.626 m)            Physical Exam  Vitals and nursing note reviewed. Constitutional:       General: She is not in acute distress. Appearance: Normal appearance. She is well-developed. She is not toxic-appearing or diaphoretic.    HENT:      Head: Normocephalic and atraumatic. Eyes:      General:         Right eye: No discharge. Left eye: No discharge. Conjunctiva/sclera: Conjunctivae normal.   Neck:      Trachea: No tracheal tenderness. Cardiovascular:      Rate and Rhythm: Normal rate and regular rhythm. Pulses: Normal pulses. Heart sounds: Normal heart sounds. No murmur heard. No friction rub. No gallop. Pulmonary:      Effort: Pulmonary effort is normal. No respiratory distress. Breath sounds: Normal breath sounds. No wheezing or rales. Comments: TTP of anterior chest wall; no signs of trauma. Chest:      Chest wall: Tenderness present. Abdominal:      General: Bowel sounds are normal. There is no distension. Palpations: Abdomen is soft. Tenderness: There is no abdominal tenderness. There is no guarding or rebound. Musculoskeletal:         General: No tenderness. Normal range of motion. Cervical back: Full passive range of motion without pain and normal range of motion. Skin:     General: Skin is warm and dry. Capillary Refill: Capillary refill takes less than 2 seconds. Findings: No abrasion, erythema or rash. Neurological:      General: No focal deficit present. Mental Status: She is alert and oriented to person, place, and time. Cranial Nerves: No cranial nerve deficit. Sensory: No sensory deficit.       Coordination: Coordination normal.   Psychiatric:         Speech: Speech normal.         Behavior: Behavior normal.        MDM  Number of Diagnoses or Management Options  Acute bronchitis, unspecified organism  Chest pain, unspecified type  Exposure to COVID-19 virus  Diagnosis management comments:   DDx: Bronchitis, pneumonia, ACS, COVID       Amount and/or Complexity of Data Reviewed  Clinical lab tests: ordered and reviewed  Tests in the radiology section of CPT®: ordered and reviewed  Review and summarize past medical records: yes  Discuss the patient with other providers: yes  Independent visualization of images, tracings, or specimens: yes    Patient Progress  Patient progress: stable    ED Course as of 09/08/22 1426   Thu Sep 08, 2022   0954 EKG obtained at 926 showing sinus bradycardia, rate 55, normal intervals, normal axis, no acute appearing findings, no prior EKG available for comparison. [JE]      ED Course User Index  [JE] Hakeem Negron MD       Procedures    [facility] score 0. Has muscle relaxer, cannot take continuous NSAIDs due to MTX. Recommend Tylenol, warm compresses, has muscle relaxer. COVID PCR pending., + COVID exposure. Plan for discharge with PCP follow-up and return precautions discussed. Gertrudis Cade PA-C      DISCHARGE NOTE:  1:38 PM  The patient has been re-evaluated and feeling much better and are stable for discharge. All available radiology and laboratory results have been reviewed with patient and/or available family. Patient and/or family verbally conveyed their understanding and agreement of the patient's signs, symptoms, diagnosis, treatment and prognosis and additionally agree to follow-up as recommended in the discharge instructions or to return to the Emergency Department should their condition change or worsen prior to their follow-up appointment. All questions have been answered and patient and/or available family express understanding. LABORATORY RESULTS:  Recent Results (from the past 24 hour(s))   SAMPLES BEING HELD    Collection Time: 09/08/22  9:40 AM   Result Value Ref Range    SAMPLES BEING HELD 1LAV,1DRK GRN,1PST,1BL,1SST,1RED     COMMENT        Add-on orders for these samples will be processed based on acceptable specimen integrity and analyte stability, which may vary by analyte.    CBC WITH AUTOMATED DIFF    Collection Time: 09/08/22  9:40 AM   Result Value Ref Range    WBC 4.4 3.6 - 11.0 K/uL    RBC 3.63 (L) 3.80 - 5.20 M/uL    HGB 10.2 (L) 11.5 - 16.0 g/dL    HCT 32.4 (L) 35.0 - 47.0 %    MCV 89.3 80.0 - 99.0 FL MCH 28.1 26.0 - 34.0 PG    MCHC 31.5 30.0 - 36.5 g/dL    RDW 17.1 (H) 11.5 - 14.5 %    PLATELET 804 048 - 448 K/uL    MPV 11.2 8.9 - 12.9 FL    NRBC 0.0 0  WBC    ABSOLUTE NRBC 0.00 0.00 - 0.01 K/uL    NEUTROPHILS 69 32 - 75 %    LYMPHOCYTES 21 12 - 49 %    MONOCYTES 8 5 - 13 %    EOSINOPHILS 1 0 - 7 %    BASOPHILS 1 0 - 1 %    IMMATURE GRANULOCYTES 0 0.0 - 0.5 %    ABS. NEUTROPHILS 3.0 1.8 - 8.0 K/UL    ABS. LYMPHOCYTES 0.9 0.8 - 3.5 K/UL    ABS. MONOCYTES 0.4 0.0 - 1.0 K/UL    ABS. EOSINOPHILS 0.0 0.0 - 0.4 K/UL    ABS. BASOPHILS 0.0 0.0 - 0.1 K/UL    ABS. IMM. GRANS. 0.0 0.00 - 0.04 K/UL    DF AUTOMATED     METABOLIC PANEL, COMPREHENSIVE    Collection Time: 09/08/22  9:40 AM   Result Value Ref Range    Sodium 140 136 - 145 mmol/L    Potassium 4.0 3.5 - 5.1 mmol/L    Chloride 113 (H) 97 - 108 mmol/L    CO2 21 21 - 32 mmol/L    Anion gap 6 5 - 15 mmol/L    Glucose 90 65 - 100 mg/dL    BUN 9 6 - 20 MG/DL    Creatinine 0.84 0.55 - 1.02 MG/DL    BUN/Creatinine ratio 11 (L) 12 - 20      GFR est AA >60 >60 ml/min/1.73m2    GFR est non-AA >60 >60 ml/min/1.73m2    Calcium 8.9 8.5 - 10.1 MG/DL    Bilirubin, total 0.3 0.2 - 1.0 MG/DL    ALT (SGPT) 23 12 - 78 U/L    AST (SGOT) 33 15 - 37 U/L    Alk.  phosphatase 92 45 - 117 U/L    Protein, total 7.3 6.4 - 8.2 g/dL    Albumin 3.7 3.5 - 5.0 g/dL    Globulin 3.6 2.0 - 4.0 g/dL    A-G Ratio 1.0 (L) 1.1 - 2.2     TROPONIN-HIGH SENSITIVITY    Collection Time: 09/08/22  9:40 AM   Result Value Ref Range    Troponin-High Sensitivity 4 0 - 51 ng/L   NT-PRO BNP    Collection Time: 09/08/22  9:40 AM   Result Value Ref Range    NT pro- (H) <125 PG/ML   HCG QL SERUM    Collection Time: 09/08/22  9:40 AM   Result Value Ref Range    HCG, Ql. Negative NEG     COVID-19 RAPID TEST    Collection Time: 09/08/22  9:43 AM   Result Value Ref Range    Specimen source Nasopharyngeal      COVID-19 rapid test Not detected NOTD         IMAGING RESULTS:  XR CHEST PA LAT    Result Date: 9/8/2022  No acute cardiopulmonary process. MEDICATIONS GIVEN:  Medications   ketorolac (TORADOL) injection 30 mg (30 mg IntraVENous Given 9/8/22 1116)       IMPRESSION:  1. Chest pain, unspecified type    2. Acute bronchitis, unspecified organism    3.  Exposure to COVID-19 virus        PLAN:  Follow-up Information       Follow up With Specialties Details Why Contact Info    Madeline Dominguez MD Family Medicine Schedule an appointment as soon as possible for a visit   [address]  [phone]            Current Discharge Medication List

## 2022-09-08 NOTE — DISCHARGE INSTRUCTIONS
Continue to quarantine until your COVID test results and if positive continue to quarantine for 5 days from when your symptoms began. Follow-up with your primary care provider in the next few days for evaluation.

## 2022-09-08 NOTE — ED TRIAGE NOTES
Pt arrives co cough that started last night.  Pt states SOB with exertion  Pt with known covid exposure  Pt is vaccinated for covid

## 2022-09-09 LAB
ATRIAL RATE: 55 BPM
CALCULATED P AXIS, ECG09: 57 DEGREES
CALCULATED R AXIS, ECG10: 36 DEGREES
CALCULATED T AXIS, ECG11: 21 DEGREES
DIAGNOSIS, 93000: NORMAL
P-R INTERVAL, ECG05: 144 MS
Q-T INTERVAL, ECG07: 410 MS
QRS DURATION, ECG06: 72 MS
QTC CALCULATION (BEZET), ECG08: 392 MS
SARS-COV-2, XPLCVT: NOT DETECTED
SOURCE, COVRS: NORMAL
VENTRICULAR RATE, ECG03: 55 BPM

## 2022-09-19 ENCOUNTER — HOSPITAL ENCOUNTER (OUTPATIENT)
Dept: PHYSICAL THERAPY | Age: 47
Discharge: HOME | End: 2022-09-19
Payer: COMMERCIAL

## 2022-09-19 PROCEDURE — 97162 PT EVAL MOD COMPLEX 30 MIN: CPT

## 2022-09-19 NOTE — PROGRESS NOTES
PT INITIAL EVALUATION NOTE 2-15    Patient Name: Liza Puga  Date:2022  : 1975  [x]  Patient  Verified  Payor: [address] [place] [address] Road / Plan: [address] / Product Type: Managed Care Medicaid /    In time:2:30 p  Out time:3:20 p  Total Treatment Time (min): 50  Visit #: 1     Treatment Area: Headache [R51.9]  Neck pain [M54.2]    SUBJECTIVE  Pain Level (0-10 scale): Current- 8, Best- 3, Worst- 10    Any medication changes, allergies to medications, adverse drug reactions, diagnosis change, or new procedure performed?: [] No    [x] Yes (see summary sheet for update)  Subjective:     Chief complaint: Migraines and HA, she has always had HA's but in March or April they came back worse and OTC med weren't helping enough. The muscles in her neck got very tight, causing tension HA and migraines together. She reports polypharmacy and is unsure if this is impacting her HA's. She now takes prescription medications which are helpful but she is limited in how many she can take when she needs to be alert. She has never had Botox injections but she has had acupuncture on her lower back. She doesn't think she could try dry needling due to financial requirements. Aggravating factors: any head movements   Easing factors: medications, heat   Imaging/tests: denies   Numbness/tingling: \"not in my neck but everywhere else\"  PLOF: Able to cook and bake in the kitchen but is now some days confined to her bed   Mechanism of Injury: Insidious onset   Previous Treatment/Compliance: Previous PT for knees and back   PMHx/Surgical Hx: sciatica, DDD, OA, migraines, kidney stones, tubular adenoma of colon, s/p  (, ), tonsillectomy, tubal ligation (), wisdom teeth extraction ()  Work Hx: Not currently working due to complex medical situations   Living Situation: Lives with her    Pt Goals:  \"Be able to get back into cooking\"  She struggles with looking down in the kitchen   Barriers: Chronicity, complex PMH  Motivation: Motivated   Substance use: None noted    Cognition: A & O x 4        OBJECTIVE/EXAMINATION  Posture: Mod-max forward head/rounded shoulders   Other Observations:  slow to move head and frequently expressing pain is moving too quickly, ambulates with a cane, requires BUE assist sit to stand    Palpation: Hypertonicity noted grossly through cervical and scapular musculature, TTP L cervical paraspinals and suboccipitals     Cervical AROM: rests at 5 degrees of flexion           R  L    Flexion    11         Extension   9         Side Bending   5  10        Rotation   9  3    Upper Extremity AROM:       R  L         Flexion    149  105, p!  In L side of neck     Sensation: Decreased sensation L hand s/p [facility] surgery February 7th        Modality rationale: decrease pain and increase tissue extensibility to improve the patients ability to drive    Min Type Additional Details    [] Estim: []Att   []Unatt        []TENS instruct                  []IFC  []Premod   []NMES                     []Other:  []w/US   []w/ice   []w/heat  Position:  Location:    []  Traction: [] Cervical       []Lumbar                       [] Prone          []Supine                       []Intermittent   []Continuous Lbs:  [] before manual  [] after manual  []w/heat    []  Ultrasound: []Continuous   [] Pulsed at:                            []1MHz   []3MHz Location:  W/cm2:    []  Paraffin         Location:  []w/heat   10 []  Ice     [x]  Heat  []  Ice massage Position: seated  Location: cervical     []  Laser  []  Other: Position:  Location:    []  Vasopneumatic Device Pressure:       [] lo [] med [] hi   Temperature:    [x] Skin assessment post-treatment:  [x]intact []redness- no adverse reaction    []redness - adverse reaction:     7 min Therapeutic Exercise:  [x] See flow sheet :   Rationale: increase ROM to improve the patients ability to drive            With   [] TE   [] TA   [] Neuro   [] SC   [] other: Patient Education: [x] Review HEP    [] Progressed/Changed HEP based on:   [] positioning   [] body mechanics   [] transfers   [] heat/ice application    [] other:        Other Objective/Functional Measures: FOTO Functional Measure: 34/100                  Pain Level (0-10 scale) post treatment: 7      ASSESSMENT:      [x]  See Plan of Karina Nelson 26, DPT 9/19/2022

## 2022-09-19 NOTE — THERAPY EVALUATION
Physical Therapy at [facility],   a part of  [facility]  [address] [facility],  [address]  Phone: [phone]  Fax: [phone]    Plan of Care/ Statement of Necessity for Physical Therapy Services 2-15    Patient name: Carlos Delgado  : 1975  Provider#: [id number]  Referral source: Enoch Savage MD      Medical/Treatment Diagnosis: Headache [R51.9]  Neck pain [M54.2]     Prior Hospitalization: see medical history     Comorbidities: fibromyalgia, sciatica, DDD, OA, migraines, kidney stones, tubular adenoma of colon, s/p  (, ), tonsillectomy, tubal ligation (), wisdom teeth extraction ()  Prior Level of Function: Able to cook and bake in the kitchen but is now some days confined to her bed   Medications: Verified on Patient Summary List    Start of Care: 21      Onset Date: Exacerbation of chronic HA pain, new onset of cervical pain in 2022       The Plan of Care and following information is based on the information from the initial evaluation. Assessment/ key information: Patient is a pleasant 51year old female presenting with chronic HA and new onset of cervical pain and tension. Current symptoms limit functional ability to drive, cook, or care for her grandson. Marked deficits include severe cervical AROM restriction, gross cervical and thoracic musculature hypertonicity, and fear avoidance behaviors. Patient would potentially benefit from dry needling intervention to decrease hypertension but reports financial restriction in pursuing this. Patient will benefit from skilled PT to address all previously listed deficits.         Evaluation Complexity History HIGH Complexity :3+ comorbidities / personal factors will impact the outcome/ POC ; Examination MEDIUM Complexity : 3 Standardized tests and measures addressing body structure, function, activity limitation and / or participation in recreation ;Presentation MEDIUM Complexity : Evolving with changing characteristics  ; Clinical Decision Making MEDIUM Complexity : FOTO score of 26-74  Overall Complexity Rating: MEDIUM    Problem List: pain affecting function, decrease ROM, decrease ADL/ functional abilitiies, decrease activity tolerance, and decrease flexibility/ joint mobility   Treatment Plan may include any combination of the following: Therapeutic exercise, Therapeutic activities, Neuromuscular re-education, Physical agent/modality, Gait/balance training, Manual therapy, Patient education, Self Care training, Functional mobility training, Home safety training, and Stair training  Patient / Family readiness to learn indicated by: asking questions, trying to perform skills, and interest  Persons(s) to be included in education: patient (P)  Barriers to Learning/Limitations: None  Patient Goal (s): Be able to get back into cooking  Patient Self Reported Health Status: fair  Rehabilitation Potential: good    Short Term Goals: To be accomplished in 4 weeks:  Patient will be independent with initial HEP in order to transition to general wellness program.  Patient will demonstrate cervical extension to 15 degrees or better to ease looking into high cabinets. Patient will report worst cervical pain of 7/10 or better to increase QOL. Long Term Goals: To be accomplished in 12 weeks:  Patient will report worst cervical pain no greater than 2/10 to increase QOL. Patient will report 50% reduction in HA occurrence and frequency to ease caring for her grandson. Patient will demonstrate full cervical AROM WNL to ease driving. Patient will demonstrate B shoulder flexion WNL to ease reaching for cooking equipment overhead. Frequency / Duration: Patient to be seen 2 times per week for up to 12 weeks.     Patient/ Caregiver education and instruction: self care, activity modification, and exercises    [x]  Plan of care has been reviewed with MAURA Shi Dena Walker, DPT 9/19/2022     ________________________________________________________________________    I certify that the above Therapy Services are being furnished while the patient is under my care. I agree with the treatment plan and certify that this therapy is necessary.     Physician's Signature:____________________  Date:____________Time: _________      Adrian Orellana MD

## 2022-09-21 ENCOUNTER — HOSPITAL ENCOUNTER (OUTPATIENT)
Dept: PHYSICAL THERAPY | Age: 47
Discharge: HOME | End: 2022-09-21
Payer: COMMERCIAL

## 2022-09-21 PROCEDURE — 97110 THERAPEUTIC EXERCISES: CPT

## 2022-09-21 PROCEDURE — 97016 VASOPNEUMATIC DEVICE THERAPY: CPT

## 2022-09-21 PROCEDURE — 97140 MANUAL THERAPY 1/> REGIONS: CPT

## 2022-09-21 NOTE — PROGRESS NOTES
PT DAILY TREATMENT NOTE 2-15    Patient Name: Avery Peñaloza  Date:2022  : 1975  [x]  Patient  Verified  Payor: [address] / Plan: [address] / Product Type: Managed Care Medicaid /    In time: 10:12  Out time: 11:05  Total Treatment Time (min): 53  Visit #:  2    Treatment Area: Headache [R51.9]  Neck pain [M54.2]    SUBJECTIVE  Pain Level (0-10 scale): 3  Any medication changes, allergies to medications, adverse drug reactions, diagnosis change, or new procedure performed?: [x] No    [] Yes (see summary sheet for update)  Subjective functional status/changes:   [] No changes reported  Patient reports that her pain was so intense yesterday that she was questioning if she could come today. So far today the pain is better but her headache is \"twinging. \"    OBJECTIVE    Modality rationale: decrease pain and increase tissue extensibility to improve the patients ability to drive and perform ADL's    Min Type Additional Details       [] Estim: []Att   []Unatt    []TENS instruct                  []IFC  []Premod   []NMES                     []Other:  []w/US   []w/ice   []w/heat  Position:  Location:       []  Traction: [] Cervical       []Lumbar                       [] Prone          []Supine                       []Intermittent   []Continuous Lbs:  [] before manual  [] after manual  []w/heat    []  Ultrasound: []Continuous   [] Pulsed                       at: []1MHz   []3MHz Location:  W/cm2:    [] Paraffin         Location:   []w/heat   15 []  Ice     [x]  Heat  []  Ice massage Position: supine  Location: cervical     []  Laser  []  Other: Position:  Location:   15   [x]  Vasopneumatic Device Pressure:       [x] lo [] med [] hi   Temperature: 34     [x] Skin assessment post-treatment:  [x]intact []redness- no adverse reaction    []redness - adverse reaction:         28 min Therapeutic Exercise:  [x] See flow sheet :   Rationale: increase ROM and increase strength to improve the patients ability to drive and perform ADL's     10 min Manual Therapy:  SOR, manual distraction, STM B UT/LS, cervical paraspinals, PROM UT stretching    Rationale: decrease pain, increase ROM, and increase tissue extensibility  to improve the patients ability to drive and perform ADL's             With   [] TE   [] TA   [] Neuro   [] SC   [] other: Patient Education: [x] Review HEP    [] Progressed/Changed HEP based on:   [] positioning   [] body mechanics   [] transfers   [] heat/ice application    [] other:      Other Objective/Functional Measures: Patient demonstrating improved cervical range compared to IE  Less TTP to L cervical paraspinals compared to last session      Pain Level (0-10 scale) post treatment: 1    ASSESSMENT/Changes in Function:   Excellent progress along POC demonstrated at first follow up visit  Patient will continue to benefit from skilled PT services to modify and progress therapeutic interventions, address functional mobility deficits, address ROM deficits, address strength deficits, analyze and address soft tissue restrictions, analyze and cue movement patterns, analyze and modify body mechanics/ergonomics, assess and modify postural abnormalities, and instruct in home and community integration to attain remaining goals. []  See Plan of Care  []  See progress note/recertification  []  See Discharge Summary         Progress towards goals / Updated goals:   Independent with introductory HEP, able to progress to tolerance today    PLAN  [x]  Upgrade activities as tolerated     [x]  Continue plan of care  [x]  Update interventions per flow sheet       []  Discharge due to:_  []  Other:_      Yohana Nicky, DPT 9/21/2022

## 2022-09-26 ENCOUNTER — HOSPITAL ENCOUNTER (OUTPATIENT)
Dept: PHYSICAL THERAPY | Age: 47
Discharge: HOME | End: 2022-09-26
Payer: COMMERCIAL

## 2022-09-26 PROCEDURE — 97014 ELECTRIC STIMULATION THERAPY: CPT

## 2022-09-26 PROCEDURE — 97110 THERAPEUTIC EXERCISES: CPT

## 2022-09-28 ENCOUNTER — HOSPITAL ENCOUNTER (OUTPATIENT)
Dept: PHYSICAL THERAPY | Age: 47
Discharge: HOME | End: 2022-09-28
Payer: COMMERCIAL

## 2022-09-28 PROCEDURE — 97110 THERAPEUTIC EXERCISES: CPT

## 2022-09-28 PROCEDURE — 97014 ELECTRIC STIMULATION THERAPY: CPT

## 2022-09-28 NOTE — PROGRESS NOTES
PT DAILY TREATMENT NOTE 2-15    Patient Name: Christiano Smith  Date:2022  : 1975  [x]  Patient  Verified  Payor: [address] / Plan: [address] / Product Type: Managed Care Medicaid /    In time: 10:00  Out time: 11:00  Total Treatment Time (min): 60  Visit #:  4    Treatment Area: Headache [R51.9]  Neck pain [M54.2]    SUBJECTIVE  Pain Level (0-10 scale): 0- sinus pressure   Any medication changes, allergies to medications, adverse drug reactions, diagnosis change, or new procedure performed?: [x] No    [] Yes (see summary sheet for update)  Subjective functional status/changes:   [] No changes reported  Patient reports that she has only had 1 or 2 day where her neck has given her trouble. She has only had a few headaches recently and did not feel like she needed to take medication.      OBJECTIVE    Modality rationale: decrease pain and increase tissue extensibility to improve the patients ability to drive and perform ADL's    Min Type Additional Details      15 [x] Estim: []Att   [x]Unatt    []TENS instruct                  [x]IFC  []Premod   []NMES                     []Other:  []w/US   []w/ice   [x]w/heat  Position: seated  Location: cervical        []  Traction: [] Cervical       []Lumbar                       [] Prone          []Supine                       []Intermittent   []Continuous Lbs:  [] before manual  [] after manual  []w/heat    []  Ultrasound: []Continuous   [] Pulsed                       at: []1MHz   []3MHz Location:  W/cm2:    [] Paraffin         Location:   []w/heat    []  Ice     [x]  Heat  []  Ice massage Position: supine  Location: cervical     []  Laser  []  Other: Position:  Location:      [x]  Vasopneumatic Device Pressure:       [x] lo [] med [] hi   Temperature: 34     [x] Skin assessment post-treatment:  [x]intact []redness- no adverse reaction    []redness - adverse reaction:         45 min Therapeutic Exercise:  [x] See flow sheet : Rationale: increase ROM and increase strength to improve the patients ability to drive and perform ADL's               With   [] TE   [] TA   [] Neuro   [] SC   [] other: Patient Education: [x] Review HEP    [] Progressed/Changed HEP based on:   [] positioning   [] body mechanics   [] transfers   [] heat/ice application    [] other:      Other Objective/Functional Measures: Excellent tolerance for advanced exercises     Pain Level (0-10 scale) post treatment: 0    ASSESSMENT/Changes in Function:     Patient will continue to benefit from skilled PT services to modify and progress therapeutic interventions, address functional mobility deficits, address ROM deficits, address strength deficits, analyze and address soft tissue restrictions, analyze and cue movement patterns, analyze and modify body mechanics/ergonomics, assess and modify postural abnormalities, and instruct in home and community integration to attain remaining goals.      []  See Plan of Care  []  See progress note/recertification  []  See Discharge Summary         Progress towards goals / Updated goals:  Patient was able to get back into the kitchen and bake a loaf of honey oat bread, achieving her goal to return to baking    PLAN  [x]  Upgrade activities as tolerated     [x]  Continue plan of care  [x]  Update interventions per flow sheet       []  Discharge due to:_  []  Other:_      Wendy Escalante DPT 9/28/2022

## 2022-10-04 RX ORDER — SUMATRIPTAN 100 MG/1
TABLET, FILM COATED ORAL
Qty: 9 TABLET | Refills: 3 | Status: SHIPPED | OUTPATIENT
Start: 2022-10-04 | End: 2022-10-17 | Stop reason: SDUPTHER

## 2022-10-05 ENCOUNTER — HOSPITAL ENCOUNTER (OUTPATIENT)
Dept: PHYSICAL THERAPY | Age: 47
Discharge: HOME | End: 2022-10-05
Payer: COMMERCIAL

## 2022-10-05 PROCEDURE — 97014 ELECTRIC STIMULATION THERAPY: CPT | Performed by: PHYSICAL THERAPIST

## 2022-10-05 PROCEDURE — 97110 THERAPEUTIC EXERCISES: CPT | Performed by: PHYSICAL THERAPIST

## 2022-10-05 NOTE — PROGRESS NOTES
PT DAILY TREATMENT NOTE 2-15    Patient Name: Annamarie Segura  Date:10/5/2022  : 1975  [x]  Patient  Verified  Payor: [address] / Plan: [address] / Product Type: Managed Care Medicaid /    In time: 10:00  Out time: 11:00  Total Treatment Time (min): 60  Visit #:  5    Treatment Area: Headache [R51.9]  Neck pain [M54.2]    SUBJECTIVE  Pain Level (0-10 scale): 0- sinus pressure   Any medication changes, allergies to medications, adverse drug reactions, diagnosis change, or new procedure performed?: [x] No    [] Yes (see summary sheet for update)  Subjective functional status/changes:   [] No changes reported  Patient reports that the neck pain is similar to last week. It is not constant, but she has not seen complete relief yet.     OBJECTIVE    Modality rationale: decrease pain and increase tissue extensibility to improve the patients ability to drive and perform ADL's    Min Type Additional Details      15 [x] Estim: []Att   [x]Unatt    []TENS instruct                  [x]IFC  []Premod   []NMES                     []Other:  []w/US   []w/ice   [x]w/heat  Position: seated  Location: cervical        []  Traction: [] Cervical       []Lumbar                       [] Prone          []Supine                       []Intermittent   []Continuous Lbs:  [] before manual  [] after manual  []w/heat    []  Ultrasound: []Continuous   [] Pulsed                       at: []1MHz   []3MHz Location:  W/cm2:    [] Paraffin         Location:   []w/heat    []  Ice     [x]  Heat  []  Ice massage Position: supine  Location: cervical     []  Laser  []  Other: Position:  Location:      [x]  Vasopneumatic Device Pressure:       [x] lo [] med [] hi   Temperature: 34     [x] Skin assessment post-treatment:  [x]intact []redness- no adverse reaction    []redness - adverse reaction:         45 min Therapeutic Exercise:  [x] See flow sheet :   Rationale: increase ROM and increase strength to improve the patients ability to drive and perform ADL's               With   [] TE   [] TA   [] Neuro   [] SC   [] other: Patient Education: [x] Review HEP    [] Progressed/Changed HEP based on:   [] positioning   [] body mechanics   [] transfers   [] heat/ice application    [] other:      Other Objective/Functional Measures:   Cervical AROM rotation: WNL in both directions    Pain Level (0-10 scale) post treatment: 0    ASSESSMENT/Changes in Function:   Excellent improvement in ROM compared to initial evaluation. Patient will continue to benefit from skilled PT services to modify and progress therapeutic interventions, address functional mobility deficits, address ROM deficits, address strength deficits, analyze and address soft tissue restrictions, analyze and cue movement patterns, analyze and modify body mechanics/ergonomics, assess and modify postural abnormalities, and instruct in home and community integration to attain remaining goals. []  See Plan of Care  []  See progress note/recertification  []  See Discharge Summary         Progress towards goals / Updated goals:  Steady progress at this time towards short term goals.     PLAN  [x]  Upgrade activities as tolerated     [x]  Continue plan of care  [x]  Update interventions per flow sheet       []  Discharge due to:_  []  Other:_      Heaven Adjutant, PT 10/5/2022

## 2022-10-12 ENCOUNTER — HOSPITAL ENCOUNTER (OUTPATIENT)
Dept: PHYSICAL THERAPY | Age: 47
Discharge: HOME | End: 2022-10-12
Payer: COMMERCIAL

## 2022-10-12 PROCEDURE — 97014 ELECTRIC STIMULATION THERAPY: CPT

## 2022-10-12 PROCEDURE — 97110 THERAPEUTIC EXERCISES: CPT

## 2022-10-12 NOTE — PROGRESS NOTES
PT DAILY TREATMENT NOTE 2-15    Patient Name: Clare Baca  Date:10/12/2022  : 1975  [x]  Patient  Verified  Payor: [address] / Plan: [address] / Product Type: Managed Care Medicaid /    In time: 7:00a  Out time: 8:00a  Total Treatment Time (min): 60  Visit #:  6    Treatment Area: Headache [R51.9]  Neck pain [M54.2]    SUBJECTIVE  Pain Level (0-10 scale): 0ju  Any medication changes, allergies to medications, adverse drug reactions, diagnosis change, or new procedure performed?: [x] No    [] Yes (see summary sheet for update)  Subjective functional status/changes:   [] No changes reported  Patient reports she is feeling really good and is happy with her progress.     OBJECTIVE    Modality rationale: decrease pain and increase tissue extensibility to improve the patients ability to drive and perform ADL's    Min Type Additional Details      15 [x] Estim: []Att   [x]Unatt    []TENS instruct                  [x]IFC  []Premod   []NMES                     []Other:  []w/US   []w/ice   [x]w/heat  Position: seated  Location: cervical        []  Traction: [] Cervical       []Lumbar                       [] Prone          []Supine                       []Intermittent   []Continuous Lbs:  [] before manual  [] after manual  []w/heat    []  Ultrasound: []Continuous   [] Pulsed                       at: []1MHz   []3MHz Location:  W/cm2:    [] Paraffin         Location:   []w/heat    []  Ice     [x]  Heat  []  Ice massage Position: supine  Location: cervical     []  Laser  []  Other: Position:  Location:      [x]  Vasopneumatic Device Pressure:       [x] lo [] med [] hi   Temperature: 34     [x] Skin assessment post-treatment:  [x]intact []redness- no adverse reaction    []redness - adverse reaction:         45 min Therapeutic Exercise:  [x] See flow sheet :   Rationale: increase ROM and increase strength to improve the patients ability to drive and perform ADL's With   [] TE   [] TA   [] Neuro   [] SC   [] other: Patient Education: [x] Review HEP    [] Progressed/Changed HEP based on:   [] positioning   [] body mechanics   [] transfers   [] heat/ice application    [] other:      Other Objective/Functional Measures:   Min cues for control with horizontal abduction    Pain Level (0-10 scale) post treatment: 0    ASSESSMENT/Changes in Function:   Excellent improvement in ROM compared to initial evaluation. Patient will continue to benefit from skilled PT services to modify and progress therapeutic interventions, address functional mobility deficits, address ROM deficits, address strength deficits, analyze and address soft tissue restrictions, analyze and cue movement patterns, analyze and modify body mechanics/ergonomics, assess and modify postural abnormalities, and instruct in home and community integration to attain remaining goals. []  See Plan of Care  []  See progress note/recertification  []  See Discharge Summary         Progress towards goals / Updated goals:  Patient demonstrates great progress since starting therapy with no pain and improved AROM noted. Patient making great progress towards discharge.     PLAN  [x]  Upgrade activities as tolerated     [x]  Continue plan of care  [x]  Update interventions per flow sheet       []  Discharge due to:_  []  Other:_      Carlo Mark, MAURA 10/12/2022

## 2022-10-14 ENCOUNTER — HOSPITAL ENCOUNTER (OUTPATIENT)
Dept: MAMMOGRAPHY | Age: 47
Discharge: HOME | End: 2022-10-14
Payer: COMMERCIAL

## 2022-10-14 DIAGNOSIS — Z12.31 VISIT FOR SCREENING MAMMOGRAM: ICD-10-CM

## 2022-10-14 PROCEDURE — 77067 SCR MAMMO BI INCL CAD: CPT

## 2022-10-17 ENCOUNTER — OFFICE VISIT (OUTPATIENT)
Dept: FAMILY MEDICINE CLINIC | Age: 47
End: 2022-10-17
Payer: COMMERCIAL

## 2022-10-17 VITALS
DIASTOLIC BLOOD PRESSURE: 77 MMHG | TEMPERATURE: 97.6 F | HEIGHT: 64 IN | SYSTOLIC BLOOD PRESSURE: 124 MMHG | HEART RATE: 59 BPM | OXYGEN SATURATION: 100 % | BODY MASS INDEX: 39.61 KG/M2 | RESPIRATION RATE: 20 BRPM | WEIGHT: 232 LBS

## 2022-10-17 DIAGNOSIS — G43.109 MIGRAINE WITH AURA AND WITHOUT STATUS MIGRAINOSUS, NOT INTRACTABLE: Primary | ICD-10-CM

## 2022-10-17 DIAGNOSIS — G44.229 CHRONIC TENSION-TYPE HEADACHE, NOT INTRACTABLE: ICD-10-CM

## 2022-10-17 PROCEDURE — 99214 OFFICE O/P EST MOD 30 MIN: CPT | Performed by: FAMILY MEDICINE

## 2022-10-17 RX ORDER — SUMATRIPTAN 100 MG/1
TABLET, FILM COATED ORAL
Qty: 9 TABLET | Refills: 1 | Status: SHIPPED | OUTPATIENT
Start: 2022-10-17

## 2022-10-17 NOTE — PROGRESS NOTES
Chief Complaint   Patient presents with    Headache     Follow up- in past 2 months pt has had 10 headaches, 1 in past 1 week

## 2022-10-18 ENCOUNTER — HOSPITAL ENCOUNTER (OUTPATIENT)
Dept: PHYSICAL THERAPY | Age: 47
Discharge: HOME | End: 2022-10-18
Payer: COMMERCIAL

## 2022-10-18 PROCEDURE — 97014 ELECTRIC STIMULATION THERAPY: CPT

## 2022-10-18 PROCEDURE — 97110 THERAPEUTIC EXERCISES: CPT

## 2022-10-18 NOTE — PROGRESS NOTES
PT DAILY TREATMENT NOTE 2-15    Patient Name: Mandy Bui  Date:10/18/2022  : 1975  [x]  Patient  Verified  Payor: [address] / Plan: [address] / Product Type: Managed Care Medicaid /    In time: 8:30 a  Out time: 9:30 a  Total Treatment Time (min): 60  Visit #:  7    Treatment Area: Headache [R51.9]  Neck pain [M54.2]    SUBJECTIVE  Pain Level (0-10 scale): 3  Any medication changes, allergies to medications, adverse drug reactions, diagnosis change, or new procedure performed?: [x] No    [] Yes (see summary sheet for update)  Subjective functional status/changes:   [] No changes reported  Patient reports that she saw her PCP yesterday who put in a referral for neurology for her headaches. She reports that today is a worse day as she got a headache while driving in.      OBJECTIVE    Modality rationale: decrease pain and increase tissue extensibility to improve the patients ability to drive and perform ADL's    Min Type Additional Details      15 [x] Estim: []Att   [x]Unatt    []TENS instruct                  [x]IFC  []Premod   []NMES                     []Other:  []w/US   []w/ice   [x]w/heat  Position: seated  Location: cervical        []  Traction: [] Cervical       []Lumbar                       [] Prone          []Supine                       []Intermittent   []Continuous Lbs:  [] before manual  [] after manual  []w/heat    []  Ultrasound: []Continuous   [] Pulsed                       at: []1MHz   []3MHz Location:  W/cm2:    [] Paraffin         Location:   []w/heat    []  Ice     [x]  Heat  []  Ice massage Position: supine  Location: cervical     []  Laser  []  Other: Position:  Location:      []  Vasopneumatic Device Pressure:       [x] lo [] med [] hi   Temperature: 34     [x] Skin assessment post-treatment:  [x]intact []redness- no adverse reaction    []redness - adverse reaction:         45 min Therapeutic Exercise:  [x] See flow sheet :   Rationale: increase ROM and increase strength to improve the patients ability to drive and perform ADL's               With   [] TE   [] TA   [] Neuro   [] SC   [] other: Patient Education: [x] Review HEP    [] Progressed/Changed HEP based on:   [] positioning   [] body mechanics   [] transfers   [] heat/ice application    [] other:      Other Objective/Functional Measures:   Patient noting relief of HA sx following UBE warm up and cervical/shoulder stretching     TTP and tension L UT    Requires cues for performance of rows     Pain Level (0-10 scale) post treatment: 0    ASSESSMENT/Changes in Function:   Improving independence with performance of exercises with correct form but continues to require verbal and tactile cues for correct muscle activation. Patient will continue to benefit from skilled PT services to modify and progress therapeutic interventions, address functional mobility deficits, address ROM deficits, address strength deficits, analyze and address soft tissue restrictions, analyze and cue movement patterns, analyze and modify body mechanics/ergonomics, assess and modify postural abnormalities, and instruct in home and community integration to attain remaining goals. []  See Plan of Care  []  See progress note/recertification  []  See Discharge Summary         Progress towards goals / Updated goals:  Reduction of headache by end of session, patient demonstrating improved independence with exercises. Progress note next session.      PLAN  [x]  Upgrade activities as tolerated     [x]  Continue plan of care  [x]  Update interventions per flow sheet       []  Discharge due to:_  []  Other:_      Melanie Quigley DPT 10/18/2022

## 2022-10-25 ENCOUNTER — HOSPITAL ENCOUNTER (OUTPATIENT)
Dept: PHYSICAL THERAPY | Age: 47
Discharge: HOME | End: 2022-10-25
Payer: COMMERCIAL

## 2022-10-25 PROCEDURE — 97014 ELECTRIC STIMULATION THERAPY: CPT

## 2022-10-25 PROCEDURE — 97110 THERAPEUTIC EXERCISES: CPT

## 2022-10-25 NOTE — THERAPY DISCHARGE
Physical Therapy at [facility],   a part of  [facility]  [address] [facility] Jhon Guerrero  Phone: [phone]  Fax: [phone]    Medicaid Discharge Summary  2-15    Patient name: Mendel Plater  : 1975  Provider#: [id number]  Referral source: Keysha Keys MD      Medical/Treatment Diagnosis: Headache [R51.9]  Neck pain [M54.2]     Prior Hospitalization: see medical history     Comorbidities: See Plan of Care  Prior Level of Function:See Plan of Care  Medications: Verified on Patient Summary List    Start of Care: 21      Onset Date:Exacerbation of chronic HA pain, new onset of cervical pain in 2022    Visits from Start of Care: 8    Missed Visits: 0  Reporting Period : 22 to 10/25/22      ASSESSMENT/SUMMARY OF CARE: At time of discharge, patient has met all short and long term goals. She reports significant reduction in intensity/ frequency of HA's and decreased utilization of medication for cervical pain. She continues to report migraines with decent control and plans to follow up with neurology to address this complaint. She demonstrates pain free cervical and shoulder AROM WNL. She improved her FOTO outcome measure for cervical function from 35% at initial evaluation to 71% today and has maximized therapeutic benefit at this time. Short Term Goals: To be accomplished in 4 weeks:  Patient will be independent with initial HEP in order to transition to general wellness program. MET  Patient will demonstrate cervical extension to 15 degrees or better to ease looking into high cabinets. MET  Patient will report worst cervical pain of 7/10 or better to increase QOL. MET     Long Term Goals: To be accomplished in 12 weeks:  Patient will report worst cervical pain no greater than 2/10 to increase QOL. MET  Patient will report 50% reduction in HA occurrence and frequency to ease caring for her grandson.  MET  Patient will demonstrate full cervical AROM WNL to ease driving. MET  Patient will demonstrate B shoulder flexion WNL to ease reaching for cooking equipment overhead. MET        RECOMMENDATIONS:  [x]Discontinue therapy: [x]Patient has reached or is progressing toward set goals      []Patient is non-compliant or has abdicated      []Due to lack of appreciable progress towards set goals      []Paris De DPT 10/25/2022     ______________________________________________________________________    NOTE TO PHYSICIAN:  Please complete the following and fax to:  Physical Therapy at [facility],   a part of [address]: [phone]  Retain this original for your records. If you are unable to process this request in 24 hours, please contact our office.        Physician's Signature:____________________  Date:____________Time:_________           Stefanie Jimenez MD

## 2022-10-25 NOTE — PROGRESS NOTES
PT DAILY TREATMENT NOTE 2-15    Patient Name: Annamarie Segura  Date:10/25/2022  : 1975  [x]  Patient  Verified  [facility] [facility] [address] / Plan: [address] / Product Type: Managed Care Medicaid /    In time: 8:35 a  Out time: 9:45 a  Total Treatment Time (min): 70  Visit #:  8    Treatment Area: Headache [R51.9]  Neck pain [M54.2]    SUBJECTIVE  Pain Level (0-10 scale): 0  Any medication changes, allergies to medications, adverse drug reactions, diagnosis change, or new procedure performed?: [x] No    [] Yes (see summary sheet for update)  Subjective functional status/changes:   [] No changes reported  Patient reports that her head is feeling okay this morning. \"Yesterday was rough but today is much better. \"  She has not been able to make contact with the neurologist yet to schedule her appointment.      OBJECTIVE    Modality rationale: decrease pain and increase tissue extensibility to improve the patients ability to drive and perform ADL's    Min Type Additional Details      15 [x] Estim: []Att   [x]Unatt    []TENS instruct                  [x]IFC  []Premod   []NMES                     []Other:  []w/US   []w/ice   [x]w/heat  Position: supine  Location: cervical        []  Traction: [] Cervical       []Lumbar                       [] Prone          []Supine                       []Intermittent   []Continuous Lbs:  [] before manual  [] after manual  []w/heat    []  Ultrasound: []Continuous   [] Pulsed                       at: []1MHz   []3MHz Location:  W/cm2:    [] Paraffin         Location:   []w/heat    []  Ice     [x]  Heat  []  Ice massage Position: supine  Location: cervical     []  Laser  []  Other: Position:  Location:      []  Vasopneumatic Device Pressure:       [x] lo [] med [] hi   Temperature: 34     [x] Skin assessment post-treatment:  [x]intact []redness- no adverse reaction    []redness - adverse reaction:         55 min Therapeutic Exercise:  [x] See flow sheet : Rationale: increase ROM and increase strength to improve the patients ability to drive and perform ADL's               With   [] TE   [] TA   [] Neuro   [] SC   [] other: Patient Education: [x] Review HEP    [] Progressed/Changed HEP based on:   [] positioning   [] body mechanics   [] transfers   [] heat/ice application    [] other:      Other Objective/Functional Measures:     Cervical AROM:                                                                             R                      L                        Flexion                                    30                                                                                  Extension                                44                                                                                    Side Bending                          35                      31                                                                     Rotation                                   70                      75     Upper Extremity AROM:                                                         R                      L                                                                                    Flexion                                     180  180    No hypertonicity or tenderness noted throughout UT/LS or cervical paraspinals     FOTO: 71%, improved from 34%    Pain Level (0-10 scale) post treatment: 0    ASSESSMENT/Changes in Function:      []  See Plan of Care  []  See progress note/recertification  [x]  See Discharge Summary         Progress towards goals / Updated goals:    Short Term Goals: To be accomplished in 4 weeks:  Patient will be independent with initial HEP in order to transition to general wellness program. MET  Patient will demonstrate cervical extension to 15 degrees or better to ease looking into high cabinets. MET  Patient will report worst cervical pain of 7/10 or better to increase QOL. MET     Long Term Goals:  To be accomplished in 12 weeks:  Patient will report worst cervical pain no greater than 2/10 to increase QOL. MET  Patient will report 50% reduction in HA occurrence and frequency to ease caring for her grandson. MET  Patient will demonstrate full cervical AROM WNL to ease driving. MET  Patient will demonstrate B shoulder flexion WNL to ease reaching for cooking equipment overhead.  MET    PLAN  []  Upgrade activities as tolerated     []  Continue plan of care  []  Update interventions per flow sheet       [x]  Discharge due to: goals met   []  Other:_      Levi Sams DPT 10/25/2022

## 2022-10-31 ENCOUNTER — TELEPHONE (OUTPATIENT)
Dept: RHEUMATOLOGY | Age: 47
End: 2022-10-31

## 2022-10-31 DIAGNOSIS — M06.09 SERONEGATIVE RHEUMATOID ARTHRITIS OF MULTIPLE SITES (HCC): ICD-10-CM

## 2022-10-31 DIAGNOSIS — Z79.60 LONG-TERM USE OF IMMUNOSUPPRESSANT MEDICATION: ICD-10-CM

## 2022-11-02 RX ORDER — FOLIC ACID 1 MG/1
1 TABLET ORAL DAILY
Qty: 90 TABLET | Refills: 5 | Status: SHIPPED | OUTPATIENT
Start: 2022-11-02

## 2022-11-14 DIAGNOSIS — M06.4 UNDIFFERENTIATED INFLAMMATORY ARTHRITIS (HCC): ICD-10-CM

## 2022-11-14 NOTE — TELEPHONE ENCOUNTER
Received refill request for methotrexate 2.5mg tablet    Last visit was 8/11/22  Follow up for 12/12/22   Contains abnormal data METABOLIC PANEL, COMPREHENSIVE  Order: [id number]  Collected 9/8/2022 09:40    Status: Final result    Next appt: 12/12/2022 at 08:00 AM in Rheumatology Yvon Rivera MD)    0 Result Notes  Component Ref Range & Units 9/8/22 0940 8/11/22 1033 5/2/22 0727 4/12/22 1457 1/10/22 0836 10/29/21 1012 7/28/21 0813   Sodium 136 - 145 mmol/L 140  141 R  141 R  139  139 R  139  138 R    Potassium 3.5 - 5.1 mmol/L 4.0  4.8 R  4.8 R  4.0  4.5 R  4.8  4.1 R    Chloride 97 - 108 mmol/L 113 High   109 High  R  103 R  105  102 R  108  102 R    CO2 21 - 32 mmol/L 21  19 Low  R  25 R  27  24 R  28  23 R    Anion gap 5 - 15 mmol/L 6    7   3 Low      Glucose 65 - 100 mg/dL 90  92 R  85 R  88  95 R  88  86 R    BUN 6 - 20 MG/DL 9  13 R  12 R  12  7 R  8  10 R    Creatinine 0.55 - 1.02 MG/DL 0.84  0.84 R  0.83 R  0.96  0.73 R  0.74  0.80 R    BUN/Creatinine ratio 12 - 20   11 Low   15 R  14 R  13  10 R  11 Low   13 R    GFR est AA >60 ml/min/1.73m2 >60    >60  113 R, CM  >60  102 R, CM    GFR est non-AA >60 ml/min/1.73m2 >60    >60 CM  98 R  >60 CM  89 R    Comment: Estimated GFR is calculated using the IDMS-traceable Modification of Diet in Renal Disease (MDRD) Study equation, reported for both  Americans (GFRAA) and non- Americans (GFRNA), and normalized to 1.73m2 body surface area. The physician must decide which value applies to the patient. Calcium 8.5 - 10.1 MG/DL 8.9  9.0 R  9.3 R  9.4  9.3 R  9.4  9.0 R    Bilirubin, total 0.2 - 1.0 MG/DL 0.3  0.3 R  0.3 R  0.3  0.4 R  0.5  0.5 R    ALT (SGPT) 12 - 78 U/L 23  11 R  16 R  19  18 R  27  13 R    AST (SGOT) 15 - 37 U/L 33  26 R  22 R  27  28 R  43 High   29 R    Alk.  phosphatase 45 - 117 U/L 92  108 R  117 R  111  110 R, CM  109  93 R    Protein, total 6.4 - 8.2 g/dL 7.3  6.9 R  7.3 R  7.8  7.0 R  7.2  6.6 R    Albumin 3.5 - 5.0 g/dL 3.7  4.3 R  4.3 R  3.7  4.4 R  3.7  4.1 R    Globulin 2.0 - 4.0 g/dL 3.6    4.1 High    3.5     A-G Ratio 1.1 - 2.2   1.0 Low   1.7 R  1.4 R  0.9 Low   1.7        Contains abnormal data CBC WITH AUTOMATED DIFF  Order: [id number]  Collected 9/8/2022 09:40    Status: Final result    Next appt: 12/12/2022 at 08:00 AM in Rheumatology Kait Mortensen MD)    0 Result Notes  Component Ref Range & Units 9/8/22 0940 8/11/22 1033 5/2/22 0727 4/12/22 1457 1/10/22 0836 10/29/21 1012 7/28/21 0813   WBC 3.6 - 11.0 K/uL 4.4  4.1 R  5.3 R  5.2  6.3 R  3.9  4.7 R    RBC 3.80 - 5.20 M/uL 3.63 Low   3.78 R  3.79 R  3.80  3.68 Low  R  3.47 Low   3.39 Low  R    HGB 11.5 - 16.0 g/dL 10.2 Low   10.7 Low  R  10.6 Low  R  11.1 Low   10.2 Low  R  10.1 Low   9.8 Low  R    HCT 35.0 - 47.0 % 32.4 Low   33.5 Low  R  34.2 R  35.1  32.7 Low  R  32.3 Low   30.8 Low  R    MCV 80.0 - 99.0 FL 89.3  89 R  90 R  92.4  89 R  93.1  91 R    MCH 26.0 - 34.0 PG 28.1  28.3 R  28.0 R  29.2  27.7 R  29.1  28.9 R    MCHC 30.0 - 36.5 g/dL 31.5  31.9 R  31.0 Low  R  31.6  31.2 Low  R  31.3  31.8 R    RDW 11.5 - 14.5 % 17.1 High   17.0 High  R  16.7 High  R  16.8 High   17.0 High  R  16.7 High   17.7 High  R    PLATELET 175 - 998 K/uL 238  220 R  270 R  276  253 R  254  204 R    MPV 8.9 - 12.9 FL 11.2    11.1   10.7     NRBC 0  WBC 0.0    0.0   0.0     ABSOLUTE NRBC 0.00 - 0.01 K/uL 0.00    0.00   0.00     NEUTROPHILS 32 - 75 % 69  70 R  81 R  76 High   78 R  74  77 R    LYMPHOCYTES 12 - 49 % 21    16   20     MONOCYTES 5 - 13 % 8  7 R  5 R  7  5 R  4 Low   7 R    EOSINOPHILS 0 - 7 % 1  1 R  1 R  1  1 R  1  1 R    BASOPHILS 0 - 1 % 1  0 R  1 R  0  1 R  0  0 R    IMMATURE GRANULOCYTES 0.0 - 0.5 % 0  0 R  0 R  0  0 R  1 High   0 R    ABS. NEUTROPHILS 1.8 - 8.0 K/UL 3.0  2.9 R  4.3 R  3.9  5.0 R  2.9  3.6 R    ABS. LYMPHOCYTES 0.8 - 3.5 K/UL 0.9    0.9   0.8     ABS. MONOCYTES 0.0 - 1.0 K/UL 0.4  0.3 R  0.3 R  0.4  0.3 R  0.2  0.3 R    ABS.  EOSINOPHILS 0.0 - 0.4 K/UL 0.0 0.0 R  0.0 R  0.0  0.1 R  0.0  0.1 R    ABS. BASOPHILS 0.0 - 0.1 K/UL 0.0  0.0 R  0.0 R  0.0  0.0 R  0.0  0.0 R    ABS. IMM. GRANS. 0.00 - 0.04 K/UL 0.0  0.0 R  0.0 R  0.0  0.0 R  0.

## 2022-11-15 RX ORDER — METHOTREXATE 2.5 MG/1
20 TABLET ORAL
Qty: 40 TABLET | Refills: 0 | Status: SHIPPED | OUTPATIENT
Start: 2022-11-15

## 2022-12-05 NOTE — TELEPHONE ENCOUNTER
Last visit 8/11/22  Labs done 9/8/22  Follow up scheduled for 12/12/22
Patient call stated she needs rx refill on Folic Acid 1 mg sent over to [address] [place] [address] at [facility] [phone] patient stated she checked her [employer] [facility]and rx still have  listed please call pt and advise.  Sdh
No difficulties

## 2022-12-06 DIAGNOSIS — G43.109 MIGRAINE WITH AURA AND WITHOUT STATUS MIGRAINOSUS, NOT INTRACTABLE: ICD-10-CM

## 2022-12-06 RX ORDER — TOPIRAMATE 100 MG/1
100 TABLET, FILM COATED ORAL
Qty: 90 TABLET | Refills: 3 | Status: SHIPPED | OUTPATIENT
Start: 2022-12-06

## 2022-12-12 ENCOUNTER — OFFICE VISIT (OUTPATIENT)
Dept: RHEUMATOLOGY | Age: 47
End: 2022-12-12
Payer: COMMERCIAL

## 2022-12-12 VITALS
OXYGEN SATURATION: 98 % | DIASTOLIC BLOOD PRESSURE: 70 MMHG | BODY MASS INDEX: 38.07 KG/M2 | WEIGHT: 223 LBS | RESPIRATION RATE: 16 BRPM | TEMPERATURE: 98 F | HEART RATE: 68 BPM | SYSTOLIC BLOOD PRESSURE: 118 MMHG | HEIGHT: 64 IN

## 2022-12-12 DIAGNOSIS — M06.09 SERONEGATIVE RHEUMATOID ARTHRITIS OF MULTIPLE SITES (HCC): Primary | ICD-10-CM

## 2022-12-12 DIAGNOSIS — M06.4 UNDIFFERENTIATED INFLAMMATORY ARTHRITIS (HCC): ICD-10-CM

## 2022-12-12 DIAGNOSIS — Z79.60 LONG-TERM USE OF IMMUNOSUPPRESSANT MEDICATION: ICD-10-CM

## 2022-12-12 PROCEDURE — 99214 OFFICE O/P EST MOD 30 MIN: CPT | Performed by: INTERNAL MEDICINE

## 2022-12-12 RX ORDER — METHOTREXATE 2.5 MG/1
15 TABLET ORAL
Qty: 30 TABLET | Refills: 2 | Status: SHIPPED | OUTPATIENT
Start: 2022-12-13

## 2022-12-12 NOTE — PROGRESS NOTES
REASON FOR VISIT    This is an in-office Rheumatology follow-up visit for Ms. Medeiros for     ICD-10-CM   1. Seronegative rheumatoid arthritis of multiple sites ([facility]  M06.09     Inflammatory arthritis phenotype includes:  Anti-CCP positive: no  Rheumatoid factor positive: no  HLA-B27 positive: no  Erosive disease: N/A  Extra-articular manifestations include: secondary fibromyalgia     Immunosuppression Screening (7/28/2021): Quantiferon TB: negative  PPD:  Not performed  Hepatitis B: negative  Hepatitis C: negative    Therapy History includes:  Current DMARD therapy include: methotrexate 20 mg every Tuesday (10/27/2020 to present), Jana Duffel 11 mg XR daily (3/05/2021 to present)  Prior DMARD therapy include: methotrexate 15 mg every Tuesday (8/11/2020 to 10/27/2020)  Discontinued DMARDs because of inefficacy: None  Discontinued DMARDs because of side effects: None    HISTORY OF PRESENT ILLNESS  Ms. Yeny Estrada returns for a follow-up. LV 8/11/2022. She uses a cane when she is out of the house. Pt takes one pill of Xeljanz daily and 8 pills of Methotrexate once per week with daily folic acid. Pt notes that she is having a lot of issues with pain in her L hand, between the thumb MCP and CMC joint. She says that it is hard for her to  objects at times because of the increasing pain and weakness. She says that she has a little numbness on the tips of her middle fingers at time, but the neuropathy is mild. Her main joint complaint is pain and throbbing in her L thumb. She has a spica splint that she uses sometimes to immobilize her thumb. She denies ever using voltaren gel. Pt has pain in her knees, especially at night. Pt notes of tenderness throughout most of her back. Pt still struggles with migraines. She takes Topamax as a preventative medicine. Pt notes that she has cut out a lot of sugary foods from her diet and is trying to be more active.      REVIEW OF SYSTEMS    A comprehensive review of systems was performed and pertinent results are documented in the HPI, review of systems is otherwise non-contributory. PAST MEDICAL HISTORY    She has a past medical history of Chronic tension-type headache, not intractable (8/22/2022), DDD (degenerative disc disease), lumbosacral, Migraine, Osteoarthritis, Renal stones, Sciatica, and Tubular adenoma of colon (6/23/2020). FAMILY HISTORY    Her family history includes Arthritis-rheumatoid in her father; Gout in her brother and mother; No Known Problems in her sister, son, and another family member. SOCIAL HISTORY    She reports that she quit smoking about 8 years ago. Her smoking use included cigarettes. She smoked an average of .5 packs per day. She has never used smokeless tobacco. She reports that she does not currently use alcohol after a past usage of about 3.3 standard drinks per week. She reports that she does not use drugs. MEDICATIONS    Current Outpatient Medications   Medication Sig    topiramate (TOPAMAX) 100 mg tablet Take 1 Tablet by mouth nightly. methotrexate (RHEUMATREX) 2.5 mg tablet Take 8 Tablets by mouth every Tuesday. folic acid (FOLVITE) 1 mg tablet Take 1 Tablet by mouth daily. SUMAtriptan (IMITREX) 100 mg tablet TAKE ONE TABLET BY MOUTH AT ONSET OF HEADACHE FOR ONE DOSE; MAY REPEAT ONE TABLET IN 2 HOURS IF NEEDED. *MAX DAILY AMOUNT IS 2 TABLETS    methocarbamoL (ROBAXIN) 500 mg tablet Take 1 Tablet by mouth four (4) times daily as needed for PRN Reason (Other) (head and neck pressure). ergocalciferol (ERGOCALCIFEROL) 1,250 mcg (50,000 unit) capsule Take 1 Capsule by mouth every seven (7) days. tofacitinib 11 mg Tb24 Take 11 mg by mouth daily. Indications: rheumatoid arthritis     No current facility-administered medications for this visit.      ALLERGIES    Allergies   Allergen Reactions    Sulfa (Sulfonamide Antibiotics) Rash       PHYSICAL EXAMINATION    Visit Vitals  /70 (BP 1 Location: Left upper arm, BP Patient Position: Sitting, BP Cuff Size: Adult long)   Pulse 68   Temp 98 °F (36.7 °C) (Oral)   Resp 16   Ht 5' 4\" (1.626 m)   Wt 223 lb (101.2 kg)   SpO2 98%   BMI 38.28 kg/m²     General:  The patient is well developed, well nourished, alert, and in no apparent distress. Eyes: Sclera are anicteric. No conjunctival injection. HEENT:  Oropharynx is clear. No oral ulcers. Adequate salivary pooling. No cervical or supraclavicular lymphadenopathy. Lungs:  Clear to auscultation bilaterally, without wheeze or stridor. Normal respiratory effort. Cor:  Regular rate and rhythm. No murmur rub or gallop. Abdomen: Soft, non-tender, without hepatomegaly or masses. Extremities: No calf tenderness. Warm and well perfused. Skin: Post surgical L CMC scar. Still no petechial, purpuric, or psoriaform rashes. Neuro: Nonfocal  Musculoskeletal:  Stable crepitus in bilateral shoulders. Return of left CMC and MCP1 tenderness in left hand without associated synovitis. DATA REVIEW    Laboratory   11/21/22: Cholesterol 222, , ESR 37, Cr 0.79, Alk phos 123, AST 25, ALT 12, Tbili 0.4, CBC WNL, CRP 13/mg/L  9/8/22: NT proBNP 135, Troponin-High sensitivity 4, Cr 0.84, LFT WNL, WBC 4.4, HGB 10.2, Plt 238  5/2/22: cholesterol 221, , ESR 10, Cr 0.83, LFT WNL, WBC 5.3, HGB 10.6, Plt 270, CRP 12 mg/L  1/10/22: Cr 0.73, LFT WNL, WBC 6.3, HGB 10.2, Plt 253,   Recent laboratory results were reviewed, summarized, and discussed with the patient. 10/29/21: CRP 2.19, ESR 36    Imaging    Musculoskeletal Ultrasound    None    Radiographs    CHEST (9/8/22) : No acute cardiopulmonary process    Bilateral Knee 6/16/2013: overall alignment is normal. The joint spaces are preserved and only minimal arthritic changes are present, with tiny osteophytes present at the margins of the femorotibial compartments bilaterally. There is no evidence of joint effusion, erosions, or other features to suggest inflammatory arthritis.  Bilateral hand radiograph showed Alignment and bone mineralization is normal bilaterally. No significant arthritic changes are present. CT Imaging    CT ABD PELV W CONT (4/12/22): 1. No acute abdominal or pelvic pathology. 2. Punctate bilateral nonobstructing renal stones    CT Abdomen and PElvis without contrast 9/28/2020: LOWER THORAX: No significant abnormality in the incidentally imaged lower chest. LIVER: No mass. BILIARY TREE: Gallbladder is within normal limits. CBD is not dilated. SPLEEN: within normal limits. PANCREAS: No focal abnormality. ADRENALS: Unremarkable. KIDNEYS/URETERS: Small nonobstructing bilateral renal stones. STOMACH: Unremarkable. SMALL BOWEL: No dilatation or wall thickening. COLON: No dilatation or wall thickening. APPENDIX: Unremarkable. PERITONEUM: No ascites or pneumoperitoneum. RETROPERITONEUM: Multiple prominent lymph nodes in the upper abdomen including gastrohepatic ligament, periportal space, and periaortic marylu chains without significant change. REPRODUCTIVE ORGANS: Unremarkable URINARY BLADDER: No mass or calculus. BONES: No destructive bone lesion. ABDOMINAL WALL: No mass or hernia. MR Imaging    MRI Lumbar Spine without contrast 5/12/2014: Alignment: There are 5 nonrib-bearing lumbar type vertebra. There is a normal lumbar lordosis without listhesis. Intervertebral discs: There is L2 and L5 intervertebral disc desiccation with mild height loss at L2 and moderate height loss at the L5 level with mild bulging at L2 and more moderate bulging, anterior extrusion, and dorsal disc protrusion of the L5 disc level. Vertebral bodies/ marrow: There is endplate Modic type II signal demonstrated at the T12, L2 and L5 levels. A small Schmorl's node is seen at the anterior, superior L1 vertebral level. There is no marrow signal to indicate a marrow replacement process or fracture. Conus and cauda equina: The conus terminates at the T12 level and demonstrates normal signal characteristics.  The nerve roots of the cauda equina are normal in appearance. Paravertebral soft tissues: There is no paraspinal mass or fluid collection identified. By level: L1-L2: No canal or neural foraminal stenosis. L2-L3: Mild concentric disc bulge canal or neural foraminal stenosis. L3-L4: There is mild facet osteoarthritis without canal or neural foraminal stenosis. L4-L5: There is a left foraminal disc protrusion without canal or neural foraminal stenosis. Mild facet osteoarthritis is also noted. L5-S1: There is a broad-based left paracentral disc protrusion which is not associated with canal or neural foraminal stenosis. MRI Lumbar Spine without contrast 6/01/2011: Lumbar spine alignment is within normal limits. Chronic Schmorl's node formation in the superior endplates of L1 and L3 and in the inferior  endplate of L5. No compression deformity. Mild multilevel disc desiccation. Conus medullaris terminates at T12-L1. Signal and caliber of the distal cord and conus are within normal limits. Paraspinal soft tissues are within normal limits. T12-L1: facet arthrosis. No herniation or stenosis. L1-L2: Facet arthrosis. No herniation or stenosis. L2-L3: Diffuse disc bulge, facet arthrosis, and thickened ligamentum flavum. Mild central spinal canal stenosis. L3-L4: Diffuse disc bulge, facet arthrosis, and thickened ligamentum flavum. No stenosis. L4-L5: Diffuse disc bulge, facet arthrosis, and thickened ligamentum flavum. No stenosis. L5-S1: Diffuse disc bulge, facet arthrosis, and thickened ligamentum flavum. Mild left foraminal stenosis. DXA     None    PROCEDURE     Left Knee Kenalog 40 mg IA. (10/29/2021)    ASSESSMENT AND PLAN  45yo with seronegative rheumatoid arthritis and secondary osteoarthritis without active inflammatory disease on the regimen of methotrexate and tofacitinib, with left CMC and bilateral knee pain which both appear to be osteoarthritic in nature.  Reviewed spica splint use, topical diclofenac, and OTC topical analgesics. For low-grade AlkPhos elevation, decreasing methotrexate to 6 pills weekly. It is possible that Aly Dena is also contributing to this as well as her moderately elevated cholesterol; will continue to explore stopping or replacing Aly Dena at next visit but first trending LFTs with the methotrexate reduction. Congratulated her weight loss. 1. Seronegative rheumatoid arthritis of multiple sites ([facility]  - C REACTIVE PROTEIN, QT; Future  - CBC WITH AUTOMATED DIFF; Future  - METABOLIC PANEL, COMPREHENSIVE; Future  - SED RATE (ESR); Future  - tofacitinib 11 mg Tb24; Take 11 mg by mouth daily. Indications: rheumatoid arthritis  Dispense: 90 Tablet; Refill: 1    2. Long-term use of immunosuppressant medication  - CBC WITH AUTOMATED DIFF; Future  - METABOLIC PANEL, COMPREHENSIVE; Future    3. Undifferentiated inflammatory arthritis ([facility])  - methotrexate (RHEUMATREX) 2.5 mg tablet; Take 6 Tablets by mouth every Tuesday. Dispense: 30 Tablet; Refill: 2         Patient Instructions   1) Decrease to 6 pills of Methotrexate once per week with daily folic acid. 2) Continue to take one pill of Xeljanz daily for now. 3) Continue to use a spica splint to immobilize your thumb. Also, try using diclofenac under the spica splint for additional pain relief. 4) You can take 650mg of Tylenol up to 3 times a day for joint pain. 5) Use topicals such as salon pas or Biofreeze on painful areas as needed. 6)  Continue to work on weight loss. Remember that every pound lost takes 4 pounds of pressure off of the knees. Weight loss mostly comes down to creating a caloric deficit through diet as opposed to exercise. 7) Check labs in 3 months. 8) Follow up in 3 moths after labs. Let me know if you have any questions or concerns in the meantime.        TODAY'S ORDERS    Orders Placed This Encounter    C REACTIVE PROTEIN, QT    CBC WITH AUTOMATED DIFF    METABOLIC PANEL, COMPREHENSIVE    SED RATE (ESR)    methotrexate (RHEUMATREX) 2.5 mg tablet    tofacitinib 11 mg Tb24         Future Appointments   Date Time Provider Sonia Amado   3/13/2023  8:00 AM Mark Alaniz MD [facility] BS AMB       Face to face time: 22 minutes  Note preparation and records review day of service: 11 minutes  Total provider time day of service: 33 minutes    This was scribed by Mihaela Blandon in the presence of Dr. Bony Orlando. The note was reviewed and amended personally, and I agree with the above information.     Alex Mcpherson MD    Adult Rheumatology   [facility]  A Part of [facility], [address] [place] [address] Road   Phone [phone]  Fax [phone]

## 2022-12-12 NOTE — PATIENT INSTRUCTIONS
1) Decrease to 6 pills of Methotrexate once per week with daily folic acid. 2) Continue to take one pill of Xeljanz daily for now. 3) Continue to use a spica splint to immobilize your thumb. Also, try using diclofenac under the spica splint for additional pain relief. 4) You can take 650mg of Tylenol up to 3 times a day for joint pain. 5) Use topicals such as salon pas or Biofreeze on painful areas as needed. 6)  Continue to work on weight loss. Remember that every pound lost takes 4 pounds of pressure off of the knees. Weight loss mostly comes down to creating a caloric deficit through diet as opposed to exercise. 7) Check labs in 3 months. 8) Follow up in 3 moths after labs. Let me know if you have any questions or concerns in the meantime.

## 2022-12-12 NOTE — PROGRESS NOTES
Medication has been routed to [facility]. Status update to follow as soon as possible.  Please call us with any questions at [phone]

## 2022-12-12 NOTE — PROGRESS NOTES
Chief Complaint   Patient presents with    Joint Pain     1. Have you been to the ER, urgent care clinic since your last visit? Hospitalized since your last visit? No    2. Have you seen or consulted any other health care providers outside of the [address] [place] [address] since your last visit? Include any pap smears or colon screening.  No

## 2022-12-15 NOTE — PROGRESS NOTES
[facility]  Dispensing Pharmacy: [address] [place] [address]    Anticipated Ship Date: 12/15/2022    Financial Assistance: No    Patient Copay: $0    Additional Notes: We have called the patient to confirm this info. Please call us with any questions at [phone].

## 2023-01-27 ENCOUNTER — HOSPITAL ENCOUNTER (EMERGENCY)
Age: 48
Discharge: HOME | End: 2023-01-27
Attending: EMERGENCY MEDICINE
Payer: COMMERCIAL

## 2023-01-27 VITALS
DIASTOLIC BLOOD PRESSURE: 82 MMHG | HEART RATE: 80 BPM | OXYGEN SATURATION: 100 % | WEIGHT: 225.09 LBS | RESPIRATION RATE: 22 BRPM | BODY MASS INDEX: 38.43 KG/M2 | TEMPERATURE: 97.9 F | HEIGHT: 64 IN | SYSTOLIC BLOOD PRESSURE: 150 MMHG

## 2023-01-27 DIAGNOSIS — K59.00 CONSTIPATION, UNSPECIFIED CONSTIPATION TYPE: ICD-10-CM

## 2023-01-27 DIAGNOSIS — K60.2 ANAL FISSURE: Primary | ICD-10-CM

## 2023-01-27 PROCEDURE — 99283 EMERGENCY DEPT VISIT LOW MDM: CPT

## 2023-01-27 RX ORDER — HYDROCORTISONE 25 MG/G
CREAM TOPICAL 4 TIMES DAILY
Qty: 30 G | Refills: 0 | Status: SHIPPED | OUTPATIENT
Start: 2023-01-27

## 2023-01-27 RX ORDER — AMOXICILLIN 250 MG
1 CAPSULE ORAL DAILY
Qty: 7 TABLET | Refills: 0 | Status: SHIPPED | OUTPATIENT
Start: 2023-01-27 | End: 2023-02-03

## 2023-01-27 NOTE — ED PROVIDER NOTES
42-year-old female presents from home with a complaint of anal pain. Patient states she had a large bowel movement a couple of days ago. Ever since then she has had pain in her anus. Is worsened when she has bowel movements and when she is wiping. She has had small hard stools since then. No diarrhea. She has been applying witch hazel but with no improvement. She does not take any stool softeners or laxatives. No fever, nausea, vomiting. States she has had hemorrhoids in the past but cannot feel any hemorrhoids at this time. No other complaints.        Past Medical History:   Diagnosis Date    Chronic tension-type headache, not intractable 2022    DDD (degenerative disc disease), lumbosacral     Migraine     Osteoarthritis     Renal stones     Sciatica     Tubular adenoma of colon 2020       Past Surgical History:   Procedure Laterality Date    COLONOSCOPY N/A 2020    COLONOSCOPY performed by Leticia Shrestha MD at [facility] ENDOSCOPY    HX  SECTION  1996    HX [address]    HX TUBAL LIGATION      HX WISDOM TEETH EXTRACTION      MD UNLISTED PROCEDURE BREAST      right breast biopsy - benign         Family History:   Problem Relation Age of Onset    Gout Mother     Arthritis-rheumatoid Father     No Known Problems Sister     Gout Brother     No Known Problems Other     No Known Problems Son        Social History     Socioeconomic History    Marital status:      Spouse name: Not on file    Number of children: Not on file    Years of education: Not on file    Highest education level: Not on file   Occupational History    Not on file   Tobacco Use    Smoking status: Former     Packs/day: 0.50     Types: Cigarettes     Quit date: 2014     Years since quittin.1    Smokeless tobacco: Never   Vaping Use    Vaping Use: Never used   Substance and Sexual Activity    Alcohol use: Not Currently     Alcohol/week: 3.3 standard drinks     Types: 4 Standard drinks or equivalent per week    Drug use: No    Sexual activity: Not Currently     Partners: Male   Other Topics Concern    Not on file   Social History Narrative    Not on file     Social Determinants of Health     Financial Resource Strain: Not on file   Food Insecurity: Not on file   Transportation Needs: Not on file   Physical Activity: Not on file   Stress: Not on file   Social Connections: Not on file   Intimate Partner Violence: Not on file   Housing Stability: Not on file         ALLERGIES: Sulfa (sulfonamide antibiotics)    Review of Systems   Constitutional:  Negative for fever. HENT:  Negative for facial swelling. Eyes:  Negative for visual disturbance. Respiratory:  Negative for chest tightness. Cardiovascular:  Negative for chest pain. Gastrointestinal:  Negative for abdominal pain. Genitourinary:  Negative for difficulty urinating and dysuria. Musculoskeletal:  Negative for arthralgias. Skin:  Negative for rash. Neurological:  Negative for headaches. Hematological:  Negative for adenopathy. Psychiatric/Behavioral:  Negative for suicidal ideas. Vitals:    01/27/23 1224   BP: (!) 150/82   Pulse: 80   Resp: 22   Temp: 97.9 °F (36.6 °C)   SpO2: 100%   Weight: 102.1 kg (225 lb 1.4 oz)   Height: 5' 4\" (1.626 m)            Physical Exam  Vitals and nursing note reviewed. Constitutional:       General: She is not in acute distress. Appearance: She is well-developed. HENT:      Head: Normocephalic and atraumatic. Eyes:      General: No scleral icterus. Conjunctiva/sclera: Conjunctivae normal.      Pupils: Pupils are equal, round, and reactive to light. Cardiovascular:      Rate and Rhythm: Normal rate. Heart sounds: No murmur heard. Pulmonary:      Effort: Pulmonary effort is normal. No respiratory distress. Abdominal:      General: There is no distension. Genitourinary:     Comments: No evidence of swelling or induration around the anus.   She does have evidence of an anal fissure. No active bleeding or evidence of hemorrhoid. Musculoskeletal:         General: Normal range of motion. Cervical back: Normal range of motion and neck supple. Skin:     General: Skin is warm and dry. Findings: No rash. Neurological:      Mental Status: She is alert and oriented to person, place, and time. Medical Decision Making  Assessment: Pain likely related to an anal fissure. Possibly resulting from constipation. No evidence of perianal abscess on exam.  Vital signs normal.  Exam otherwise reassuring. Patient can be discharged home to treat with steroid cream and stool softeners and laxatives. She was educated on the use of baby wipes as well as sitz bath's. Patient can follow-up with her gastroenterologist.  She can return to the ER with any new or worsening symptoms. Risk  OTC drugs. Prescription drug management.            Procedures

## 2023-01-27 NOTE — ED NOTES
The patient was discharged home in stable condition. The patient is alert and oriented, in no respiratory distress. The patient's diagnosis, condition and treatment were explained. The patient expressed understanding. Two  prescriptions escribed. No work/school note given. A discharge plan has been developed. A  was not involved in the process. Aftercare instructions were given. Pt ambulatory out of the ED.

## 2023-01-27 NOTE — ED TRIAGE NOTES
Pt ambulates to treatment area she states that on Monday she had to remove a large BM from her rectum. She states that all week the pain has been very bad, she called her GI doctor and they advised her to go to the ED. She has been using witch hazel pads to help but it has not. She does not feel any hemorrhoids but feels like everything got stretched. She has had BM since then, today  2 small BM that were hard. Not taking anything for constipation.

## 2023-03-13 ENCOUNTER — OFFICE VISIT (OUTPATIENT)
Dept: RHEUMATOLOGY | Age: 48
End: 2023-03-13
Payer: COMMERCIAL

## 2023-03-13 VITALS
SYSTOLIC BLOOD PRESSURE: 104 MMHG | DIASTOLIC BLOOD PRESSURE: 60 MMHG | TEMPERATURE: 98.7 F | WEIGHT: 224.2 LBS | HEART RATE: 77 BPM | BODY MASS INDEX: 38.48 KG/M2 | OXYGEN SATURATION: 97 % | RESPIRATION RATE: 16 BRPM

## 2023-03-13 DIAGNOSIS — M06.09 SERONEGATIVE RHEUMATOID ARTHRITIS OF MULTIPLE SITES (HCC): Primary | ICD-10-CM

## 2023-03-13 DIAGNOSIS — Z79.60 LONG-TERM USE OF IMMUNOSUPPRESSANT MEDICATION: ICD-10-CM

## 2023-03-13 DIAGNOSIS — E78.2 MIXED HYPERLIPIDEMIA: ICD-10-CM

## 2023-03-13 DIAGNOSIS — M06.4 UNDIFFERENTIATED INFLAMMATORY ARTHRITIS (HCC): ICD-10-CM

## 2023-03-13 PROCEDURE — 99215 OFFICE O/P EST HI 40 MIN: CPT | Performed by: INTERNAL MEDICINE

## 2023-03-13 RX ORDER — FOLIC ACID/VIT B COMPLEX AND C 0.8 MG
TABLET ORAL
COMMUNITY
Start: 2023-03-11

## 2023-03-13 RX ORDER — METHOTREXATE 2.5 MG/1
15 TABLET ORAL
Qty: 72 TABLET | Refills: 0 | Status: SHIPPED | OUTPATIENT
Start: 2023-03-14

## 2023-03-13 RX ORDER — LANOLIN ALCOHOL/MO/W.PET/CERES
CREAM (GRAM) TOPICAL
COMMUNITY
Start: 2023-03-11

## 2023-03-13 RX ORDER — SENNOSIDES 8.6 MG
TABLET ORAL
COMMUNITY
Start: 2023-03-11

## 2023-03-13 NOTE — LETTER
3/13/2023    Patient: Heidy Shelby   YOB: 1975   Date of Visit: 3/13/2023     Nura Macias [address] In [place]    Dear Pascale Amador MD,    We recently saw Ms. Orly Berg in the [facility] for evaluation. My notes for this consultation are attached. If you have questions, please do not hesitate to call me. I look forward to following your patient along with you.     Sincerely,  Madhu Cisneros MD [facility]  Cell: [phone]

## 2023-03-13 NOTE — PROGRESS NOTES
Chief Complaint   Patient presents with    Joint Pain     1. Have you been to the ER, urgent care clinic since your last visit? Hospitalized since your last visit yes [facility] for dizziness    2. Have you seen or consulted any other health care providers outside of the [address] [place] [address] since your last visit? Include any pap smears or colon screening.  No

## 2023-03-13 NOTE — PATIENT INSTRUCTIONS
Continue methotrexate 6 pills once a week, folic acid 1mg daily, and Xeljanz daily. Repeat labs in 2 months (fasting blood work)  Keep trying to avoid fatty foods, and talk with your PCP about the timing of cholesterol-lowering medications. Keep hydrated and continue Senna, magnesium, and additional laxatives if needed to maintain roughly daily bowel movements. Return in 5 months.

## 2023-03-13 NOTE — PROGRESS NOTES
REASON FOR VISIT    This is an in-office Rheumatology follow-up visit for Ms. Medeiros for     ICD-10-CM   1. Seronegative rheumatoid arthritis of multiple sites ([facility].)  M06.09     Inflammatory arthritis phenotype includes:  Anti-CCP positive: no  Rheumatoid factor positive: no  HLA-B27 positive: no  Erosive disease: N/A  Extra-articular manifestations include: secondary fibromyalgia     Immunosuppression Screening (7/28/2021): Quantiferon TB: negative  PPD:  Not performed  Hepatitis B: negative  Hepatitis C: negative    Therapy History includes:  Current DMARD therapy include: methotrexate 20->15 mg every Tuesday (10/27/2020 to present), [employer] 11 mg XR daily (3/05/2021 to present)  Prior DMARD therapy include: methotrexate 15 mg every Tuesday (8/11/2020 to 10/27/2020)  Discontinued DMARDs because of inefficacy: None  Discontinued DMARDs because of side effects: None    HISTORY OF PRESENT ILLNESS  Ms. Hoang returns for a follow-up. TUNG 12/12/2022. Seen in the ER 1/27 for rectal pain after moving a large BM, rectal fissure noted, and advised to take OTC stool softeners. Started senna and magnesium supplements which has been helpful for constipation. More recently, hospitalized at [facility] from Thursday through Saturday for episodic dizziness particularly with positional changes. Still adjusting her doses of headache medications in the context of vertigo and blurred vision that was transiently worse after she bumped her head on a cabinet with bending down. Told heart rate was dropping too though while on a Holter monitor, now following with [address] [place] [address] Consultants ([phone]). Says she started feeling better after a couple doses of IV fluids, and was advised to hydrate better going forward, prescribed some electrolyte drinks. Still taking 15mg methotrexate once a week, Xeljanz daily. Joints overall have been doing well.  Increased alkaline phosphatase had resolved with February labs with the reduction in methotrexate to 15mg weekly. No return of morning stiffness. No significant CMC tenderness currently. Has not been needing spica splints. Left CMC throbs occasionally and remains tender with direct manipulation, but overall not bothersome. Knees pop on occasion but are not painful or swollen recently. Hasn't discussed use of lipid-lowering medication with her PCP yet, last LDL in November was still above goal of 100. REVIEW OF SYSTEMS    A comprehensive review of systems was performed and pertinent results are documented in the HPI, review of systems is otherwise non-contributory. PAST MEDICAL HISTORY    She has a past medical history of Chronic tension-type headache, not intractable (8/22/2022), DDD (degenerative disc disease), lumbosacral, Migraine, Osteoarthritis, Renal stones, Sciatica, and Tubular adenoma of colon (6/23/2020). FAMILY HISTORY    Her family history includes Arthritis-rheumatoid in her father; Gout in her brother and mother; No Known Problems in her sister, son, and another family member. SOCIAL HISTORY    She reports that she quit smoking about 8 years ago. Her smoking use included cigarettes. She smoked an average of .5 packs per day. She has never used smokeless tobacco. She reports that she does not currently use alcohol after a past usage of about 3.3 standard drinks per week. She reports that she does not use drugs. MEDICATIONS    Current Outpatient Medications   Medication Sig    hydrocortisone (Anusol-HC) 2.5 % rectal cream Insert  into rectum four (4) times daily. methotrexate (RHEUMATREX) 2.5 mg tablet Take 6 Tablets by mouth every Tuesday. tofacitinib 11 mg Tb24 Take 11 mg by mouth daily. Indications: rheumatoid arthritis    topiramate (TOPAMAX) 100 mg tablet Take 1 Tablet by mouth nightly. folic acid (FOLVITE) 1 mg tablet Take 1 Tablet by mouth daily.     SUMAtriptan (IMITREX) 100 mg tablet TAKE ONE TABLET BY MOUTH AT ONSET OF HEADACHE FOR ONE DOSE; MAY REPEAT ONE TABLET IN 2 HOURS IF NEEDED. *MAX DAILY AMOUNT IS 2 TABLETS    methocarbamoL (ROBAXIN) 500 mg tablet Take 1 Tablet by mouth four (4) times daily as needed for PRN Reason (Other) (head and neck pressure). (Patient not taking: Reported on 1/27/2023)    ergocalciferol (ERGOCALCIFEROL) 1,250 mcg (50,000 unit) capsule Take 1 Capsule by mouth every seven (7) days. No current facility-administered medications for this visit. ALLERGIES    Allergies   Allergen Reactions    Sulfa (Sulfonamide Antibiotics) Rash       PHYSICAL EXAMINATION    Visit Vitals  /60 (BP 1 Location: Left upper arm, BP Patient Position: Sitting, BP Cuff Size: Large adult)   Pulse 77   Temp 98.7 °F (37.1 °C)   Resp 16   Wt 224 lb 3.2 oz (101.7 kg)   SpO2 97%   BMI 38.48 kg/m²       General:  The patient is well developed, well nourished, alert, and in no apparent distress. Eyes: Sclera are anicteric. No conjunctival injection. HEENT:  Oropharynx is clear. No oral ulcers. Adequate salivary pooling. No cervical or supraclavicular lymphadenopathy. Lungs:  Clear to auscultation bilaterally, without wheeze or stridor. Normal respiratory effort. Cor:  Regular rate and rhythm. No murmur rub or gallop. Abdomen: Soft, non-tender, without hepatomegaly or masses. Extremities: No calf tenderness. Warm and well perfused. Skin: Post surgical L CMC scar. Still no petechial, purpuric, or psoriaform rashes. Neuro: Nonfocal  Musculoskeletal:  Stable crepitus in bilateral shoulders and knees. Still mild left CMC tenderness (overlying surgical scar), no synovitis in hands, knees, or feet.     DATA REVIEW    Laboratory   2/20/23: WBC 5.9, Hgb 10.8, Hct 34.5, Plt 219; ESR 11, CO2 18, Tbili 0.3, AST 27, ALT 18, AlkP 108, CRP 9mg/L  11/21/22: Cholesterol 222, , ESR 37, Cr 0.79, Alk phos 123, AST 25, ALT 12, Tbili 0.4, CBC WNL, CRP 13/mg/L  9/8/22: NT proBNP 135, Troponin-High sensitivity 4, Cr 0.84, LFT WNL, WBC 4.4, HGB 10.2, Plt 238  5/2/22: cholesterol 221, , ESR 10, Cr 0.83, LFT WNL, WBC 5.3, HGB 10.6, Plt 270, CRP 12 mg/L  1/10/22: Cr 0.73, LFT WNL, WBC 6.3, HGB 10.2, Plt 253,   Recent laboratory results were reviewed, summarized, and discussed with the patient. 10/29/21: CRP 2.19, ESR 36    Imaging    Musculoskeletal Ultrasound    None    Radiographs    CHEST (9/8/22) : No acute cardiopulmonary process    Bilateral Knee 6/16/2013: overall alignment is normal. The joint spaces are preserved and only minimal arthritic changes are present, with tiny osteophytes present at the margins of the femorotibial compartments bilaterally. There is no evidence of joint effusion, erosions, or other features to suggest inflammatory arthritis. Bilateral hand radiograph showed Alignment and bone mineralization is normal bilaterally. No significant arthritic changes are present. CT Imaging    CT ABD PELV W CONT (4/12/22): 1. No acute abdominal or pelvic pathology. 2. Punctate bilateral nonobstructing renal stones    CT Abdomen and PElvis without contrast 9/28/2020: LOWER THORAX: No significant abnormality in the incidentally imaged lower chest. LIVER: No mass. BILIARY TREE: Gallbladder is within normal limits. CBD is not dilated. SPLEEN: within normal limits. PANCREAS: No focal abnormality. ADRENALS: Unremarkable. KIDNEYS/URETERS: Small nonobstructing bilateral renal stones. STOMACH: Unremarkable. SMALL BOWEL: No dilatation or wall thickening. COLON: No dilatation or wall thickening. APPENDIX: Unremarkable. PERITONEUM: No ascites or pneumoperitoneum. RETROPERITONEUM: Multiple prominent lymph nodes in the upper abdomen including gastrohepatic ligament, periportal space, and periaortic marylu chains without significant change. REPRODUCTIVE ORGANS: Unremarkable URINARY BLADDER: No mass or calculus. BONES: No destructive bone lesion. ABDOMINAL WALL: No mass or hernia.     MR Imaging    MRI Lumbar Spine without contrast 5/12/2014: Alignment: There are 5 nonrib-bearing lumbar type vertebra. There is a normal lumbar lordosis without listhesis. Intervertebral discs: There is L2 and L5 intervertebral disc desiccation with mild height loss at L2 and moderate height loss at the L5 level with mild bulging at L2 and more moderate bulging, anterior extrusion, and dorsal disc protrusion of the L5 disc level. Vertebral bodies/ marrow: There is endplate Modic type II signal demonstrated at the T12, L2 and L5 levels. A small Schmorl's node is seen at the anterior, superior L1 vertebral level. There is no marrow signal to indicate a marrow replacement process or fracture. Conus and cauda equina: The conus terminates at the T12 level and demonstrates normal signal characteristics. The nerve roots of the cauda equina are normal in appearance. Paravertebral soft tissues: There is no paraspinal mass or fluid collection identified. By level: L1-L2: No canal or neural foraminal stenosis. L2-L3: Mild concentric disc bulge canal or neural foraminal stenosis. L3-L4: There is mild facet osteoarthritis without canal or neural foraminal stenosis. L4-L5: There is a left foraminal disc protrusion without canal or neural foraminal stenosis. Mild facet osteoarthritis is also noted. L5-S1: There is a broad-based left paracentral disc protrusion which is not associated with canal or neural foraminal stenosis. MRI Lumbar Spine without contrast 6/01/2011: Lumbar spine alignment is within normal limits. Chronic Schmorl's node formation in the superior endplates of L1 and L3 and in the inferior  endplate of L5. No compression deformity. Mild multilevel disc desiccation. Conus medullaris terminates at T12-L1. Signal and caliber of the distal cord and conus are within normal limits. Paraspinal soft tissues are within normal limits. T12-L1: facet arthrosis. No herniation or stenosis. L1-L2: Facet arthrosis. No herniation or stenosis.  L2-L3: Diffuse disc bulge, facet arthrosis, and thickened ligamentum flavum. Mild central spinal canal stenosis. L3-L4: Diffuse disc bulge, facet arthrosis, and thickened ligamentum flavum. No stenosis. L4-L5: Diffuse disc bulge, facet arthrosis, and thickened ligamentum flavum. No stenosis. L5-S1: Diffuse disc bulge, facet arthrosis, and thickened ligamentum flavum. Mild left foraminal stenosis. DXA     None    PROCEDURE     Left Knee Kenalog 40 mg IA. (10/29/2021)    ASSESSMENT AND PLAN  47yo with seronegative rheumatoid arthritis and secondary osteoarthritis under good control on methotrexate and [employer]. Recent problems with presumably vagally mediated orthostasis on background of chronic constipation, migraines, and dehydration. Doing well now overall on current regimen, no changes to immunosuppression. 1. Seronegative rheumatoid arthritis of multiple sites (HCC)  - Cont methotrexate 15mg PO weekly, Xeljanz daily  - C REACTIVE PROTEIN, QT; Future  - CBC WITH AUTOMATED DIFF; Future  - METABOLIC PANEL, COMPREHENSIVE; Future  - SED RATE (ESR); Future  - LIPID PANEL; Future    2. Long-term use of immunosuppressant medication  - Cont folic acid 1mg daily  - CBC WITH AUTOMATED DIFF; Future  - METABOLIC PANEL, COMPREHENSIVE; Future    3. Mixed hyperlipidemia  - LIPID PANEL; Future        Patient Instructions   Continue methotrexate 6 pills once a week, folic acid 1mg daily, and Xeljanz daily. Repeat labs in 2 months (fasting blood work)  Keep trying to avoid fatty foods, and talk with your PCP about the timing of cholesterol-lowering medications. Keep hydrated and continue Senna, magnesium, and additional laxatives if needed to maintain roughly daily bowel movements. Return in 5 months.       TODAY'S ORDERS    Orders Placed This Encounter    C REACTIVE PROTEIN, QT    CBC WITH AUTOMATED DIFF    METABOLIC PANEL, COMPREHENSIVE    SED RATE (ESR)    LIPID PANEL    melatonin 3 mg tablet    Nephro-Meseret 0.8 mg tab tablet    magnesium oxide (MAG-OX) 400 mg tablet    Senna 8.6 mg tablet       No future appointments.     Face to face time: 25 minutes  Note preparation and records review day of service: 17 minutes  Total provider time day of service: 42 minutes    Alden Gill MD    Adult Rheumatology   [address], [address] [place] [address] Road   Phone [phone]  Fax [phone]

## 2023-04-17 RX ORDER — SENNOSIDES 8.6 MG
1 TABLET ORAL
Qty: 90 TABLET | Refills: 1 | Status: SHIPPED | OUTPATIENT
Start: 2023-04-17

## 2023-04-17 RX ORDER — FOLIC ACID/VIT B COMPLEX AND C 0.8 MG
1 TABLET ORAL
Qty: 90 TABLET | Refills: 1 | Status: SHIPPED | OUTPATIENT
Start: 2023-04-17

## 2023-04-23 DIAGNOSIS — M06.09 SERONEGATIVE RHEUMATOID ARTHRITIS OF MULTIPLE SITES (HCC): Primary | ICD-10-CM

## 2023-04-24 DIAGNOSIS — Z79.60 LONG-TERM USE OF IMMUNOSUPPRESSANT MEDICATION: ICD-10-CM

## 2023-04-24 DIAGNOSIS — M06.09 SERONEGATIVE RHEUMATOID ARTHRITIS OF MULTIPLE SITES (HCC): Primary | ICD-10-CM

## 2023-04-24 DIAGNOSIS — E78.2 MIXED HYPERLIPIDEMIA: ICD-10-CM

## 2023-04-28 ENCOUNTER — TELEPHONE (OUTPATIENT)
Dept: FAMILY MEDICINE CLINIC | Age: 48
End: 2023-04-28

## 2023-04-28 DIAGNOSIS — Z79.60 LONG-TERM USE OF IMMUNOSUPPRESSANT MEDICATION: ICD-10-CM

## 2023-04-28 DIAGNOSIS — M06.09 SERONEGATIVE RHEUMATOID ARTHRITIS OF MULTIPLE SITES (HCC): Primary | ICD-10-CM

## 2023-04-28 NOTE — TELEPHONE ENCOUNTER
Called and spoke with Michael Clements. H has been advised we did not order pt's home PT. He will check home health order and contact that provider.

## 2023-04-28 NOTE — TELEPHONE ENCOUNTER
She needs to be discharged form home PT, she is starting [facility] for PT for shoulder.      Carol Ann Fatima

## 2023-05-15 LAB
BASOPHILS # BLD AUTO: 0 X10E3/UL (ref 0–0.2)
BASOPHILS NFR BLD AUTO: 0 %
EOSINOPHIL # BLD AUTO: 0.1 X10E3/UL (ref 0–0.4)
EOSINOPHIL NFR BLD AUTO: 1 %
ERYTHROCYTE [DISTWIDTH] IN BLOOD BY AUTOMATED COUNT: 17.1 % (ref 11.7–15.4)
HCT VFR BLD AUTO: 33.3 % (ref 34–46.6)
HGB BLD-MCNC: 10.4 G/DL (ref 11.1–15.9)
IMM GRANULOCYTES # BLD AUTO: 0 X10E3/UL (ref 0–0.1)
IMM GRANULOCYTES NFR BLD AUTO: 0 %
LYMPHOCYTES # BLD AUTO: 0.9 X10E3/UL (ref 0.7–3.1)
LYMPHOCYTES NFR BLD AUTO: 14 %
MCH RBC QN AUTO: 28.2 PG (ref 26.6–33)
MCHC RBC AUTO-ENTMCNC: 31.2 G/DL (ref 31.5–35.7)
MCV RBC AUTO: 90 FL (ref 79–97)
MONOCYTES # BLD AUTO: 0.3 X10E3/UL (ref 0.1–0.9)
MONOCYTES NFR BLD AUTO: 5 %
NEUTROPHILS # BLD AUTO: 5.4 X10E3/UL (ref 1.4–7)
NEUTROPHILS NFR BLD AUTO: 80 %
PLATELET # BLD AUTO: 204 X10E3/UL (ref 150–450)
RBC # BLD AUTO: 3.69 X10E6/UL (ref 3.77–5.28)
WBC # BLD AUTO: 6.7 X10E3/UL (ref 3.4–10.8)

## 2023-05-16 LAB
ALBUMIN SERPL-MCNC: 4.1 G/DL (ref 3.8–4.8)
ALBUMIN/GLOB SERPL: 1.4 {RATIO} (ref 1.2–2.2)
ALP SERPL-CCNC: 111 IU/L (ref 44–121)
ALT SERPL-CCNC: 19 IU/L (ref 0–32)
AST SERPL-CCNC: 34 IU/L (ref 0–40)
BILIRUB SERPL-MCNC: 0.3 MG/DL (ref 0–1.2)
BUN SERPL-MCNC: 12 MG/DL (ref 6–24)
BUN/CREAT SERPL: 15 (ref 9–23)
CALCIUM SERPL-MCNC: 9.2 MG/DL (ref 8.7–10.2)
CHLORIDE SERPL-SCNC: 106 MMOL/L (ref 96–106)
CHOLEST SERPL-MCNC: 190 MG/DL (ref 100–199)
CO2 SERPL-SCNC: 21 MMOL/L (ref 20–29)
CREAT SERPL-MCNC: 0.78 MG/DL (ref 0.57–1)
CRP SERPL-MCNC: 22 MG/L (ref 0–10)
EGFRCR SERPLBLD CKD-EPI 2021: 94 ML/MIN/1.73
ERYTHROCYTE [SEDIMENTATION RATE] IN BLOOD BY WESTERGREN METHOD: 22 MM/HR (ref 0–32)
GLOBULIN SER CALC-MCNC: 2.9 G/DL (ref 1.5–4.5)
GLUCOSE SERPL-MCNC: 82 MG/DL (ref 70–99)
HDLC SERPL-MCNC: 65 MG/DL
LDLC SERPL CALC-MCNC: 105 MG/DL (ref 0–99)
POTASSIUM SERPL-SCNC: 4.6 MMOL/L (ref 3.5–5.2)
PROT SERPL-MCNC: 7 G/DL (ref 6–8.5)
SODIUM SERPL-SCNC: 140 MMOL/L (ref 134–144)
TRIGL SERPL-MCNC: 114 MG/DL (ref 0–149)
VLDLC SERPL CALC-MCNC: 20 MG/DL (ref 5–40)

## 2023-06-01 NOTE — TELEPHONE ENCOUNTER
Last office visit 03/13/23  Next office visit 06/13/23    Lab Results   Component Value Date    WBC 6.7 05/15/2023    HGB 10.4 (L) 05/15/2023    HCT 33.3 (L) 05/15/2023    MCV 90 05/15/2023     05/15/2023     Lab Results   Component Value Date     05/15/2023    K 4.6 05/15/2023     05/15/2023    CO2 21 05/15/2023    BUN 12 05/15/2023    CREATININE 0.78 05/15/2023    GLUCOSE 82 05/15/2023    CALCIUM 9.2 05/15/2023    PROT 7.0 05/15/2023    LABALBU 4.1 05/15/2023    BILITOT 0.3 05/15/2023    ALKPHOS 111 05/15/2023    AST 34 05/15/2023    ALT 19 05/15/2023    LABGLOM 94 05/15/2023    GFRAA >60 09/08/2022    AGRATIO 1.4 05/15/2023    GLOB 3.6 09/08/2022

## 2023-06-02 RX ORDER — TOFACITINIB 11 MG/1
TABLET, FILM COATED, EXTENDED RELEASE ORAL
Qty: 90 TABLET | Refills: 1 | Status: ACTIVE | OUTPATIENT
Start: 2023-06-02

## 2023-06-13 ENCOUNTER — OFFICE VISIT (OUTPATIENT)
Age: 48
End: 2023-06-13
Payer: COMMERCIAL

## 2023-06-13 VITALS
RESPIRATION RATE: 16 BRPM | SYSTOLIC BLOOD PRESSURE: 116 MMHG | BODY MASS INDEX: 39.48 KG/M2 | WEIGHT: 230 LBS | TEMPERATURE: 98.3 F | HEART RATE: 63 BPM | DIASTOLIC BLOOD PRESSURE: 75 MMHG | OXYGEN SATURATION: 96 %

## 2023-06-13 DIAGNOSIS — E55.9 VITAMIN D DEFICIENCY, UNSPECIFIED: ICD-10-CM

## 2023-06-13 DIAGNOSIS — M67.813 BICEPS TENDONOSIS OF RIGHT SHOULDER: ICD-10-CM

## 2023-06-13 DIAGNOSIS — Z79.899 OTHER LONG TERM (CURRENT) DRUG THERAPY: ICD-10-CM

## 2023-06-13 DIAGNOSIS — M06.09 RHEUMATOID ARTHRITIS WITHOUT RHEUMATOID FACTOR, MULTIPLE SITES (HCC): Primary | ICD-10-CM

## 2023-06-13 PROCEDURE — 99214 OFFICE O/P EST MOD 30 MIN: CPT | Performed by: INTERNAL MEDICINE

## 2023-06-13 RX ORDER — FOLIC ACID/VIT B COMPLEX AND C 0.8 MG
TABLET ORAL
COMMUNITY
Start: 2023-04-18

## 2023-06-13 RX ORDER — FOLIC ACID 1 MG/1
1 TABLET ORAL DAILY
Qty: 90 TABLET | Refills: 3 | Status: SHIPPED | OUTPATIENT
Start: 2023-06-13

## 2023-06-13 RX ORDER — ERGOCALCIFEROL 1.25 MG/1
50000 CAPSULE ORAL
Qty: 12 CAPSULE | Refills: 1 | Status: SHIPPED | OUTPATIENT
Start: 2023-06-13

## 2023-06-13 ASSESSMENT — PATIENT HEALTH QUESTIONNAIRE - PHQ9
1. LITTLE INTEREST OR PLEASURE IN DOING THINGS: 0
SUM OF ALL RESPONSES TO PHQ QUESTIONS 1-9: 0
SUM OF ALL RESPONSES TO PHQ QUESTIONS 1-9: 0
2. FEELING DOWN, DEPRESSED OR HOPELESS: 0
SUM OF ALL RESPONSES TO PHQ QUESTIONS 1-9: 0
SUM OF ALL RESPONSES TO PHQ9 QUESTIONS 1 & 2: 0
SUM OF ALL RESPONSES TO PHQ QUESTIONS 1-9: 0

## 2023-07-13 SDOH — ECONOMIC STABILITY: TRANSPORTATION INSECURITY
IN THE PAST 12 MONTHS, HAS LACK OF TRANSPORTATION KEPT YOU FROM MEETINGS, WORK, OR FROM GETTING THINGS NEEDED FOR DAILY LIVING?: NO

## 2023-07-13 SDOH — ECONOMIC STABILITY: HOUSING INSECURITY
IN THE LAST 12 MONTHS, WAS THERE A TIME WHEN YOU DID NOT HAVE A STEADY PLACE TO SLEEP OR SLEPT IN A SHELTER (INCLUDING NOW)?: NO

## 2023-07-13 SDOH — ECONOMIC STABILITY: FOOD INSECURITY: WITHIN THE PAST 12 MONTHS, THE FOOD YOU BOUGHT JUST DIDN'T LAST AND YOU DIDN'T HAVE MONEY TO GET MORE.: OFTEN TRUE

## 2023-07-13 SDOH — ECONOMIC STABILITY: INCOME INSECURITY: HOW HARD IS IT FOR YOU TO PAY FOR THE VERY BASICS LIKE FOOD, HOUSING, MEDICAL CARE, AND HEATING?: SOMEWHAT HARD

## 2023-07-13 SDOH — ECONOMIC STABILITY: FOOD INSECURITY: WITHIN THE PAST 12 MONTHS, YOU WORRIED THAT YOUR FOOD WOULD RUN OUT BEFORE YOU GOT MONEY TO BUY MORE.: OFTEN TRUE

## 2023-07-14 ENCOUNTER — TELEMEDICINE (OUTPATIENT)
Facility: CLINIC | Age: 48
End: 2023-07-14
Payer: COMMERCIAL

## 2023-07-14 DIAGNOSIS — G43.109 MIGRAINE WITH AURA AND WITHOUT STATUS MIGRAINOSUS, NOT INTRACTABLE: Primary | ICD-10-CM

## 2023-07-14 PROCEDURE — 99213 OFFICE O/P EST LOW 20 MIN: CPT | Performed by: FAMILY MEDICINE

## 2023-07-14 RX ORDER — SUMATRIPTAN 100 MG/1
TABLET, FILM COATED ORAL
Qty: 9 TABLET | Refills: 1 | Status: SHIPPED | OUTPATIENT
Start: 2023-07-14

## 2023-07-14 RX ORDER — TOPIRAMATE 100 MG/1
100 TABLET, FILM COATED ORAL NIGHTLY
Qty: 90 TABLET | Refills: 4 | Status: SHIPPED | OUTPATIENT
Start: 2023-07-14

## 2023-07-14 ASSESSMENT — ENCOUNTER SYMPTOMS
PHOTOPHOBIA: 0
NAUSEA: 0

## 2023-07-14 NOTE — PROGRESS NOTES
Re Zavala (:  1975) is a Established patient, presenting virtually for evaluation of the following:    Assessment & Plan   Below is the assessment and plan developed based on review of pertinent history, physical exam, labs, studies, and medications. 1. Migraine with aura and without status migrainosus, not intractable  -     SUMAtriptan (IMITREX) 100 MG tablet; TAKE ONE TABLET BY MOUTH AT ONSET OF HEADACHE FOR ONE DOSE; MAY REPEAT ONE TABLET IN 2 HOURS IF NEEDED. *MAX DAILY AMOUNT IS 2 TABLETS, Disp-9 tablet, R-1Normal  -     topiramate (TOPAMAX) 100 MG tablet; Take 1 tablet by mouth nightly, Disp-90 tablet, R-4Normal    Migraines doing well  Continue current plans. Refills per orders    Return in about 6 months (around 2024) for migraines. Subjective   Patient presents with:  Migraine    Today is the second one she has had since April, so she is better. Her Imitrex is working. Also, she was diagnosed with uterine polyps. She is scheduled to have them removed and have an endometrial biopsy at the end of the month. Review of Systems   HENT:          No photophobia. Eyes:  Negative for photophobia. Gastrointestinal:  Negative for nausea. Neurological:  Positive for headaches. Negative for facial asymmetry, speech difficulty, weakness and numbness. Objective   Patient-Reported Vitals  No data recorded     Physical Exam  Constitutional:       General: She is not in acute distress. Appearance: Normal appearance. Neurological:      Mental Status: She is alert and oriented to person, place, and time. Re Zavala, was evaluated through a synchronous (real-time) audio-video encounter. The patient (or guardian if applicable) is aware that this is a billable service, which includes applicable co-pays. This Virtual Visit was conducted with patient's (and/or legal guardian's) consent.  Patient identification was verified, and a caregiver was present when

## 2023-07-24 ENCOUNTER — TELEPHONE (OUTPATIENT)
Facility: CLINIC | Age: 48
End: 2023-07-24

## 2023-07-24 NOTE — TELEPHONE ENCOUNTER
----- Message from Pedrito Monreal sent at 7/21/2023 11:33 AM EDT -----  Subject: Medication Problem    Medication: SUMAtriptan (IMITREX) 100 MG tablet  Dosage: 100 MG  Ordering Provider: Chinyere Lechuga     Question/Problem: The patient called on 7/21/2023. She stated she needs   this 100 MG taken out of her medications.  She is now taking 22 MG       Pharmacy: [facility] [id number] - [id number] [facility] [phone] [place] [phone]    ---------------------------------------------------------------------------  --------------  Bridget WEAVER  [phone]; OK to leave message on voicemail  ---------------------------------------------------------------------------  --------------    SCRIPT ANSWERS  Relationship to Patient: Self

## 2023-07-28 ENCOUNTER — TELEPHONE (OUTPATIENT)
Facility: CLINIC | Age: 48
End: 2023-07-28

## 2023-07-28 NOTE — TELEPHONE ENCOUNTER
Zohra from home health care is calling about orders that Dr Susann Boeck had signed for the plan of care. They now have discharged her and Dr Susann Boeck would not sign off on it stating she didn't order the home care.   Plan of care signed 04/12/23    Zohra needs to verify if she will sign if discharge will be signed   [phone]  Ext [phone]

## 2023-07-28 NOTE — TELEPHONE ENCOUNTER
I did not order this and am not sure about what the medical problem. The ordering physician should sign off on this.

## 2023-09-18 ENCOUNTER — TRANSCRIBE ORDERS (OUTPATIENT)
Facility: HOSPITAL | Age: 48
End: 2023-09-18

## 2023-09-18 DIAGNOSIS — M06.09 RHEUMATOID ARTHRITIS WITHOUT RHEUMATOID FACTOR, MULTIPLE SITES (HCC): ICD-10-CM

## 2023-09-18 DIAGNOSIS — Z79.899 OTHER LONG TERM (CURRENT) DRUG THERAPY: ICD-10-CM

## 2023-09-18 DIAGNOSIS — Z12.31 VISIT FOR SCREENING MAMMOGRAM: Primary | ICD-10-CM

## 2023-09-28 DIAGNOSIS — Z79.899 OTHER LONG TERM (CURRENT) DRUG THERAPY: ICD-10-CM

## 2023-09-28 DIAGNOSIS — M06.09 RHEUMATOID ARTHRITIS WITHOUT RHEUMATOID FACTOR, MULTIPLE SITES (HCC): ICD-10-CM

## 2023-10-02 DIAGNOSIS — Z79.899 OTHER LONG TERM (CURRENT) DRUG THERAPY: ICD-10-CM

## 2023-10-02 DIAGNOSIS — M06.09 RHEUMATOID ARTHRITIS WITHOUT RHEUMATOID FACTOR, MULTIPLE SITES (HCC): ICD-10-CM

## 2023-10-02 LAB
ALBUMIN SERPL-MCNC: 3.8 G/DL (ref 3.5–5)
ALBUMIN/GLOB SERPL: 1.1 (ref 1.1–2.2)
ALP SERPL-CCNC: 96 U/L (ref 45–117)
ALT SERPL-CCNC: 27 U/L (ref 12–78)
ANION GAP SERPL CALC-SCNC: 2 MMOL/L (ref 5–15)
AST SERPL-CCNC: 34 U/L (ref 15–37)
BASOPHILS # BLD: 0 K/UL (ref 0–0.1)
BASOPHILS NFR BLD: 0 % (ref 0–1)
BILIRUB SERPL-MCNC: 0.7 MG/DL (ref 0.2–1)
BUN SERPL-MCNC: 11 MG/DL (ref 6–20)
BUN/CREAT SERPL: 14 (ref 12–20)
CALCIUM SERPL-MCNC: 9.1 MG/DL (ref 8.5–10.1)
CHLORIDE SERPL-SCNC: 113 MMOL/L (ref 97–108)
CO2 SERPL-SCNC: 25 MMOL/L (ref 21–32)
CREAT SERPL-MCNC: 0.81 MG/DL (ref 0.55–1.02)
DIFFERENTIAL METHOD BLD: ABNORMAL
EOSINOPHIL # BLD: 0 K/UL (ref 0–0.4)
EOSINOPHIL NFR BLD: 1 % (ref 0–7)
ERYTHROCYTE [DISTWIDTH] IN BLOOD BY AUTOMATED COUNT: 17.7 % (ref 11.5–14.5)
GLOBULIN SER CALC-MCNC: 3.4 G/DL (ref 2–4)
GLUCOSE SERPL-MCNC: 96 MG/DL (ref 65–100)
HCT VFR BLD AUTO: 35.2 % (ref 35–47)
HGB BLD-MCNC: 10.7 G/DL (ref 11.5–16)
IMM GRANULOCYTES # BLD AUTO: 0 K/UL (ref 0–0.04)
IMM GRANULOCYTES NFR BLD AUTO: 0 % (ref 0–0.5)
LYMPHOCYTES # BLD: 1 K/UL (ref 0.8–3.5)
LYMPHOCYTES NFR BLD: 16 % (ref 12–49)
MCH RBC QN AUTO: 28.7 PG (ref 26–34)
MCHC RBC AUTO-ENTMCNC: 30.4 G/DL (ref 30–36.5)
MCV RBC AUTO: 94.4 FL (ref 80–99)
MONOCYTES # BLD: 0.3 K/UL (ref 0–1)
MONOCYTES NFR BLD: 5 % (ref 5–13)
NEUTS SEG # BLD: 4.6 K/UL (ref 1.8–8)
NEUTS SEG NFR BLD: 78 % (ref 32–75)
NRBC # BLD: 0 K/UL (ref 0–0.01)
NRBC BLD-RTO: 0 PER 100 WBC
PLATELET # BLD AUTO: 230 K/UL (ref 150–400)
PMV BLD AUTO: 11.3 FL (ref 8.9–12.9)
POTASSIUM SERPL-SCNC: 4.5 MMOL/L (ref 3.5–5.1)
PROT SERPL-MCNC: 7.2 G/DL (ref 6.4–8.2)
RBC # BLD AUTO: 3.73 M/UL (ref 3.8–5.2)
SODIUM SERPL-SCNC: 140 MMOL/L (ref 136–145)
WBC # BLD AUTO: 6 K/UL (ref 3.6–11)

## 2023-10-09 NOTE — TELEPHONE ENCOUNTER
Richar Delgadillo MD     Adult Rheumatology   [facility], [address] [place]   Phone [phone]  Fax [phone]

## 2023-10-17 ENCOUNTER — TRANSCRIBE ORDERS (OUTPATIENT)
Facility: HOSPITAL | Age: 48
End: 2023-10-17

## 2023-10-17 ENCOUNTER — HOSPITAL ENCOUNTER (OUTPATIENT)
Facility: HOSPITAL | Age: 48
Discharge: HOME | End: 2023-10-20
Attending: STUDENT IN AN ORGANIZED HEALTH CARE EDUCATION/TRAINING PROGRAM
Payer: MEDICAID

## 2023-10-17 VITALS — HEIGHT: 64 IN | WEIGHT: 230 LBS | BODY MASS INDEX: 39.27 KG/M2

## 2023-10-17 DIAGNOSIS — Z12.31 VISIT FOR SCREENING MAMMOGRAM: Primary | ICD-10-CM

## 2023-10-17 DIAGNOSIS — Z12.31 VISIT FOR SCREENING MAMMOGRAM: ICD-10-CM

## 2023-10-17 PROCEDURE — 77067 SCR MAMMO BI INCL CAD: CPT

## 2023-10-17 RX ORDER — SENNOSIDES 8.6 MG/1
1 TABLET, COATED ORAL
Qty: 90 TABLET | Refills: 3 | Status: SHIPPED | OUTPATIENT
Start: 2023-10-17

## 2023-10-17 RX ORDER — FOLIC ACID/VIT B COMPLEX AND C 0.8 MG
TABLET ORAL
Qty: 90 TABLET | Refills: 3 | Status: SHIPPED | OUTPATIENT
Start: 2023-10-17

## 2024-04-15 RX ORDER — LANOLIN ALCOHOL/MO/W.PET/CERES
400 CREAM (GRAM) TOPICAL DAILY
Qty: 30 TABLET | OUTPATIENT
Start: 2024-04-15

## 2024-04-15 RX ORDER — TOPIRAMATE 50 MG/1
50 TABLET, FILM COATED ORAL DAILY
Qty: 30 TABLET | OUTPATIENT
Start: 2024-04-15

## 2024-04-16 RX ORDER — LANOLIN ALCOHOL/MO/W.PET/CERES
400 CREAM (GRAM) TOPICAL DAILY
Qty: 30 TABLET | OUTPATIENT
Start: 2024-04-16

## 2024-04-16 RX ORDER — TOPIRAMATE 50 MG/1
50 TABLET, FILM COATED ORAL DAILY
Qty: 30 TABLET | OUTPATIENT
Start: 2024-04-16

## 2024-10-18 ENCOUNTER — TRANSCRIBE ORDERS (OUTPATIENT)
Facility: HOSPITAL | Age: 49
End: 2024-10-18

## 2024-10-18 ENCOUNTER — HOSPITAL ENCOUNTER (OUTPATIENT)
Facility: HOSPITAL | Age: 49
Discharge: HOME | End: 2024-10-21
Payer: COMMERCIAL

## 2024-10-18 VITALS — BODY MASS INDEX: 39.27 KG/M2 | HEIGHT: 64 IN | WEIGHT: 230 LBS

## 2024-10-18 DIAGNOSIS — Z12.31 VISIT FOR SCREENING MAMMOGRAM: Primary | ICD-10-CM

## 2024-10-18 DIAGNOSIS — Z12.31 VISIT FOR SCREENING MAMMOGRAM: ICD-10-CM

## 2024-10-18 PROCEDURE — 77067 SCR MAMMO BI INCL CAD: CPT

## 2024-11-07 ENCOUNTER — HOSPITAL ENCOUNTER (OUTPATIENT)
Facility: HOSPITAL | Age: 49
Discharge: HOME | End: 2024-11-10
Attending: STUDENT IN AN ORGANIZED HEALTH CARE EDUCATION/TRAINING PROGRAM
Payer: COMMERCIAL

## 2024-11-07 VITALS — BODY MASS INDEX: 39.27 KG/M2 | HEIGHT: 64 IN | WEIGHT: 230 LBS

## 2024-11-07 DIAGNOSIS — R92.8 ABNORMAL MAMMOGRAM: ICD-10-CM

## 2024-11-07 PROCEDURE — G0279 TOMOSYNTHESIS, MAMMO: HCPCS

## 2024-11-20 NOTE — PROGRESS NOTES
DATE OF PROCEDURE: 11/20/2024.     PRIMARY SURGEON: Marlon Cruz M.D.     CO-SURGEON: DO JOSEPH Steve     FIRST ASSISTANT: Malcolm Granados MD, PGY-4 Orthopedics     PREOPERATIVE DIAGNOSIS:     1. 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 Cervical pseudoarthrosis with hardware failure  2. History of C3-T1 PCDF     POSTOPERATIVE DIAGNOSIS: Same      PROCEDURES PERFORMED:  1.  Revision posterior cervical spinal fusion C2-T1  2.  Posterior segmental instrumentation C3-T1  3.  Cadaveric bone grafting.  4. Brainlab assistance for placement of the pedicle screws (requiring greater than 30 minutes of preoperative planning time)  5.  Hardware removal C3  6.  Bilateral paraspinal advancement flap 9 cm x 4 cm      ANESTHESIA: General endotracheal anesthesia.     ESTIMATED BLOOD LOSS:  25 mL.     IMPLANTS: Depuy  C2: 3.5x16mm lateral mass screws bilaterally  C3: 4.0x14mm LM screws  3.5mm Ti rods bilaterally  Extra-small Infuse  15cc of cancellous chips     SPECIMENS: None.     FINDINGS:  loose C3 screws     DRAINS: One deep and one superficial     COMPLICATIONS: None.     SPONGE AND NEEDLE COUNT: Correct x2.     NEUROLOGICAL MONITORING: Motor evoked potentials, somatosensory evoked potential, and free-running EMG.  There were no changes to stable and reliable bilateral upper and lower extremity motor and somatosensory potentials throughout the entire procedure.     DESCRIPTION INFORMED CONSENT: I had a sit down discussion with the patient regarding the planned procedure. We specifically discussed the risks, benefits, and alternatives to surgery. We discussed the surgical procedure including the skin incision, nerve decompression, bone fusion, allograft and/or iliac crest bone graft, and surgical implants including lateral mass screws and pedicle screws as indicated: they understand the risks include but are not limited to death, paralysis, blindness, bleeding, infection, damage to arteries, veins and nerves, vertebral artery injury, dysphagia,  HISTORY OF PRESENT ILLNESS  Christiano Smith is a 49 y.o. female. Patient presents with:  Headache: Follow up- in past 2 months pt has had 10 headaches, 1 in past 1 week     She has mixed headaches. Unfortunately, she left her journal at home. She has been a little better this week, having only one mild headache. PT has helped. Review of Systems   HENT:          Phonophobia   Eyes:  Positive for photophobia. She sees a few dark spots from time to time. Gastrointestinal:  Positive for nausea and vomiting. She has only vomited twice. Neurological:  Positive for tingling and sensory change. Negative for speech change and focal weakness. Tingling in the fingertips. Visit Vitals  /77   Pulse (!) 59   Temp 97.6 °F (36.4 °C) (Temporal)   Resp 20   Ht 5' 4\" (1.626 m)   Wt 232 lb (105.2 kg)   SpO2 100%   BMI 39.82 kg/m²     Physical Exam  Constitutional:       General: She is not in acute distress. Appearance: Normal appearance. Neurological:      Mental Status: She is alert and oriented to person, place, and time. Gait: Gait abnormal.      Comments: Facies symmetric, walks with a cane     ASSESSMENT and PLAN    ICD-10-CM ICD-9-CM    1. Migraine with aura and without status migrainosus, not intractable  G43.109 346.00 REFERRAL TO NEUROLOGY      SUMAtriptan (IMITREX) 100 mg tablet      2. Chronic tension-type headache, not intractable  G44.229 339.12 REFERRAL TO NEUROLOGY          Unchanged to slightly better  Neurology referral  Continue current plans. Refills per orders    Follow-up and Dispositions    Return if symptoms worsen or fail to improve. Reviewed plan of care. Patient has provided input and agrees with goals. spinal fluid leak, continued or worsening pain, no improvement in symptoms, non-union, and the possible need for more surgery in the future, as well as the possibility other unforseen and unknown complications. We talked about expected hospital stay and recovery period. All questions were answered; they understand and wish to proceed.     REASON FOR OPERATION AND BRIEF HISTORY AND PHYSICAL: The patient is a 62 y.o. 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 She is here for surgery today.     DESCRIPTION OF PROCEDURE: The patient was met in the preoperative area where his posterior cervical spine was marked as the operative site. Subsequently, they were brought to the Operating Room where they was placed under general endotracheal anesthesia. Sequential compression devices were placed in the patient's bilateral calves and run throughout the case. Cameron head-barboza was then placed in the standard sterile fashion and the patient was positioned prone on a slider bed with gel pads. The arms were padded talked. The shoulders were gently taped down. The posterior cervical spine was then prepped and draped in a normal sterile fashion.     A full timeout was then called, identifying the patient, the procedural site and levels, the availability of all instruments and implants, and no specific nursing, surgical, anesthetic, or neurological monitoring concerns. Finally, it was safe to proceed with surgery and the patient was given weight-appropriate dose of Ancef by the Anesthesia Service.     We then extended the previous midline posterior cervical incision and performed a preliminary subperiosteal exposure to C2-T1. Previous hardware from C3-T1 was visualized. The set screws and rods were removed. The bilateral C3 screws were grossly loose, so they were removed. We inserted longer and larger diameter screws at C3.     The neuro navigation array was docked onto the Cameron  head-barboza. Surface matching was achieved via the [employer] interface to the preop CT scan. Using neuro-navigation, bilateral C2 pars screws were inserted. Fluoroscopy confirmed good hardware position. Motors following were consistent with baseline.     Rods were then measured, cut, contoured and locked into place with provided set plugs and torque wrench. Infuse sponges were laid on top of bony surfaces. It was combined with 15cc of allograft and was laid dorsally along the decorticated surfaces from C2-T1 bilaterally. 1g of vancomycin powder was spread in the wound. One 10-Somali hemovac drain was placed deep and was left to gravity.     Bilateral paraspinal muscles were mobilized based on lateral perforators and advanced to obliterate the dead space at midline. The deep fascia was then created 9 cm in length by 4 cm in width in order to create a tension free water-tight closure and to assist with wound healing. At this point the wound was closed in layers using 0 Ethibond for the fascia. One superficial 10-Somali HV drain was placed to suction. Would was closed in layers with 2-0 Vicryl for the dermis, 3-0 Monocryl for the skin, and dermabond with staples.     Cervical collar was placed. The patient was then flipped supine. The Og[place] head-barboza was removed. She was extubated and transferred to the recovery room in stable condition.     JUSTIFICATION OF MODIFIER -22 AND CO-SURGEON: This was a complex revision posterior cervical case in a patient with hardware failure and pseudoarthrosis. Two attending surgeons were indicated to reduce operative times, blood loss, accurately place hardware, and to improve patient outcomes.

## 2025-05-20 ENCOUNTER — OFFICE VISIT (OUTPATIENT)
Facility: CLINIC | Age: 50
End: 2025-05-20
Payer: COMMERCIAL

## 2025-05-20 VITALS
DIASTOLIC BLOOD PRESSURE: 62 MMHG | OXYGEN SATURATION: 99 % | HEIGHT: 64 IN | SYSTOLIC BLOOD PRESSURE: 93 MMHG | BODY MASS INDEX: 39.95 KG/M2 | RESPIRATION RATE: 16 BRPM | WEIGHT: 234 LBS | TEMPERATURE: 98 F | HEART RATE: 77 BPM

## 2025-05-20 DIAGNOSIS — M79.7 SECONDARY FIBROMYALGIA: ICD-10-CM

## 2025-05-20 DIAGNOSIS — M06.09 SERONEGATIVE RHEUMATOID ARTHRITIS OF MULTIPLE SITES (HCC): ICD-10-CM

## 2025-05-20 DIAGNOSIS — G43.109 MIGRAINE WITH AURA AND WITHOUT STATUS MIGRAINOSUS, NOT INTRACTABLE: Primary | ICD-10-CM

## 2025-05-20 DIAGNOSIS — L91.8 ACROCHORDON: ICD-10-CM

## 2025-05-20 PROCEDURE — 99214 OFFICE O/P EST MOD 30 MIN: CPT | Performed by: NURSE PRACTITIONER

## 2025-05-20 RX ORDER — AMOXICILLIN 500 MG/1
CAPSULE ORAL
COMMUNITY
Start: 2025-05-15

## 2025-05-20 RX ORDER — SUMATRIPTAN SUCCINATE 25 MG/1
TABLET ORAL
Qty: 10 TABLET | Refills: 2 | Status: SHIPPED | OUTPATIENT
Start: 2025-05-20

## 2025-05-20 RX ORDER — OMEPRAZOLE 20 MG/1
CAPSULE, DELAYED RELEASE ORAL
COMMUNITY
Start: 2025-04-09

## 2025-05-20 RX ORDER — TOPIRAMATE 50 MG/1
50 TABLET, FILM COATED ORAL DAILY
Qty: 90 TABLET | Refills: 1 | Status: SHIPPED | OUTPATIENT
Start: 2025-05-20

## 2025-05-20 SDOH — ECONOMIC STABILITY: FOOD INSECURITY: WITHIN THE PAST 12 MONTHS, YOU WORRIED THAT YOUR FOOD WOULD RUN OUT BEFORE YOU GOT MONEY TO BUY MORE.: NEVER TRUE

## 2025-05-20 SDOH — ECONOMIC STABILITY: FOOD INSECURITY: WITHIN THE PAST 12 MONTHS, THE FOOD YOU BOUGHT JUST DIDN'T LAST AND YOU DIDN'T HAVE MONEY TO GET MORE.: NEVER TRUE

## 2025-05-20 ASSESSMENT — PATIENT HEALTH QUESTIONNAIRE - PHQ9
SUM OF ALL RESPONSES TO PHQ QUESTIONS 1-9: 0
SUM OF ALL RESPONSES TO PHQ QUESTIONS 1-9: 0
1. LITTLE INTEREST OR PLEASURE IN DOING THINGS: NOT AT ALL
SUM OF ALL RESPONSES TO PHQ QUESTIONS 1-9: 0
SUM OF ALL RESPONSES TO PHQ QUESTIONS 1-9: 0
2. FEELING DOWN, DEPRESSED OR HOPELESS: NOT AT ALL

## 2025-05-20 ASSESSMENT — ENCOUNTER SYMPTOMS
SHORTNESS OF BREATH: 0
CHEST TIGHTNESS: 0

## 2025-05-20 NOTE — ASSESSMENT & PLAN NOTE
Rx topiramate 50 mg daily  Rx sumatriptan 25 mg as needed  Follow-up 6 months for medication refills  
oral

## 2025-05-20 NOTE — PROGRESS NOTES
Chief Complaint   Patient presents with    New Patient     Patient stated she would like to get back on her migraine medication and need a rheumatologist and dermatologist referral.    Establish Care      Have you been to the ER, urgent care clinic since your last visit?  Hospitalized since your last visit?   NO    Have you seen or consulted any other health care providers outside our system since your last visit?   NO     “Have you had a pap smear?”    YES - October  No cervical cancer screening on file

## 2025-05-20 NOTE — PROGRESS NOTES
History of present illness:Jaycee Huizar is a 49 y.o. female presenting to establish care, referral and medication refill    Mrs. Huizar reports history of migraine headaches.  Previously taking Topamax 50 mg daily and sumatriptan 25 mg as needed which managed her headaches.    She has a history of Rheumatoid arthritis and fibromyalgia. Needs referral to Rheumatologist.     Noticed changes/growth of moles on her leg and above her ear.     Review of Systems   Constitutional:  Positive for fatigue. Negative for fever.   Respiratory:  Negative for chest tightness and shortness of breath.    Cardiovascular:  Negative for chest pain and palpitations.   Musculoskeletal:  Positive for arthralgias and gait problem.   Neurological:  Positive for headaches. Negative for dizziness.           Past Medical History:   Diagnosis Date    Chronic tension-type headache, not intractable 2022    DDD (degenerative disc disease), lumbosacral     Migraine     Osteoarthritis     Renal stones     Sciatica     Tubular adenoma of colon 2020     Past Surgical History:   Procedure Laterality Date     SECTION  ,     COLONOSCOPY N/A 2020    COLONOSCOPY performed by Duke Jim[facility]nez MD at [facility] ENDOSCOPY    DENTAL SURGERY      HAND SURGERY Left     DIMITRI STEREO BREAST BX W LOC DEVICE ADDL LESION RIGHT Right     Benign Stereo Bx 2015    SHOULDER SURGERY Right     TONSILLECTOMY  1979    TUBAL LIGATION  2000    WISDOM TOOTH EXTRACTION  1992     Family History   Problem Relation Age of Onset    No Known Problems Son     No Known Problems Other     Gout Brother     No Known Problems Sister     Gout Mother     Rheum Arthritis Father        Prior to Admission medications    Medication Sig Start Date End Date Taking? Authorizing Provider   amoxicillin (AMOXIL) 500 MG capsule  5/15/25  Yes ProviderNikki MD   omeprazole (PRILOSEC) 20 MG delayed release capsule  25  Yes Provider, MD Nikki   topiramate

## 2025-06-05 ENCOUNTER — OFFICE VISIT (OUTPATIENT)
Facility: CLINIC | Age: 50
End: 2025-06-05
Payer: COMMERCIAL

## 2025-06-05 VITALS
RESPIRATION RATE: 16 BRPM | WEIGHT: 234 LBS | TEMPERATURE: 98.3 F | HEART RATE: 65 BPM | SYSTOLIC BLOOD PRESSURE: 92 MMHG | DIASTOLIC BLOOD PRESSURE: 54 MMHG | BODY MASS INDEX: 39.95 KG/M2 | HEIGHT: 64 IN | OXYGEN SATURATION: 100 %

## 2025-06-05 DIAGNOSIS — Z00.00 ENCOUNTER FOR WELL ADULT EXAM WITHOUT ABNORMAL FINDINGS: Primary | ICD-10-CM

## 2025-06-05 DIAGNOSIS — Z11.4 SCREENING FOR HIV WITHOUT PRESENCE OF RISK FACTORS: ICD-10-CM

## 2025-06-05 DIAGNOSIS — Z00.00 ENCOUNTER FOR WELL ADULT EXAM WITHOUT ABNORMAL FINDINGS: ICD-10-CM

## 2025-06-05 PROCEDURE — 99396 PREV VISIT EST AGE 40-64: CPT | Performed by: NURSE PRACTITIONER

## 2025-06-05 ASSESSMENT — ENCOUNTER SYMPTOMS
NAUSEA: 0
SHORTNESS OF BREATH: 0
VOMITING: 0
DIARRHEA: 0
COUGH: 0
ABDOMINAL DISTENTION: 0
CONSTIPATION: 0
BLOOD IN STOOL: 0
ABDOMINAL PAIN: 0

## 2025-06-05 NOTE — PROGRESS NOTES
Chief Complaint   Patient presents with    Annual Exam    Blood Work      Have you been to the ER, urgent care clinic since your last visit?  Hospitalized since your last visit?   NO    Have you seen or consulted any other health care providers outside our system since your last visit?   NO     “Have you had a pap smear?”    YES - Next appt Nov    No cervical cancer screening on file

## 2025-06-05 NOTE — PROGRESS NOTES
Jaycee Huizar  49 y.o. female  1975  MRN:[id number]  [facility] MEDICINE  Progress Note         History of Present Illness  Jaycee Huizar is a 49-year-old female who presents for a physical exam.    She underwent a Pap smear in October 2024 and has a follow-up with GYN scheduled for November 2025.    IMMUNIZATIONS  She reports having received the shingles vaccine, pneumonia vaccine, and influenza vaccine.    Health maintenance:    The following sections were reviewed & updated as appropriate: PMH, PSH, FH, and SH.    Patient Active Problem List   Diagnosis    Tubular adenoma of colon    Seronegative rheumatoid arthritis of multiple sites (HCC)    Secondary fibromyalgia    Patellofemoral arthralgia of both knees    Long-term use of immunosuppressant medication    DDD (degenerative disc disease), lumbosacral    Renal stones    Migraine    Obesity, morbid (HCC)    Osteoarthritis    Sciatica    Vitamin D deficiency    Chronic tension-type headache, not intractable          Prior to Admission medications    Medication Sig Start Date End Date Taking? Authorizing Provider   omeprazole (PRILOSEC) 20 MG delayed release capsule  4/9/25  Yes Provider, MD Nikki   topiramate (TOPAMAX) 50 MG tablet Take 1 tablet by mouth daily 5/20/25  Yes Yudy Garcia APRN - CNP   SUMAtriptan (IMITREX) 25 MG tablet TAKE ONE TABLET BY MOUTH AT ONSET OF HEADACHE FOR ONE DOSE; MAY REPEAT ONE TABLET IN 2 HOURS IF NEEDED.*MAX DAILY AMOUNT IS 2 TABLETS 5/20/25  Yes Yudy Garcia APRN - CNP          Allergies   Allergen Reactions    Sulfa Antibiotics Rash         Smoker:   Social History     Tobacco Use   Smoking Status Former    Current packs/day: 0.00    Types: Cigarettes    Quit date: 11/27/2014    Years since quitting: 10.5   Smokeless Tobacco Never     ETOH:   Social History     Substance and Sexual Activity   Alcohol Use Not Currently    Alcohol/week: 3.3 standard drinks of alcohol       FH:    Family

## 2025-06-06 LAB
ALBUMIN SERPL-MCNC: 3.5 G/DL (ref 3.5–5)
ALBUMIN/GLOB SERPL: 0.9 (ref 1.1–2.2)
ALP SERPL-CCNC: 126 U/L (ref 45–117)
ALT SERPL-CCNC: 15 U/L (ref 12–78)
ANION GAP SERPL CALC-SCNC: 6 MMOL/L (ref 2–12)
AST SERPL-CCNC: 24 U/L (ref 15–37)
BASOPHILS # BLD: 0.02 K/UL (ref 0–0.1)
BASOPHILS NFR BLD: 0.5 % (ref 0–1)
BILIRUB SERPL-MCNC: 0.4 MG/DL (ref 0.2–1)
BUN SERPL-MCNC: 11 MG/DL (ref 6–20)
BUN/CREAT SERPL: 13 (ref 12–20)
CALCIUM SERPL-MCNC: 9.2 MG/DL (ref 8.5–10.1)
CHLORIDE SERPL-SCNC: 110 MMOL/L (ref 97–108)
CHOLEST SERPL-MCNC: 184 MG/DL
CO2 SERPL-SCNC: 24 MMOL/L (ref 21–32)
CREAT SERPL-MCNC: 0.86 MG/DL (ref 0.55–1.02)
DIFFERENTIAL METHOD BLD: ABNORMAL
EOSINOPHIL # BLD: 0.09 K/UL (ref 0–0.4)
EOSINOPHIL NFR BLD: 2.1 % (ref 0–7)
ERYTHROCYTE [DISTWIDTH] IN BLOOD BY AUTOMATED COUNT: 15.8 % (ref 11.5–14.5)
EST. AVERAGE GLUCOSE BLD GHB EST-MCNC: 91 MG/DL
GLOBULIN SER CALC-MCNC: 3.9 G/DL (ref 2–4)
GLUCOSE SERPL-MCNC: 93 MG/DL (ref 65–100)
HBA1C MFR BLD: 4.8 % (ref 4–5.6)
HCT VFR BLD AUTO: 33.7 % (ref 35–47)
HDLC SERPL-MCNC: 39 MG/DL
HDLC SERPL: 4.7 (ref 0–5)
HGB BLD-MCNC: 9.3 G/DL (ref 11.5–16)
HIV 1+2 AB+HIV1 P24 AG SERPL QL IA: NONREACTIVE
HIV 1/2 RESULT COMMENT: NORMAL
IMM GRANULOCYTES # BLD AUTO: 0.01 K/UL (ref 0–0.04)
IMM GRANULOCYTES NFR BLD AUTO: 0.2 % (ref 0–0.5)
LDLC SERPL CALC-MCNC: 122.6 MG/DL (ref 0–100)
LYMPHOCYTES # BLD: 1.19 K/UL (ref 0.8–3.5)
LYMPHOCYTES NFR BLD: 27.6 % (ref 12–49)
MCH RBC QN AUTO: 22.1 PG (ref 26–34)
MCHC RBC AUTO-ENTMCNC: 27.6 G/DL (ref 30–36.5)
MCV RBC AUTO: 80.2 FL (ref 80–99)
MONOCYTES # BLD: 0.21 K/UL (ref 0–1)
MONOCYTES NFR BLD: 4.9 % (ref 5–13)
NEUTS SEG # BLD: 2.78 K/UL (ref 1.8–8)
NEUTS SEG NFR BLD: 64.7 % (ref 32–75)
NRBC # BLD: 0 K/UL (ref 0–0.01)
NRBC BLD-RTO: 0 PER 100 WBC
PLATELET # BLD AUTO: 234 K/UL (ref 150–400)
PMV BLD AUTO: 11.5 FL (ref 8.9–12.9)
POTASSIUM SERPL-SCNC: 3.9 MMOL/L (ref 3.5–5.1)
PROT SERPL-MCNC: 7.4 G/DL (ref 6.4–8.2)
RBC # BLD AUTO: 4.2 M/UL (ref 3.8–5.2)
RBC MORPH BLD: ABNORMAL
RBC MORPH BLD: ABNORMAL
SODIUM SERPL-SCNC: 140 MMOL/L (ref 136–145)
TRIGL SERPL-MCNC: 112 MG/DL
TSH SERPL DL<=0.05 MIU/L-ACNC: 2.72 UIU/ML (ref 0.36–3.74)
VLDLC SERPL CALC-MCNC: 22.4 MG/DL
WBC # BLD AUTO: 4.3 K/UL (ref 3.6–11)

## 2025-06-12 ENCOUNTER — TELEPHONE (OUTPATIENT)
Facility: CLINIC | Age: 50
End: 2025-06-12

## 2025-06-12 DIAGNOSIS — D50.8 OTHER IRON DEFICIENCY ANEMIA: Primary | ICD-10-CM

## 2025-06-12 RX ORDER — FERROUS SULFATE 325(65) MG
325 TABLET ORAL DAILY
Qty: 90 TABLET | Refills: 0 | Status: SHIPPED | OUTPATIENT
Start: 2025-06-12

## 2025-06-12 NOTE — TELEPHONE ENCOUNTER
Called patient advised of recent results per provider. Patient understood. Advised to  Schedule a follow-up appointment in 1 month to recheck labs.

## 2025-07-17 ENCOUNTER — OFFICE VISIT (OUTPATIENT)
Facility: CLINIC | Age: 50
End: 2025-07-17
Payer: COMMERCIAL

## 2025-07-17 VITALS
HEIGHT: 64 IN | TEMPERATURE: 98 F | RESPIRATION RATE: 16 BRPM | HEART RATE: 67 BPM | SYSTOLIC BLOOD PRESSURE: 91 MMHG | DIASTOLIC BLOOD PRESSURE: 62 MMHG | WEIGHT: 232 LBS | BODY MASS INDEX: 39.61 KG/M2 | OXYGEN SATURATION: 98 %

## 2025-07-17 DIAGNOSIS — D50.8 OTHER IRON DEFICIENCY ANEMIA: Primary | ICD-10-CM

## 2025-07-17 DIAGNOSIS — G43.009 MIGRAINE WITHOUT AURA AND WITHOUT STATUS MIGRAINOSUS, NOT INTRACTABLE: ICD-10-CM

## 2025-07-17 DIAGNOSIS — M06.09 SERONEGATIVE RHEUMATOID ARTHRITIS OF MULTIPLE SITES (HCC): ICD-10-CM

## 2025-07-17 DIAGNOSIS — E78.2 MIXED HYPERLIPIDEMIA: ICD-10-CM

## 2025-07-17 DIAGNOSIS — Z02.89 ENCOUNTER FOR COMPLETION OF FORM WITH PATIENT: ICD-10-CM

## 2025-07-17 PROBLEM — D64.9 ABSOLUTE ANEMIA: Status: ACTIVE | Noted: 2025-07-17

## 2025-07-17 LAB
BASOPHILS # BLD: 0.04 K/UL (ref 0–0.1)
BASOPHILS NFR BLD: 0.7 % (ref 0–1)
DIFFERENTIAL METHOD BLD: ABNORMAL
EOSINOPHIL # BLD: 0.08 K/UL (ref 0–0.4)
EOSINOPHIL NFR BLD: 1.5 % (ref 0–7)
ERYTHROCYTE [DISTWIDTH] IN BLOOD BY AUTOMATED COUNT: 21.9 % (ref 11.5–14.5)
HCT VFR BLD AUTO: 39.1 % (ref 35–47)
HGB BLD-MCNC: 11.5 G/DL (ref 11.5–16)
IMM GRANULOCYTES # BLD AUTO: 0.03 K/UL (ref 0–0.04)
IMM GRANULOCYTES NFR BLD AUTO: 0.6 % (ref 0–0.5)
LYMPHOCYTES # BLD: 1.26 K/UL (ref 0.8–3.5)
LYMPHOCYTES NFR BLD: 23.4 % (ref 12–49)
MCH RBC QN AUTO: 25.3 PG (ref 26–34)
MCHC RBC AUTO-ENTMCNC: 29.4 G/DL (ref 30–36.5)
MCV RBC AUTO: 86.1 FL (ref 80–99)
MONOCYTES # BLD: 0.25 K/UL (ref 0–1)
MONOCYTES NFR BLD: 4.6 % (ref 5–13)
NEUTS SEG # BLD: 3.74 K/UL (ref 1.8–8)
NEUTS SEG NFR BLD: 69.2 % (ref 32–75)
NRBC # BLD: 0 K/UL (ref 0–0.01)
NRBC BLD-RTO: 0 PER 100 WBC
PLATELET # BLD AUTO: 241 K/UL (ref 150–400)
PMV BLD AUTO: 10.8 FL (ref 8.9–12.9)
RBC # BLD AUTO: 4.54 M/UL (ref 3.8–5.2)
RBC MORPH BLD: ABNORMAL
RBC MORPH BLD: ABNORMAL
WBC # BLD AUTO: 5.4 K/UL (ref 3.6–11)

## 2025-07-17 PROCEDURE — 99214 OFFICE O/P EST MOD 30 MIN: CPT | Performed by: NURSE PRACTITIONER

## 2025-07-17 ASSESSMENT — ENCOUNTER SYMPTOMS: SHORTNESS OF BREATH: 0

## 2025-07-17 NOTE — PROGRESS NOTES
Jaycee Huizar is a 49 y.o. female    Identified pt with two pt identifiers(name and ). Reviewed record in preparation for visit and have obtained necessary documentation. All patient medications has been reviewed.    Chief Complaint   Patient presents with    iron       Wt Readings from Last 3 Encounters:   25 105.2 kg (232 lb)   25 106.1 kg (234 lb)   25 106.1 kg (234 lb)     Temp Readings from Last 3 Encounters:   25 98.3 °F (36.8 °C) (Oral)   25 98 °F (36.7 °C) (Oral)     BP Readings from Last 3 Encounters:   25 (!) 92/54   25 93/62   23 116/75     Pulse Readings from Last 3 Encounters:   25 65   25 77   23 63       Have you been to the ER, urgent care clinic since your last visit?  Hospitalized since your last visit?   NO    Have you seen or consulted any other health care providers outside our system since your last visit?   NO     “Have you had a pap smear?”    NO- Scheduled for 2025    No cervical cancer screening on file

## 2025-07-17 NOTE — PROGRESS NOTES
Jaycee Huizar is a 49 y.o. female presenting for iron deficiency anemia, DMV form  History of Present Illness    Mrs. Huizar has been taking her iron supplements daily but dislikes the change in stool color to a darker shade. She reports no constipation or nausea.    She has been previously diagnosed with fibromyalgia and was referred to rheumatology but has not been able to schedule an appointment.     She has previously completed physical therapy for neck pain associated with migraine headaches, which provided some relief but did not completely alleviate her symptoms. She continues to experience neck tightness and tension headaches, which sometimes progressed to migraines.  She feels they are well-managed with Topamax and Imitrex.    Discussed elevated cholesterol and recent lab work, patient is making dietary changes.      Review of Systems   Constitutional:  Positive for fatigue.   Respiratory:  Negative for shortness of breath.    Cardiovascular:  Negative for chest pain and palpitations.   Musculoskeletal:  Positive for arthralgias.   Neurological:  Positive for headaches. Negative for dizziness.           Past Medical History:   Diagnosis Date    Chronic tension-type headache, not intractable 2022    DDD (degenerative disc disease), lumbosacral     Fibromyalgia     Migraine     Osteoarthritis     Renal stones     Sciatica     Tubular adenoma of colon 2020     Past Surgical History:   Procedure Laterality Date     SECTION  ,     COLONOSCOPY N/A 2020    COLONOSCOPY performed by Duke Jiménez MD at [facility] ENDOSCOPY    DENTAL SURGERY      HAND SURGERY Left     DIMITRI STEREO BREAST BX W LOC DEVICE ADDL LESION RIGHT Right     Benign Stereo Bx 2015    SHOULDER SURGERY Right     TONSILLECTOMY  1979    TUBAL LIGATION      WISDOM TOOTH EXTRACTION       Family History   Problem Relation Age of Onset    No Known Problems Son     No Known Problems Other     Gout Brother     No

## (undated) DEVICE — POLYP TRAP: Brand: TRAPEASE®

## (undated) DEVICE — CONTAINER SPEC 20 ML LID NEUT BUFF FORMALIN 10 % POLYPR STS

## (undated) DEVICE — 1200 GUARD II KIT W/5MM TUBE W/O VAC TUBE: Brand: GUARDIAN

## (undated) DEVICE — ADULT SPO2 SENSOR: Brand: NELLCOR

## (undated) DEVICE — BAG BELONG PT PERS CLEAR HANDL

## (undated) DEVICE — BITEBLOCK ENDOSCP 60FR MAXI WHT POLYETH STURDY W/ VELC WVN

## (undated) DEVICE — Device

## (undated) DEVICE — CANNULA CUSH AD W/ 14FT TBG

## (undated) DEVICE — SNARE ENDOSCP M L240CM W27MM SHTH DIA2.4MM CHN 2.8MM OVL

## (undated) DEVICE — BAG SPEC BIOHZRD 10 X 10 IN --

## (undated) DEVICE — CUFF BLD PRSS AD SZ 11 FOR 25-34CM 1 TB TRIPURPOSE CONN

## (undated) DEVICE — CATH IV AUTOGRD BC PNK 20GA 25 -- INSYTE

## (undated) DEVICE — SOLIDIFIER MEDC 1200ML -- CONVERT TO 356117

## (undated) DEVICE — ELECTRODE,RADIOTRANSLUCENT,FOAM,3PK: Brand: MEDLINE

## (undated) DEVICE — KIT COLON W/ 1.1OZ LUB AND 2 END